# Patient Record
Sex: MALE | Race: OTHER | Employment: FULL TIME | URBAN - NONMETROPOLITAN AREA
[De-identification: names, ages, dates, MRNs, and addresses within clinical notes are randomized per-mention and may not be internally consistent; named-entity substitution may affect disease eponyms.]

---

## 2017-04-15 ENCOUNTER — HOSPITAL ENCOUNTER (EMERGENCY)
Age: 42
Discharge: HOME | End: 2017-04-15
Attending: EMERGENCY MEDICINE

## 2017-04-15 ENCOUNTER — APPOINTMENT (OUTPATIENT)
Dept: CT IMAGING | Age: 42
End: 2017-04-15

## 2017-04-15 VITALS
OXYGEN SATURATION: 97 % | SYSTOLIC BLOOD PRESSURE: 135 MMHG | HEART RATE: 54 BPM | TEMPERATURE: 97.8 F | RESPIRATION RATE: 20 BRPM | DIASTOLIC BLOOD PRESSURE: 77 MMHG

## 2017-04-15 DIAGNOSIS — N20.1 URETEROLITHIASIS: Primary | ICD-10-CM

## 2017-04-15 LAB
ABSOLUTE BANDS #: 0.5 K/UL (ref 0–1)
ABSOLUTE EOS #: 0.25 K/UL (ref 0–0.4)
ABSOLUTE LYMPH #: 2.27 K/UL (ref 0.9–2.5)
ABSOLUTE MONO #: 1.51 K/UL (ref 0–1)
ANION GAP SERPL CALCULATED.3IONS-SCNC: 14 MMOL/L (ref 9–17)
ATYPICAL LYMPHOCYTE ABSOLUTE COUNT: 1.76 K/UL (ref 0–1)
ATYPICAL LYMPHOCYTES: 14 % (ref 0–10)
BANDS: 4 % (ref 0–10)
BASOPHILS # BLD: ABNORMAL % (ref 0–2)
BASOPHILS ABSOLUTE: ABNORMAL K/UL (ref 0–0.2)
BILIRUBIN URINE: NEGATIVE
BUN BLDV-MCNC: 17 MG/DL (ref 6–20)
BUN/CREAT BLD: 18 (ref 9–20)
CALCIUM SERPL-MCNC: 8.8 MG/DL (ref 8.6–10.4)
CHLORIDE BLD-SCNC: 102 MMOL/L (ref 98–107)
CO2: 25 MMOL/L (ref 20–31)
COLOR: YELLOW
COMMENT UA: ABNORMAL
CREAT SERPL-MCNC: 0.94 MG/DL (ref 0.7–1.2)
DIFFERENTIAL TYPE: ABNORMAL
EOSINOPHILS RELATIVE PERCENT: 2 % (ref 0–5)
GFR AFRICAN AMERICAN: >60 ML/MIN
GFR NON-AFRICAN AMERICAN: >60 ML/MIN
GFR SERPL CREATININE-BSD FRML MDRD: ABNORMAL ML/MIN/{1.73_M2}
GFR SERPL CREATININE-BSD FRML MDRD: ABNORMAL ML/MIN/{1.73_M2}
GLUCOSE BLD-MCNC: 149 MG/DL (ref 70–99)
GLUCOSE URINE: NEGATIVE
HCT VFR BLD CALC: 43 % (ref 41–53)
HEMOGLOBIN: 14.3 G/DL (ref 13.5–17.5)
KETONES, URINE: NEGATIVE
LEUKOCYTE ESTERASE, URINE: NEGATIVE
LYMPHOCYTES # BLD: 18 % (ref 13–44)
MCH RBC QN AUTO: 28.9 PG (ref 26–34)
MCHC RBC AUTO-ENTMCNC: 33.4 G/DL (ref 31–37)
MCV RBC AUTO: 86.6 FL (ref 80–100)
MONOCYTES # BLD: 12 % (ref 5–9)
MORPHOLOGY: ABNORMAL
NITRITE, URINE: NEGATIVE
PDW BLD-RTO: 14.6 % (ref 12.1–15.2)
PH UA: 6 (ref 5–8)
PLATELET # BLD: 210 K/UL (ref 140–450)
PLATELET ESTIMATE: ABNORMAL
PMV BLD AUTO: ABNORMAL FL (ref 6–12)
POTASSIUM SERPL-SCNC: 3.9 MMOL/L (ref 3.7–5.3)
PROTEIN UA: ABNORMAL
RBC # BLD: 4.96 M/UL (ref 4.5–5.9)
RBC # BLD: ABNORMAL 10*6/UL
SEG NEUTROPHILS: 50 % (ref 39–75)
SEGMENTED NEUTROPHILS ABSOLUTE COUNT: 6.31 K/UL (ref 2.1–6.5)
SODIUM BLD-SCNC: 141 MMOL/L (ref 135–144)
SPECIFIC GRAVITY UA: 1.02 (ref 1–1.03)
TURBIDITY: CLEAR
URINE HGB: NEGATIVE
UROBILINOGEN, URINE: NORMAL
WBC # BLD: 12.6 K/UL (ref 3.5–11)
WBC # BLD: ABNORMAL 10*3/UL

## 2017-04-15 PROCEDURE — 81003 URINALYSIS AUTO W/O SCOPE: CPT

## 2017-04-15 PROCEDURE — 6360000002 HC RX W HCPCS: Performed by: EMERGENCY MEDICINE

## 2017-04-15 PROCEDURE — 85025 COMPLETE CBC W/AUTO DIFF WBC: CPT

## 2017-04-15 PROCEDURE — 80048 BASIC METABOLIC PNL TOTAL CA: CPT

## 2017-04-15 PROCEDURE — 99284 EMERGENCY DEPT VISIT MOD MDM: CPT

## 2017-04-15 PROCEDURE — 96374 THER/PROPH/DIAG INJ IV PUSH: CPT

## 2017-04-15 PROCEDURE — 96375 TX/PRO/DX INJ NEW DRUG ADDON: CPT

## 2017-04-15 PROCEDURE — 74176 CT ABD & PELVIS W/O CONTRAST: CPT

## 2017-04-15 RX ORDER — ONDANSETRON 2 MG/ML
4 INJECTION INTRAMUSCULAR; INTRAVENOUS ONCE
Status: COMPLETED | OUTPATIENT
Start: 2017-04-15 | End: 2017-04-15

## 2017-04-15 RX ORDER — MORPHINE SULFATE 2 MG/ML
2 INJECTION, SOLUTION INTRAMUSCULAR; INTRAVENOUS ONCE
Status: DISCONTINUED | OUTPATIENT
Start: 2017-04-15 | End: 2017-04-15

## 2017-04-15 RX ORDER — KETOROLAC TROMETHAMINE 30 MG/ML
30 INJECTION, SOLUTION INTRAMUSCULAR; INTRAVENOUS ONCE
Status: COMPLETED | OUTPATIENT
Start: 2017-04-15 | End: 2017-04-15

## 2017-04-15 RX ORDER — OXYCODONE HYDROCHLORIDE AND ACETAMINOPHEN 5; 325 MG/1; MG/1
1 TABLET ORAL EVERY 6 HOURS PRN
Qty: 8 TABLET | Refills: 0 | Status: SHIPPED | OUTPATIENT
Start: 2017-04-15 | End: 2017-04-17

## 2017-04-15 RX ADMIN — HYDROMORPHONE HYDROCHLORIDE 1 MG: 1 INJECTION, SOLUTION INTRAMUSCULAR; INTRAVENOUS; SUBCUTANEOUS at 19:00

## 2017-04-15 RX ADMIN — KETOROLAC TROMETHAMINE 30 MG: 30 INJECTION, SOLUTION INTRAMUSCULAR at 17:57

## 2017-04-15 RX ADMIN — ONDANSETRON 4 MG: 2 INJECTION INTRAMUSCULAR; INTRAVENOUS at 19:00

## 2017-04-15 ASSESSMENT — PAIN SCALES - GENERAL
PAINLEVEL_OUTOF10: 9
PAINLEVEL_OUTOF10: 2
PAINLEVEL_OUTOF10: 7
PAINLEVEL_OUTOF10: 9

## 2017-04-15 ASSESSMENT — ENCOUNTER SYMPTOMS
HEMATOCHEZIA: 0
BELCHING: 0
FLATUS: 0
SHORTNESS OF BREATH: 0
CONSTIPATION: 1
RECTAL PAIN: 0
EYES NEGATIVE: 1
ABDOMINAL PAIN: 1
SORE THROAT: 0
NAUSEA: 0
ANAL BLEEDING: 0
ALLERGIC/IMMUNOLOGIC NEGATIVE: 1
ABDOMINAL DISTENTION: 0
HEMATEMESIS: 0
COUGH: 0
VOMITING: 0
DIARRHEA: 0
RESPIRATORY NEGATIVE: 1
BLOOD IN STOOL: 0

## 2017-04-15 ASSESSMENT — PAIN DESCRIPTION - LOCATION
LOCATION: ABDOMEN

## 2017-04-15 ASSESSMENT — PAIN - FUNCTIONAL ASSESSMENT: PAIN_FUNCTIONAL_ASSESSMENT: 0-10

## 2017-04-15 ASSESSMENT — PAIN DESCRIPTION - PAIN TYPE
TYPE: ACUTE PAIN

## 2017-04-15 ASSESSMENT — PAIN DESCRIPTION - ORIENTATION
ORIENTATION: LEFT
ORIENTATION: LEFT;LOWER

## 2017-04-15 ASSESSMENT — PAIN DESCRIPTION - PROGRESSION: CLINICAL_PROGRESSION: RAPIDLY IMPROVING

## 2019-08-04 ENCOUNTER — HOSPITAL ENCOUNTER (EMERGENCY)
Age: 44
Discharge: HOME | End: 2019-08-04
Attending: FAMILY MEDICINE
Payer: COMMERCIAL

## 2019-08-04 VITALS
WEIGHT: 160 LBS | DIASTOLIC BLOOD PRESSURE: 71 MMHG | TEMPERATURE: 98.3 F | RESPIRATION RATE: 18 BRPM | SYSTOLIC BLOOD PRESSURE: 122 MMHG | OXYGEN SATURATION: 95 % | HEART RATE: 75 BPM | BODY MASS INDEX: 26.63 KG/M2

## 2019-08-04 DIAGNOSIS — S39.012A STRAIN OF LUMBAR REGION, INITIAL ENCOUNTER: Primary | ICD-10-CM

## 2019-08-04 PROCEDURE — 99282 EMERGENCY DEPT VISIT SF MDM: CPT

## 2019-08-04 RX ORDER — CYCLOBENZAPRINE HCL 10 MG
10 TABLET ORAL 3 TIMES DAILY PRN
Qty: 21 TABLET | Refills: 0 | Status: SHIPPED | OUTPATIENT
Start: 2019-08-04 | End: 2019-08-14

## 2019-08-04 RX ORDER — PREDNISONE 20 MG/1
40 TABLET ORAL DAILY
Qty: 10 TABLET | Refills: 0 | Status: SHIPPED | OUTPATIENT
Start: 2019-08-04 | End: 2019-08-09

## 2019-08-04 ASSESSMENT — ENCOUNTER SYMPTOMS
SHORTNESS OF BREATH: 0
ABDOMINAL DISTENTION: 0
ABDOMINAL SWELLING: 0
RHINORRHEA: 0
BACK PAIN: 1
ABDOMINAL PAIN: 0
DIARRHEA: 0
COUGH: 0

## 2019-08-04 ASSESSMENT — PAIN SCALES - GENERAL: PAINLEVEL_OUTOF10: 8

## 2021-06-22 ENCOUNTER — HOSPITAL ENCOUNTER (EMERGENCY)
Age: 46
Discharge: HOME | End: 2021-06-22
Attending: INTERNAL MEDICINE

## 2021-06-22 VITALS
SYSTOLIC BLOOD PRESSURE: 124 MMHG | OXYGEN SATURATION: 96 % | RESPIRATION RATE: 18 BRPM | WEIGHT: 159 LBS | TEMPERATURE: 98.9 F | DIASTOLIC BLOOD PRESSURE: 74 MMHG | BODY MASS INDEX: 26.46 KG/M2 | HEART RATE: 78 BPM

## 2021-06-22 DIAGNOSIS — H11.32 NON-TRAUMATIC SUBCONJUNCTIVAL HEMORRHAGE, LEFT: ICD-10-CM

## 2021-06-22 DIAGNOSIS — S16.1XXA STRAIN OF NECK MUSCLE, INITIAL ENCOUNTER: Primary | ICD-10-CM

## 2021-06-22 PROCEDURE — 99283 EMERGENCY DEPT VISIT LOW MDM: CPT

## 2021-06-22 RX ORDER — IBUPROFEN 600 MG/1
600 TABLET ORAL 4 TIMES DAILY PRN
Qty: 40 TABLET | Refills: 0 | Status: SHIPPED | OUTPATIENT
Start: 2021-06-22 | End: 2022-09-29

## 2021-06-22 ASSESSMENT — PAIN DESCRIPTION - FREQUENCY: FREQUENCY: CONTINUOUS

## 2021-06-22 ASSESSMENT — PAIN DESCRIPTION - PROGRESSION: CLINICAL_PROGRESSION: NOT CHANGED

## 2021-06-22 ASSESSMENT — PAIN DESCRIPTION - DESCRIPTORS: DESCRIPTORS: ACHING;SHARP

## 2021-06-22 ASSESSMENT — PAIN SCALES - GENERAL: PAINLEVEL_OUTOF10: 8

## 2021-06-22 ASSESSMENT — PAIN DESCRIPTION - ORIENTATION: ORIENTATION: LEFT

## 2021-06-22 ASSESSMENT — PAIN DESCRIPTION - PAIN TYPE: TYPE: ACUTE PAIN

## 2021-06-22 ASSESSMENT — PAIN DESCRIPTION - LOCATION: LOCATION: NECK

## 2021-06-22 ASSESSMENT — PAIN DESCRIPTION - ONSET: ONSET: ON-GOING

## 2021-06-22 NOTE — ED PROVIDER NOTES
[facility] ED  EMERGENCY DEPARTMENT ENCOUNTER      Pt Name: Parul Palumbo  MRN: [id number]  [place] 1975  Date of evaluation: 6/22/2021  Provider: Nora Curran MD    [address] [place] [address]       Chief Complaint   Patient presents with    Neck Pain     right sided neck pain that started today. Anderson lifting or straining it. HISTORY OF PRESENT ILLNESS   (Location/Symptom, Timing/Onset, Context/Setting, Quality, Duration, Modifying Factors, Severity)  Note limiting factors. Parul Palumbo is a 51 y.o. male who has a history of diabetes, presents to the emergency department with his daughter for evaluation and management of left-sided-sided nontraumatic neck pain that started today. He denies any left arm weakness or focal neuro deficits. He is also complaining of left eye redness that developed today as well without any sensation of foreign body, visual loss, pain, drainage, pruritus. 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 He has not tried thing for his symptoms. He has not been seen by any other providers for it. This patient is being evaluated during the COVID-19 pandemic. HPI    Nursing Notes were reviewed. REVIEW OF SYSTEMS    (2-9 systems for level 4, 10 or more for level 5)       REVIEW OF SYSTEMS    Constitutional: Negative for fatigue and fever. HENT: negative for dental problem, ear pain, rhinorrhea and sore throat. Eyes: Positive for left eye redness, negative for pain and itching and drainage. Respiratory: Negative for cough, chest tightness and shortness of breath. Cardiovascular: Negative for chest pain, palpitations and leg swelling. Gastrointestinal: Negative for abdominal distention, abdominal pain, diarrhea, nausea and vomiting. Musculoskeletal: Positive for left neck pain, negative for arthralgias, back pain and neck pain.    Skin: Negative for wound, laceration, color change, pallor, rash Allergic/Immunologic: Negative for environmental allergies and food allergies. Neurological: Negative for head injury, loss of consciousness, dizziness, speech difficulty, weakness, distal tingling/numbness and headaches. Hematological: Negative for adenopathy. Does not bruise/bleed easily. Except as noted above the remainder of the review of systems was reviewed and negative. PASTMEDICAL HISTORY     Past Medical History:   Diagnosis Date    Diabetes [facility])          SURGICAL HISTORY     History reviewed. No pertinent surgical history. CURRENT MEDICATIONS       Previous Medications    No medications on file       ALLERGIES     Patient has no known allergies. FAMILY HISTORY     History reviewed. No pertinent family history. SOCIAL HISTORY       Social History     Socioeconomic History    Marital status:      Spouse name: None    Number of children: None    Years of education: None    Highest education level: None   Occupational History    None   Tobacco Use    Smoking status: Never Smoker    Smokeless tobacco: Never Used   Substance and Sexual Activity    Alcohol use: Never    Drug use: None    Sexual activity: None   Other Topics Concern    None   Social History Narrative    None     Social Determinants of Health     Financial Resource Strain:     Difficulty of Paying Living Expenses:    Food Insecurity:     Worried About Running Out of Food in the Last Year:     Ran Out of Food in the Last Year:    Transportation Needs:     Lack of Transportation (Medical):      Lack of Transportation (Non-Medical):    Physical Activity:     Days of Exercise per Week:     Minutes of Exercise per Session:    Stress:     Feeling of Stress :    Social Connections:     Frequency of Communication with Friends and Family:     Frequency of Social Gatherings with Friends and Family:     Attends Orthodox Services:     Active Member of Clubs or Organizations:     Attends Club or and breath sounds are symmetric and normal. No respiratory distress. There are no wheezes, rales or rhonchi. No tenderness is exhibited upon palpation of the chest wall. Abdominal: Soft. Bowel sounds are normal. No distension or no mass exhibitted. No organomegaly. There is no tenderness, rebound, rigidity or guarding. Genitourinary:   No CVA tenderness noted on examination. Musculoskeletal: Examination of the back demonstrates no focal areas of tenderness. No spinous tenderness or step-offs identified on palpation. No paraspinous tenderness. Normal range of motion of all extremities and torso. No edema, tenderness or deformity. .  Lymphadenopathy:  No cervical adenopathy. Neurological:   Alert and oriented to person, place, and time. Reflexes are normal.  There are no cranial nerve deficits. Normal muscle tone, motor and sensory function exhibitted. Strength 5/5 in all extremities and torso. Coordination and gait normal.   Skin: Skin is warm and dry. No rash noted. No diaphoresis. No erythema. No pallor. Psychiatric:  normal mood and affect. Behavior is  normal. Judgment and thought content normal.     DIAGNOSTIC RESULTS     EKG: All EKG's are interpreted by the Emergency Department Physician who either signs or Co-signs this chart in the absence of a cardiologist.    Not indicated. RADIOLOGY:   Non-plain film images such as CT, Ultrasoundand MRI are read by the radiologist. Plain radiographic images are visualized and preliminarily interpreted by the emergency physician with the below findings:    Not indicated. Interpretation per the Radiologist below, if available at the time of this note:    No orders to display         ED BEDSIDE ULTRASOUND:   Performed by ED Physician - none    LABS:  Labs Reviewed - No data to display    All other labs were within normal range or not returned as of this dictation.     EMERGENCY DEPARTMENT COURSE and DIFFERENTIAL DIAGNOSIS/MDM:   Vitals:    Vitals: worsen    [facility] ED  [address] [place] [address]  [phone]  Go to   As needed, If symptoms worsen    Tj Leal MD  [address] [place] [address] Dr Madai [facility]  [phone]    Schedule an appointment as soon as possible for a visit in 1 week  As needed, If symptoms worsen        Final Impression:   1. Strain of neck muscle, initial encounter    2. Non-traumatic subconjunctival hemorrhage, left               (Please note that portions of this note werecompleted with a voice recognition program.  Efforts were made to edit the dictations but occasionally words are mis-transcribed.)    FINAL IMPRESSION      1. Strain of neck muscle, initial encounter    2.  Non-traumatic subconjunctival hemorrhage, left          DISPOSITION/PLAN   DISPOSITION Decision To Discharge 06/22/2021 05:29:21 PM      PATIENT REFERRED TO:  [facility] ED  [address] [place] [address]  [phone]  Go to   As needed, If symptoms worsen    [facility] ED  [address] [place] [address]  [phone]  Go to   As needed, If symptoms worsen    Tj Leal MD  [address] [facility]  [phone]    Schedule an appointment as soon as possible for a visit in 1 week  As needed, If symptoms worsen      DISCHARGE MEDICATIONS:  New Prescriptions    IBUPROFEN (ADVIL;MOTRIN) 600 MG TABLET    Take 1 tablet by mouth 4 times daily as needed for Pain          (Please note that portions of this note were completed with a voice recognition program.  Efforts were made to edit the dictations but occasionally words are mis-transcribed.)    Francisco Valadez MD (electronically signed)  Attending Emergency Physician          Francisco Valadez MD  06/22/21 1065

## 2022-09-29 ENCOUNTER — HOSPITAL ENCOUNTER (EMERGENCY)
Age: 47
Discharge: HOME | End: 2022-09-29
Attending: EMERGENCY MEDICINE

## 2022-09-29 ENCOUNTER — APPOINTMENT (OUTPATIENT)
Dept: CT IMAGING | Age: 47
End: 2022-09-29

## 2022-09-29 VITALS
OXYGEN SATURATION: 98 % | WEIGHT: 160 LBS | DIASTOLIC BLOOD PRESSURE: 69 MMHG | TEMPERATURE: 98.8 F | HEIGHT: 65 IN | SYSTOLIC BLOOD PRESSURE: 115 MMHG | RESPIRATION RATE: 18 BRPM | HEART RATE: 64 BPM | BODY MASS INDEX: 26.66 KG/M2

## 2022-09-29 DIAGNOSIS — G44.1 OTHER VASCULAR HEADACHE: Primary | ICD-10-CM

## 2022-09-29 DIAGNOSIS — Q28.2 AVM (ARTERIOVENOUS MALFORMATION) BRAIN: ICD-10-CM

## 2022-09-29 LAB
ABSOLUTE EOS #: 0.1 K/UL (ref 0–0.4)
ABSOLUTE LYMPH #: 2.4 K/UL (ref 1–4.8)
ABSOLUTE MONO #: 0.7 K/UL (ref 0–1)
ANION GAP SERPL CALCULATED.3IONS-SCNC: 9 MMOL/L (ref 9–17)
BASOPHILS # BLD: 0 % (ref 0–2)
BASOPHILS ABSOLUTE: 0 K/UL (ref 0–0.2)
BUN BLDV-MCNC: 15 MG/DL (ref 6–20)
BUN/CREAT BLD: 15 (ref 9–20)
CALCIUM SERPL-MCNC: 9.1 MG/DL (ref 8.6–10.4)
CHLORIDE BLD-SCNC: 102 MMOL/L (ref 98–107)
CO2: 27 MMOL/L (ref 20–31)
CREAT SERPL-MCNC: 1.01 MG/DL (ref 0.7–1.2)
DIFFERENTIAL TYPE: YES
EOSINOPHILS RELATIVE PERCENT: 2 % (ref 0–5)
GFR AFRICAN AMERICAN: >60 ML/MIN
GFR NON-AFRICAN AMERICAN: >60 ML/MIN
GFR SERPL CREATININE-BSD FRML MDRD: ABNORMAL ML/MIN/{1.73_M2}
GLUCOSE BLD-MCNC: 129 MG/DL (ref 70–99)
HCT VFR BLD CALC: 40.6 % (ref 41–53)
HEMOGLOBIN: 14 G/DL (ref 13.5–17.5)
LYMPHOCYTES # BLD: 33 % (ref 13–44)
MCH RBC QN AUTO: 29.4 PG (ref 26–34)
MCHC RBC AUTO-ENTMCNC: 34.5 G/DL (ref 31–37)
MCV RBC AUTO: 85.2 FL (ref 80–100)
MONOCYTES # BLD: 10 % (ref 5–9)
PDW BLD-RTO: 14.1 % (ref 12.1–15.2)
PLATELET # BLD: 201 K/UL (ref 140–450)
POTASSIUM SERPL-SCNC: 3.9 MMOL/L (ref 3.7–5.3)
RBC # BLD: 4.77 M/UL (ref 4.5–5.9)
SEG NEUTROPHILS: 55 % (ref 39–75)
SEGMENTED NEUTROPHILS ABSOLUTE COUNT: 4 K/UL (ref 2.1–6.5)
SODIUM BLD-SCNC: 138 MMOL/L (ref 135–144)
WBC # BLD: 7.2 K/UL (ref 3.5–11)

## 2022-09-29 PROCEDURE — 99285 EMERGENCY DEPT VISIT HI MDM: CPT

## 2022-09-29 PROCEDURE — 70450 CT HEAD/BRAIN W/O DYE: CPT

## 2022-09-29 PROCEDURE — 36415 COLL VENOUS BLD VENIPUNCTURE: CPT

## 2022-09-29 PROCEDURE — 70496 CT ANGIOGRAPHY HEAD: CPT

## 2022-09-29 PROCEDURE — 80048 BASIC METABOLIC PNL TOTAL CA: CPT

## 2022-09-29 PROCEDURE — 85025 COMPLETE CBC W/AUTO DIFF WBC: CPT

## 2022-09-29 PROCEDURE — 6370000000 HC RX 637 (ALT 250 FOR IP): Performed by: EMERGENCY MEDICINE

## 2022-09-29 PROCEDURE — 6360000004 HC RX CONTRAST MEDICATION: Performed by: EMERGENCY MEDICINE

## 2022-09-29 RX ORDER — IBUPROFEN 200 MG
600 TABLET ORAL ONCE
Status: COMPLETED | OUTPATIENT
Start: 2022-09-29 | End: 2022-09-29

## 2022-09-29 RX ORDER — IBUPROFEN 600 MG/1
600 TABLET ORAL EVERY 6 HOURS PRN
Qty: 20 TABLET | Refills: 0 | Status: SHIPPED | OUTPATIENT
Start: 2022-09-29

## 2022-09-29 RX ADMIN — IOPAMIDOL 100 ML: 755 INJECTION, SOLUTION INTRAVENOUS at 18:38

## 2022-09-29 RX ADMIN — IBUPROFEN 600 MG: 200 TABLET, FILM COATED ORAL at 17:17

## 2022-09-29 ASSESSMENT — ENCOUNTER SYMPTOMS
DIARRHEA: 0
EYE REDNESS: 0
COUGH: 0
COLOR CHANGE: 0
SHORTNESS OF BREATH: 0
SORE THROAT: 0
NAUSEA: 0
ABDOMINAL PAIN: 0
VOMITING: 0
BACK PAIN: 0
TROUBLE SWALLOWING: 0
EYE PAIN: 0

## 2022-09-29 ASSESSMENT — PAIN DESCRIPTION - ORIENTATION
ORIENTATION: LEFT;POSTERIOR
ORIENTATION: LEFT

## 2022-09-29 ASSESSMENT — PAIN SCALES - GENERAL
PAINLEVEL_OUTOF10: 7
PAINLEVEL_OUTOF10: 8
PAINLEVEL_OUTOF10: 8

## 2022-09-29 ASSESSMENT — PAIN DESCRIPTION - DESCRIPTORS
DESCRIPTORS: THROBBING
DESCRIPTORS: THROBBING

## 2022-09-29 ASSESSMENT — PAIN DESCRIPTION - FREQUENCY: FREQUENCY: INTERMITTENT

## 2022-09-29 ASSESSMENT — PAIN - FUNCTIONAL ASSESSMENT: PAIN_FUNCTIONAL_ASSESSMENT: 0-10

## 2022-09-29 ASSESSMENT — PAIN DESCRIPTION - LOCATION
LOCATION: HEAD;NECK
LOCATION: HEAD;NECK

## 2022-09-29 ASSESSMENT — PAIN DESCRIPTION - PAIN TYPE: TYPE: ACUTE PAIN

## 2022-09-29 NOTE — ED PROVIDER NOTES
reviewed. No pertinent family history. SOCIAL HISTORY       Social History     Socioeconomic History    Marital status:      Spouse name: None    Number of children: None    Years of education: None    Highest education level: None   Tobacco Use    Smoking status: Never    Smokeless tobacco: Never   Substance and Sexual Activity    Alcohol use: Never    Drug use: Never           PHYSICAL EXAM    (up to 7 for level 4, 8 ormore for level 5)     ED Triage Vitals [09/29/22 1639]   BP Temp Temp Source Heart Rate Resp SpO2 Height Weight   115/69 98.8 °F (37.1 °C) Oral 64 18 98 % 5' 5\" (1.651 m) 160 lb (72.6 kg)       Physical Exam    Physical    Vital signs and nursing notes were reviewed as well as the social, family, and past medical history. Gen. appearance: Patient is alert and oriented and in no acute distress    Head: Atraumatic, normocephalic    Neck: Supple, trachea/thyroid normal    EENT: PERRLA, EOMI, conjunctiva normal.    Skin: Warm and dry with no rash    Cardiovascular: Heart RRR, no gallops or rubs, no aortic enlargement or bruits noted. Respiratory: Lungs clear, no wheezing, no rales, normal breath sounds. Gastrointestinal: Abdomen nontender, bowel sounds normal, no rebound/guarding/distention or mass    Musculoskeletal: No tenderness in the extremities, no back or hip pain. Neurological: Patient is alert and oriented ×3, no focal motor or sensory deficits noted, reflexes normal, NIH = 0      DIAGNOSTIC RESULTS             LABS:  Labs Reviewed   CBC WITH AUTO DIFFERENTIAL - Abnormal; Notable for the following components:       Result Value    Hematocrit 40.6 (*)     Monocytes 10 (*)     All other components within normal limits   BASIC METABOLIC PANEL - Abnormal; Notable for the following components:    Glucose 129 (*)     All other components within normal limits       All other labs were within normal range or not returned as of this dictation.     EMERGENCY DEPARTMENT COURSE and DIFFERENTIAL DIAGNOSIS/MDM:   Vitals:    Vitals:    09/29/22 1639   BP: 115/69   Pulse: 64   Resp: 18   Temp: 98.8 °F (37.1 °C)   TempSrc: Oral   SpO2: 98%   Weight: 160 lb (72.6 kg)   Height: 5' 5\" (1.651 m)                 REASSESSMENT      Here in the ED patient had a CT scan of the head followed by CTA of the head and neck which showed an AVM on the left side. I discussed this finding with the family who states that patient was told he had this 15 years ago and that there was nothing to be done. They states they are aware of it. I discussed with them that given that his headaches were worsening and now he has been having a headache for a few weeks he should have this evaluated by neurosurgery. Patient and family do not want to be admitted or transferred for this urgently as its been going on for so long but are interested in following up as an outpatient with neurosurgery and we will provide them a consultation. I did advise them if they want any of the treatments they are refusing they can return at any time or present to [facility] in [place]. A  was used to make sure they understood all discussions and the potential significance of this finding. Patient will be discharged home. Patient was referred to Dr. Andre Jara who his the neurosurgeon on call in [place]. PROCEDURES:  Unless otherwise noted below, none     Procedures    FINAL IMPRESSION      1. Other vascular headache    2. AVM (arteriovenous malformation) brain          DISPOSITION/PLAN   DISPOSITION Decision To Discharge 09/29/2022 08:15:27 PM      PATIENT REFERRED TO:  No follow-up provider specified.     DISCHARGE MEDICATIONS:  New Prescriptions    No medications on file          (Please note that portions ofthis note were completed with a voice recognition program.  Efforts were made to edit the dictations but occasionally words are mis-transcribed.)    Danny Gaines MD(electronically signed)  Attending Emergency Physician Gilberto Oppenheim, MD  09/29/22 2021

## 2022-09-29 NOTE — LETTER
[facility] ED  [address] [facility] [address]  Phone: [phone]               September 29, 2022    Patient: Victoria Nelson   YOB: 1975   Date of Visit: 9/29/2022       To Whom It May Concern:    Estrella Lundberg was seen and treated in our emergency department on 9/29/2022. He may return to work on 9/30/22.       Sincerely,       Deidra Gilford, RN         Signature:__________________________________

## 2022-09-29 NOTE — LETTER
[facility] ED  [address]  Phone: [phone]               September 30, 2022    Patient: Rony Salinas   YOB: 1975   Date of Visit: 9/29/2022       To Whom It May Concern:    Kandis Garrido was seen and treated in our emergency department on 9/29/2022. He may return to work October 3, 2022.       Sincerely,       Diana Renteria RN         Signature:__________________________________

## 2022-10-05 ENCOUNTER — OFFICE VISIT (OUTPATIENT)
Dept: NEUROSURGERY | Age: 47
End: 2022-10-05
Payer: COMMERCIAL

## 2022-10-05 VITALS
TEMPERATURE: 98.1 F | BODY MASS INDEX: 25.66 KG/M2 | SYSTOLIC BLOOD PRESSURE: 115 MMHG | HEART RATE: 61 BPM | HEIGHT: 65 IN | OXYGEN SATURATION: 99 % | DIASTOLIC BLOOD PRESSURE: 70 MMHG | WEIGHT: 154 LBS

## 2022-10-05 DIAGNOSIS — Q28.2 AVM (ARTERIOVENOUS MALFORMATION) BRAIN: Primary | ICD-10-CM

## 2022-10-05 DIAGNOSIS — G43.109 MIGRAINE WITH AURA AND WITHOUT STATUS MIGRAINOSUS, NOT INTRACTABLE: ICD-10-CM

## 2022-10-05 PROCEDURE — 99204 OFFICE O/P NEW MOD 45 MIN: CPT | Performed by: NEUROLOGICAL SURGERY

## 2022-10-05 NOTE — PROGRESS NOTES
Department of Neurosurgery                                                 New patient clinic note      Reason for Consult:  AVM  Neurosurgeon: Luann Connors DO      History Obtained From:  patient, spouse    CHIEF COMPLAINT:         Chief Complaint   Patient presents with    Other       HISTORY OF PRESENT ILLNESS:       The patient is a 49 y.o. male who presents with for consulation for headache and AVM  Diagnosed with AVM at least 15 years ago when he presented with headaches at the top of the head. Recently on September 29th,  he developed left posterior headache that brought him to the ED that lasted 2 weeks. Does complains of right side of visual field become blurry and pulsate that last 1 hour that average once a month. After this is over he then has a headache the In posterior aspect. He then goes to sleep and this goes away after wakes up. This is provoked with hunger. PAST MEDICAL HISTORY :       Past Medical History:        Diagnosis Date    Diabetes [facility])        Past Surgical History:    No past surgical history on file.     Social History:   Social History     Socioeconomic History    Marital status:      Spouse name: Not on file    Number of children: Not on file    Years of education: Not on file    Highest education level: Not on file   Occupational History    Not on file   Tobacco Use    Smoking status: Never    Smokeless tobacco: Never   Substance and Sexual Activity    Alcohol use: Never    Drug use: Never    Sexual activity: Not on file   Other Topics Concern    Not on file   Social History Narrative    Not on file     Social Determinants of Health     Financial Resource Strain: Not on file   Food Insecurity: Not on file   Transportation Needs: Not on file   Physical Activity: Not on file   Stress: Not on file   Social Connections: Not on file   Intimate Partner Violence: Not on file   Housing Stability: Not on file       Family History:   No family history on file.    Allergies:  Patient has no known allergies. Home Medications:  Prior to Admission medications    Medication Sig Start Date End Date Taking? Authorizing Provider   ibuprofen (ADVIL;MOTRIN) 600 MG tablet Take 1 tablet by mouth every 6 hours as needed for Pain 9/29/22  Yes Nunu Bella MD       Current Medications:   No current facility-administered medications for this visit. REVIEW OF SYSTEMS:       CONSTITUTIONAL: negative for fatigue and malaise   EYES: negative for double vision and photophobia    HEENT: negative for tinnitus and sore throat   RESPIRATORY: negative for cough, shortness of breath   CARDIOVASCULAR: negative for chest pain, palpitations   GASTROINTESTINAL: negative for nausea, vomiting   GENITOURINARY: negative for incontinence   MUSCULOSKELETAL: negative for neck or back pain   NEUROLOGICAL: negative for seizures   PSYCHIATRIC: negative for agitated     Review of systems otherwise negative.     PHYSICAL EXAM:       /70 (Site: Right Upper Arm, Position: Sitting, Cuff Size: Large Adult)   Pulse 61   Temp 98.1 °F (36.7 °C) (Temporal)   Ht 5' 5\" (1.651 m)   Wt 154 lb (69.9 kg)   SpO2 99%   BMI 25.63 kg/m²     Gen: NAD, comfortable  HEENT: moist mucus membranes  Cardio: RRR  Pulm: chest rise symmetrically  GI: abd soft  Ext: no edema  Skin: warm    Neuro:    AOX3  CN 2-12 grossly intact  Speech articulate  Motor 5/5  Sensation symmetrical   No buckley or clonus  Left posterior occipital scalp pulsatile    LABS AND IMAGING:     CBC with Differential:    Lab Results   Component Value Date/Time    WBC 7.2 09/29/2022 05:56 PM    RBC 4.77 09/29/2022 05:56 PM    HGB 14.0 09/29/2022 05:56 PM    HCT 40.6 09/29/2022 05:56 PM     09/29/2022 05:56 PM    MCV 85.2 09/29/2022 05:56 PM    MCH 29.4 09/29/2022 05:56 PM    MCHC 34.5 09/29/2022 05:56 PM    RDW 14.1 09/29/2022 05:56 PM    LYMPHOPCT 33 09/29/2022 05:56 PM    MONOPCT 10 09/29/2022 05:56 PM    BASOPCT 0 09/29/2022 05:56 PM MONOSABS 0.70 09/29/2022 05:56 PM    LYMPHSABS 2.40 09/29/2022 05:56 PM    EOSABS 0.10 09/29/2022 05:56 PM    BASOSABS 0.00 09/29/2022 05:56 PM    DIFFTYPE YES 09/29/2022 05:56 PM     BMP:    Lab Results   Component Value Date/Time     09/29/2022 05:56 PM    K 3.9 09/29/2022 05:56 PM     09/29/2022 05:56 PM    CO2 27 09/29/2022 05:56 PM    BUN 15 09/29/2022 05:56 PM    LABALBU 4.2 03/28/2014 08:15 AM    LABALBU 4.4 06/01/2012 08:25 AM    CREATININE 1.01 09/29/2022 05:56 PM    CALCIUM 9.1 09/29/2022 05:56 PM    GFRAA >60 09/29/2022 05:56 PM    LABGLOM >60 09/29/2022 05:56 PM    GLUCOSE 129 09/29/2022 05:56 PM    GLUCOSE 101 06/01/2012 08:25 AM       Radiology Review:  CTA :  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 It is feed mainly by the left MCA and PCA    branches. Prominent veins draining into the superior sagittal sinus and vein of    Lucien. No aneurysm. Carotids and vertebral arteries show no significant stenosis or dissection. ASSESSMENT AND PLAN:       Patient Active Problem List   Diagnosis    Left flank pain, chronic    Type 2 diabetes mellitus without complication ([facility] [address]    AVM (arteriovenous malformation) brain    Migraine with aura and without status migrainosus, not intractable       AVM (arteriovenous malformation) brain  -     [facility], Neuro-Endovascular Surgery, [place]  Migraine with aura and without status migrainosus, not intractable  -     [facility], Neurology, [employer]     A/P:  This is a 49 y.o. male with    Diagnosis Orders   1. AVM (arteriovenous malformation) brain  [place], [address], [place]      2.  Migraine with aura and without status migrainosus, not intractable  [facility], Neurology, [facility] neurology consult for migraine  Endovascular referal for DSA for AVM      I showed the patient the imagings and explained the diagnosis and the various treatment options      Angie Leal DO     10/5/2022  12:09 PM    Jing Viera DO  Staff Neurosurgeon    This note was created using voice recognition software. There may be inaccuracies of transcription  that are inadvertently overlooked prior to the signature. There is any questions about the transcription please contact me.

## 2022-11-01 ENCOUNTER — HOSPITAL ENCOUNTER (OUTPATIENT)
Dept: INTERVENTIONAL RADIOLOGY/VASCULAR | Age: 47
Discharge: HOME | End: 2022-11-03
Payer: COMMERCIAL

## 2022-11-01 VITALS
OXYGEN SATURATION: 95 % | SYSTOLIC BLOOD PRESSURE: 127 MMHG | DIASTOLIC BLOOD PRESSURE: 72 MMHG | HEART RATE: 82 BPM | RESPIRATION RATE: 18 BRPM | HEIGHT: 65 IN | BODY MASS INDEX: 25.66 KG/M2 | WEIGHT: 154 LBS | TEMPERATURE: 98.4 F

## 2022-11-01 DIAGNOSIS — Q27.30 AVM (ARTERIOVENOUS MALFORMATION): ICD-10-CM

## 2022-11-01 LAB
EGFR, POC: >60 ML/MIN/1.73M2
GLUCOSE BLD-MCNC: 113 MG/DL (ref 74–100)
POC BUN: 21 MG/DL (ref 8–26)
POC CHLORIDE: 106 MMOL/L (ref 98–107)
POC CREATININE: 0.81 MG/DL (ref 0.51–1.19)
POC HEMATOCRIT: 47 % (ref 41–53)
POC HEMOGLOBIN: 16.1 G/DL (ref 13.5–17.5)
POC IONIZED CALCIUM: 1.16 MMOL/L (ref 1.15–1.33)
POC POTASSIUM: 4 MMOL/L (ref 3.5–4.5)
POC SODIUM: 142 MMOL/L (ref 138–146)

## 2022-11-01 PROCEDURE — 82947 ASSAY GLUCOSE BLOOD QUANT: CPT

## 2022-11-01 PROCEDURE — 36226 PLACE CATH VERTEBRAL ART: CPT

## 2022-11-01 PROCEDURE — 6360000002 HC RX W HCPCS: Performed by: PSYCHIATRY & NEUROLOGY

## 2022-11-01 PROCEDURE — 2580000003 HC RX 258: Performed by: PSYCHIATRY & NEUROLOGY

## 2022-11-01 PROCEDURE — C1887 CATHETER, GUIDING: HCPCS

## 2022-11-01 PROCEDURE — 36227 PLACE CATH XTRNL CAROTID: CPT

## 2022-11-01 PROCEDURE — C1769 GUIDE WIRE: HCPCS

## 2022-11-01 PROCEDURE — 7100000001 HC PACU RECOVERY - ADDTL 15 MIN

## 2022-11-01 PROCEDURE — 6360000004 HC RX CONTRAST MEDICATION: Performed by: PSYCHIATRY & NEUROLOGY

## 2022-11-01 PROCEDURE — 84520 ASSAY OF UREA NITROGEN: CPT

## 2022-11-01 PROCEDURE — 99152 MOD SED SAME PHYS/QHP 5/>YRS: CPT | Performed by: PSYCHIATRY & NEUROLOGY

## 2022-11-01 PROCEDURE — C1760 CLOSURE DEV, VASC: HCPCS

## 2022-11-01 PROCEDURE — 36227 PLACE CATH XTRNL CAROTID: CPT | Performed by: PSYCHIATRY & NEUROLOGY

## 2022-11-01 PROCEDURE — 6360000002 HC RX W HCPCS: Performed by: STUDENT IN AN ORGANIZED HEALTH CARE EDUCATION/TRAINING PROGRAM

## 2022-11-01 PROCEDURE — 36224 PLACE CATH CAROTD ART: CPT | Performed by: PSYCHIATRY & NEUROLOGY

## 2022-11-01 PROCEDURE — 82330 ASSAY OF CALCIUM: CPT

## 2022-11-01 PROCEDURE — 84295 ASSAY OF SERUM SODIUM: CPT

## 2022-11-01 PROCEDURE — 36226 PLACE CATH VERTEBRAL ART: CPT | Performed by: PSYCHIATRY & NEUROLOGY

## 2022-11-01 PROCEDURE — C1894 INTRO/SHEATH, NON-LASER: HCPCS

## 2022-11-01 PROCEDURE — 7100000000 HC PACU RECOVERY - FIRST 15 MIN

## 2022-11-01 PROCEDURE — 2709999900 HC NON-CHARGEABLE SUPPLY

## 2022-11-01 PROCEDURE — 99214 OFFICE O/P EST MOD 30 MIN: CPT | Performed by: PSYCHIATRY & NEUROLOGY

## 2022-11-01 PROCEDURE — 99153 MOD SED SAME PHYS/QHP EA: CPT

## 2022-11-01 PROCEDURE — 82435 ASSAY OF BLOOD CHLORIDE: CPT

## 2022-11-01 PROCEDURE — 99152 MOD SED SAME PHYS/QHP 5/>YRS: CPT

## 2022-11-01 PROCEDURE — 84132 ASSAY OF SERUM POTASSIUM: CPT

## 2022-11-01 PROCEDURE — 85014 HEMATOCRIT: CPT

## 2022-11-01 PROCEDURE — 82565 ASSAY OF CREATININE: CPT

## 2022-11-01 PROCEDURE — 36224 PLACE CATH CAROTD ART: CPT

## 2022-11-01 RX ORDER — MIDAZOLAM HYDROCHLORIDE 2 MG/2ML
INJECTION, SOLUTION INTRAMUSCULAR; INTRAVENOUS
Status: COMPLETED | OUTPATIENT
Start: 2022-11-01 | End: 2022-11-01

## 2022-11-01 RX ORDER — IODIXANOL 320 MG/ML
100 INJECTION, SOLUTION INTRAVASCULAR
Status: COMPLETED | OUTPATIENT
Start: 2022-11-01 | End: 2022-11-01

## 2022-11-01 RX ORDER — SODIUM CHLORIDE 9 MG/ML
INJECTION, SOLUTION INTRAVENOUS PRN
Status: DISCONTINUED | OUTPATIENT
Start: 2022-11-01 | End: 2022-11-04 | Stop reason: HOSPADM

## 2022-11-01 RX ORDER — FENTANYL CITRATE 50 UG/ML
INJECTION, SOLUTION INTRAMUSCULAR; INTRAVENOUS
Status: COMPLETED | OUTPATIENT
Start: 2022-11-01 | End: 2022-11-01

## 2022-11-01 RX ORDER — ONDANSETRON 4 MG/1
4 TABLET, ORALLY DISINTEGRATING ORAL EVERY 8 HOURS PRN
Status: DISCONTINUED | OUTPATIENT
Start: 2022-11-01 | End: 2022-11-04 | Stop reason: HOSPADM

## 2022-11-01 RX ORDER — SODIUM CHLORIDE 9 MG/ML
INJECTION, SOLUTION INTRAVENOUS CONTINUOUS
Status: DISCONTINUED | OUTPATIENT
Start: 2022-11-01 | End: 2022-11-04 | Stop reason: HOSPADM

## 2022-11-01 RX ORDER — SODIUM CHLORIDE 0.9 % (FLUSH) 0.9 %
5-40 SYRINGE (ML) INJECTION EVERY 12 HOURS SCHEDULED
Status: DISCONTINUED | OUTPATIENT
Start: 2022-11-01 | End: 2022-11-04 | Stop reason: HOSPADM

## 2022-11-01 RX ORDER — ACETAMINOPHEN 325 MG/1
650 TABLET ORAL EVERY 4 HOURS PRN
Status: DISCONTINUED | OUTPATIENT
Start: 2022-11-01 | End: 2022-11-04 | Stop reason: HOSPADM

## 2022-11-01 RX ORDER — SODIUM CHLORIDE 0.9 % (FLUSH) 0.9 %
5-40 SYRINGE (ML) INJECTION PRN
Status: DISCONTINUED | OUTPATIENT
Start: 2022-11-01 | End: 2022-11-04 | Stop reason: HOSPADM

## 2022-11-01 RX ORDER — ONDANSETRON 2 MG/ML
4 INJECTION INTRAMUSCULAR; INTRAVENOUS EVERY 6 HOURS PRN
Status: DISCONTINUED | OUTPATIENT
Start: 2022-11-01 | End: 2022-11-04 | Stop reason: HOSPADM

## 2022-11-01 RX ORDER — HEPARIN SODIUM 1000 [USP'U]/ML
INJECTION, SOLUTION INTRAVENOUS; SUBCUTANEOUS
Status: COMPLETED | OUTPATIENT
Start: 2022-11-01 | End: 2022-11-01

## 2022-11-01 RX ADMIN — HEPARIN SODIUM 2000 UNITS: 1000 INJECTION, SOLUTION INTRAVENOUS; SUBCUTANEOUS at 11:12

## 2022-11-01 RX ADMIN — Medication 2000 MG: at 11:02

## 2022-11-01 RX ADMIN — IODIXANOL 85 ML: 320 INJECTION, SOLUTION INTRAVASCULAR at 12:41

## 2022-11-01 RX ADMIN — MIDAZOLAM HYDROCHLORIDE 1 MG: 1 INJECTION, SOLUTION INTRAMUSCULAR; INTRAVENOUS at 11:08

## 2022-11-01 RX ADMIN — FENTANYL CITRATE 25 MCG: 50 INJECTION, SOLUTION INTRAMUSCULAR; INTRAVENOUS at 12:20

## 2022-11-01 RX ADMIN — SODIUM CHLORIDE: 9 INJECTION, SOLUTION INTRAVENOUS at 09:47

## 2022-11-01 RX ADMIN — FENTANYL CITRATE 50 MCG: 50 INJECTION, SOLUTION INTRAMUSCULAR; INTRAVENOUS at 11:08

## 2022-11-01 ASSESSMENT — PAIN SCALES - GENERAL: PAINLEVEL_OUTOF10: 5

## 2022-11-01 ASSESSMENT — PAIN DESCRIPTION - LOCATION: LOCATION: HEAD

## 2022-11-01 ASSESSMENT — PAIN DESCRIPTION - DESCRIPTORS: DESCRIPTORS: ACHING

## 2022-11-01 ASSESSMENT — PAIN DESCRIPTION - PAIN TYPE: TYPE: ACUTE PAIN

## 2022-11-01 NOTE — OR NURSING
5Fr vascade successfully deployed to right femoral artery, access removed and pressure held at site.

## 2022-11-01 NOTE — H&P
for anxiety      Review of systems otherwise negative. OBJECTIVE:     Vitals:    22 0939   BP: 131/71   Pulse: 81   Resp: 25   Temp: 98.4 °F (36.9 °C)   SpO2: 96%        General:  Gen: normal habitus, NAD  HEENT: NCAT, mucosa moist  Cvs: RRR, S1 S2 normal  Resp: symmetric unlabored breathing  Abd: s/nd/nt  Ext: no edema  Skin: no lesions seen, warm and dry    Neuro:  Gen: awake and alert, oriented x4. Speak Turkish only  Lang/speech: no aphasia or dysarthria. Follows commands. CN: PERRL, EOMI, VFF, V1-3 intact, face symmetric, hearing intact, shoulder shrug symmetric, tongue midline  Motor: grossly 5/5 UE and LE b/l  Sense: LT intact in all 4 ext. Coord: FTN and HTS intact b/l  DTR: deferred  Gait: narrow base gait    NIH Stroke Scale:   1a  Level of consciousness: 0 - alert; keenly responsive   1b. LOC questions:  0 - answers both questions correctly   1c. LOC commands: 0 - performs both tasks correctly   2. Best Gaze: 0 - normal   3. Visual: 0 - no visual loss   4. Facial Palsy: 0 - normal symmetric movement   5a. Motor left arm: 0 - no drift, limb holds 90 (or 45) degrees for full 10 seconds   5b. Motor right arm: 0 - no drift, limb holds 90 (or 45) degrees for full 10 seconds   6a. Motor left le - no drift; leg holds 30 degree position for full 5 seconds   6b  Motor right le - no drift; leg holds 30 degree position for full 5 seconds   7. Limb Ataxia: 0 - absent   8. Sensory: 0 - normal; no sensory loss   9. Best Language:  0 - no aphasia, normal   10. Dysarthria: 0 - normal   11. Extinction and Inattention: 0 - no abnormality         Total:   0     MRS: 0      LABS:   Reviewed.   Lab Results   Component Value Date    HGB 14.0 2022    WBC 7.2 2022     2022     2022    BUN 15 2022    CREATININE 0.81 2022    AST 24 2014    ALT 45 (H) 2014      No results found for: COVID19    RADIOLOGY:   Images were personally reviewed including:    CTA head/neck: large nidus of AVM at the Left MCA/PCA territory      ASSESSMENT:     49year old man with chronic headache and kidney stones, was recently diagnosed with AVM at the L MCA/PCA site. PLAN:     - proceed with DSA with 6 vessels  - keppra 500mg BID for headache      Case discussed with Dr. Lenard Aponte attending.     Ruba Dennison MD  Stroke, [facility]  [address]  Electronically signed 11/1/2022 at 10:47 AM

## 2022-11-01 NOTE — BRIEF OP NOTE
Brief Postoperative Note                    [facility]    NEUROENDOVASCULAR SERVICE: POST-OP NOTE: 11/1/2022    Pt Name: Samir Whitt  MRN: [id number]  [place]: 1975  Date of Procedure: 11/1/2022  Primary Care Physician: JUWAN Santa        Pre-Procedural Diagnosis:Left parietal, occipital and temporal lobe AVM   Post-Procedural Diagnosis:Same as above       Procedure Performed:Diagnostic Cerebral Angiogram    Surgeon:   Cecilia Lewis MD    Fellow:  Parker Bhatia MD and Carolyn Bah MD     Assisting Tech:  Troy Madera    PRE-PROCEDURAL EXAM:  Prestroke baseline mRS MODIFIED ISRAEL SCORE: 0 - No symptoms at all. Neurological exam performed and unchanged from initial H&P or consult      Anesthesia: IV Moderate Sedation  Complications: none    Intra-Operative EXAM:  Neurological exam performed and unchanged from initial H&P or consult    EBL: < less than 50       Cc            Specimens: Were not Obtained  Contrast:     Visipaque 320 low osmolar 85 Cc             Fluoro: 24.9 min    Findings:  Please see dictated Radiology note for further details  Left occipital, temporal and parietal lobe Arterio-Venous malformation with nidus measuring about 39.44 mm x 33.18 mm fed by inferior division of the left MCA,  left occipital artery through dural branches, left PCA, right occipital artery through dural branches and right ICA via ACOM artery through inferior division of the left MCA with multiple fistulous points  2. Venous drainage by multiple cortical veins predominantly arterialized, engorged left middle cerebral vein into superior sagittal sinus   3. 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 No evidence of flow related or intra-nidal aneurysms   5.    Spetzler Brandon grade 3 ( size 3-6 cm, eloquent area, cortical venous drainage)        POST-PROCEDURAL EXAM :   Stable neurological Exam  Neurological exam performed and unchanged from initial H&P or consult    Closure: right Vascade 5   F        POST-PROCEDURAL MONITORING : see orders  Disposition: Recovery room      Recommendations:  Back to Recovery room  Do not bend right leg for 3 hours. Groin checks per protocol. Peripheral pulse checks per protocol. SBP goal 100-140 mm Hg   Follow up with Cynthia Weems MD  2 weeks after discharge and Dr. Pastor Higuera 3 months after discharge.         MD Walter Mittal MD   Pager [phone]  Stroke, [facility]  [address]  Electronically signed 11/1/2022 at 12:24 PM

## 2022-11-01 NOTE — DISCHARGE INSTRUCTIONS
Discharge Instructions for angiogram  Sebastián Dillard 60 to shower in AM. Discontinue band aid in AM.   Do not apply further band aids. Keep clean, dry and open to air. No powder or lotion. Do not soak in a pool or tub and do not swim for one week. If there is any bleeding at the catheter site, apply firm pressure with your hands until the bleeding stops. If bleeding continues after 3 minutes call 911. If there is any swelling or firm areas at your puncture site, this could be bleeding under the skin(hematoma), and if you have any concerns seek help immediately. .     If there is any bleeding at the catheter site, apply firm pressure with your hands until the bleeding stops. If bleeding continues after 3 minutes call 911. Drink plenty of fluids after the test. This will flush the x-ray dye from your system. Return to your normal diet. The sedative will make you sleepy. Rest until the effects have worn off. Ask your doctor when you will be able to return to work. Avoid heavy lifting objects greater than 10  pounds, physically demanding activities, and sexual activity for 5 days. Your activity will also depend upon where the catheter was inserted: Do not sit for long periods of time. Try to change positions frequently. Medications   If advised by your doctor, resume taking your normal medicines. Use acetaminophen (Tylenol) for pain relief. Do not take metformin (Glucophage) or glyburide and metformin (Glucovance) for 48 hours after the test.    If you had to stop taking these medications before the procedure, ask your doctor when you can resume taking them:   If youAnti-inflammatory drugs (eg, ibuprofen )   Blood thinners, such as warfarin (Coumadin)   Clopidogrel (Plavix)   If you are taking medicines, follow these general guidelines:   Take your medicine as directed. Do not change the amount or the schedule. Do not stop taking them without talking to your doctor.    Do not share them.   Know what the results and side effects. Report them to your doctor. Some drugs can be dangerous when mixed. Talk to a doctor or pharmacist if you are taking more than one drug. This includes over-the-counter medicine and herb or dietary supplements. Plan ahead for refills so you don't run out. Call Your Doctor If Any of the Following Occurs     :   Signs of infection, including fever and chills   Redness, swelling, increasing pain, excessive bleeding, or any discharge from the catheter insertion site   CALL 911 if you have symptoms including:   Drooping facial muscles   Changes in vision or speech   Difficulty walking or using your limbs   Change in sensation to affected leg, including numbness, feeling cold, or change in color   Extreme sweating, nausea or vomiting   Dizziness or lightheadedness   Chest pain   Rapid, irregular heartbeat   Palpitations   Cough, shortness of breath, or difficulty breathing   Weakness or fainting   If you think you have an emergency, CALL 911 . Discharge Instructions      SEDATION / ANALGESIA INFORMATION / HOME GOING ADVICE  You have received the sedation/analgesia medication during your visit    Sedation/analgesia is used during short medical procedures under controlled supervision. The medication will produce a strong relaxation. You will be able to hear, speak and follow instructions, but your memory and alertness will be decreased. You will be able to swallow and breathe on your own. During sedation/analgesia your blood pressure, heart and breathing will be watched closely. After the procedure, you may not remember what was said or done. You may have the following effects from the medication. \" Drowsiness, dizziness, sleepiness or confusion. \" Difficulty remembering or delayed reaction times. \" Loss of fine muscle control or difficulty with your balance especially while walking. \" Difficulty focusing or blurred vision.   You may not be aware of slight changes in your behavior and/or your reaction time because of the medication used during the procedure. Therefore you should follow these instructions. \" Have someone responsible help you with your care. \" Do not drive for 24 hours. \" Do not operate equipment for 24 hours (lawnmowers, power tools, kitchen accessories, stove). \" Do not drink any alcoholic beverages for a minimum of 24 hours. \" Do not make important personal, legal or business decisions for 24 hours. \" You may experience dizziness or lightheadedness. Move slowly and carefully, do not make sudden position changes. \" Drink extra amounts of fluids today. \" Increase your diet as tolerated (unless you have received specific instructions from your doctor). \" If you feel nauseated, continue with liquids until the nausea is gone. \" Notify your physician if you have not urinated within 8 hours after the procedure. \" Resume your medications unless otherwise instructed        Brain Angiogram  What is a brain angiogram?  A brain angiogram (cerebral angiogram) is a test (also called a procedure) that looks for problems with blood vessels and blow flow in the brain. These problems may include a bulge in a blood vessel (aneurysm), a narrowing or blockage of a blood vessel, or bleeding in the brain. The test may be used to check other symptoms, such as unusual headaches, or to check problems found during a different test.  The doctor puts a thin, flexible tube (catheter) into a blood vessel in your groin. Or the doctor may put the catheter in a blood vessel in your arm. During the procedure, the doctor moves the catheter through the blood vessel into your brain. Then he or she injects a dye into the catheter. The dye flows into the blood vessel. The dye makes the blood vessels show up on a video screen. A picture of the blood vessel in the brain can be seen on a video screen.  The doctor can look at the screen to see any problems with the blood vessels or blood flow. Follow-up care is a key part of your treatment and safety. Be sure to make and go to all appointments, and call your doctor if you are having problems. It's also a good idea to know your test results and keep a list of the medicines you take.

## 2022-11-01 NOTE — PROGRESS NOTES
Patient admitted, consent signed and questions answered. Patient ready for procedure. Call light to reach with side rails up 2 of 2. Family at bedside with patient. History and physical needs to be completed. Rosemarie Deshpande

## 2022-11-30 ENCOUNTER — OFFICE VISIT (OUTPATIENT)
Dept: NEUROLOGY | Age: 47
End: 2022-11-30
Payer: COMMERCIAL

## 2022-11-30 VITALS
OXYGEN SATURATION: 98 % | WEIGHT: 154 LBS | SYSTOLIC BLOOD PRESSURE: 118 MMHG | BODY MASS INDEX: 25.63 KG/M2 | RESPIRATION RATE: 16 BRPM | TEMPERATURE: 98.3 F | HEART RATE: 62 BPM | DIASTOLIC BLOOD PRESSURE: 79 MMHG

## 2022-11-30 DIAGNOSIS — G43.109 MIGRAINE WITH AURA AND WITHOUT STATUS MIGRAINOSUS, NOT INTRACTABLE: Primary | ICD-10-CM

## 2022-11-30 PROCEDURE — 99204 OFFICE O/P NEW MOD 45 MIN: CPT | Performed by: STUDENT IN AN ORGANIZED HEALTH CARE EDUCATION/TRAINING PROGRAM

## 2022-11-30 NOTE — PROGRESS NOTES
[address] [place] [address]  [address] [id number]  Dept: [phone]  Dept Fax: [phone]    NEUROLOGY NEW PATIENT NOTE       PATIENT NAME: Lee Ortiz  PATIENT MRN: [id number]  PRIMARY CARE PHYSICIAN: JUWAN Ledesma      HPI:      eLe Ortiz is a 49 y.o. male history of kidney stones, chronic headache for 20 years, Turkmen-speaking, previous CT a head showed nausea left MCA PCA site AVM, patient referred by neurosurgeon to neuro endovascular, last seen in the clinic was in November 2022, decision was made to proceed for DSA, patient was on Keppra 500 mg twice daily for headache, patient visited the ED multiple times, the last time was on 9/29/2022, for headache, left posterior occipital and radiates into the neck, denies any numbness tingling or weakness,  DSA on 11/2/2022: Left occipital, temporal and parietal lobe AVM with needles measuring about 59x53, patient will follow-up with his doctor LEVI in 2 weeks    Patient visited today our clinic for complaint of headache, he has been diagnosed with migraine for almost 20 years, usually migraine happening with blurry vision on the right eye, and then in generalized to the right head, happening almost once a month normal, Tylenol usually help with episode, getting hungry sometimes can trigger these headaches,    PREVIOUS WORKUP:     Lab Results   Component Value Date    WBC 7.2 09/29/2022    HGB 14.0 09/29/2022    HCT 40.6 (L) 09/29/2022    MCV 85.2 09/29/2022     09/29/2022       Past Medical History:   Diagnosis Date    Diabetes [facility])         Past Surgical History:   Procedure Laterality Date    CEREBRAL ANGIOGRAM  11/01/2022    KIDNEY STONE REMOVAL  2018        Social History     Socioeconomic History    Marital status:      Spouse name: Not on file    Number of children: Not on file    Years of education: Not on file    Highest education level: Not on file   Occupational History    Not on file   Tobacco Use    Smoking status: Never    Smokeless tobacco: Never   Substance and Sexual Activity    Alcohol use: Never    Drug use: Never    Sexual activity: Not on file   Other Topics Concern    Not on file   Social History Narrative    Not on file     Social Determinants of Health     Financial Resource Strain: Not on file   Food Insecurity: Not on file   Transportation Needs: Not on file   Physical Activity: Not on file   Stress: Not on file   Social Connections: Not on file   Intimate Partner Violence: Not on file   Housing Stability: Not on file        Current Outpatient Medications   Medication Sig Dispense Refill    Multiple Vitamin (MULTI VITAMIN MENS PO) Take 1 tablet by mouth      ibuprofen (ADVIL;MOTRIN) 600 MG tablet Take 1 tablet by mouth every 6 hours as needed for Pain (Patient not taking: Reported on 11/30/2022) 20 tablet 0     No current facility-administered medications for this visit. No Known Allergies     REVIEW OF SYSTEMS:     Review of Systems     VITALS  /79 (Site: Left Upper Arm, Position: Sitting, Cuff Size: Medium Adult)   Pulse 62   Temp 98.3 °F (36.8 °C)   Resp 16   Wt 154 lb (69.9 kg)   SpO2 98%   BMI 25.63 kg/m²      PHYSICAL EXAMINATION:     Physical Exam   General appearance: cooperative  Skin: no rash or skin lesions. HEENT: normocephalic  Optic Fundi: deferred  Neck: supple, no cervcical adenopathy or carotid bruit  Lungs: clear to auscultation  Heart: Regular rate and rhythm, normal S1, S2. No murmurs, clicks or gallops.   Peripheral pulses: radial pulses palpable  Abdominal: BS present, soft, NT, ND  Extremities: no edema    NEUROLOGICAL EXAMINATION:     GENERAL  Appears comfortable and in no distress   HEENT  NC/ AT   HEART  S1 and S2 heard; palpation of pulses: radial pulse    NECK  Supple and no bruits heard   MENTAL STATUS:  Alert, oriented, intact memory, no confusion, normal speech, normal language, no hallucination or delusion CRANIAL NERVES: II     -      Visual fields intact to confrontation  III,IV,VI -  PERR, EOMs full, no ptosis  V     -     Normal facial sensation   VII    -     Normal facial symmetry  VIII   -     Intact hearing   IX,X -     Symmetrical palate  XI    -     Symmetrical shoulder shrug  XII   -     Midline tongue, no atrophy    MOTOR FUNCTION: RUE: Significant for good strength of grade 5/5 in proximal and distal muscle groups   LUE: Significant for good strength of grade 5/5 in proximal and distal muscle groups   RLE: Significant for good strength of grade 5/5 in proximal and distal muscle groups   LLE: Significant for good strength of grade 5/5 in proximal and distal muscle groups      Normal bulk, normal tone and no involuntary movements, no tremor   SENSORY FUNCTION:  Normal touch, normal pin, normal vibration, normal proprioception   CEREBELLAR FUNCTION:  Intact fine motor control over upper limbs and lower limbs   REFLEX FUNCTION:  Symmetric in upper and lower extremities, no Babinski sign   STATION and GAIT  Normal gait and tandem station, normal tip toes and heel walking         ASSESSMENT:     Patient visited today our clinic for complaint of headache, he has been diagnosed with migraine for almost 20 years, usually migraine happening with blurry vision on the right eye, and then in generalized to the right head, happening almost once a month normal, Tylenol usually help with episode, getting hungry sometimes can trigger these headaches,      PLAN:     No need to start new medication given his low frequency and that Tylenol can help his headache  avoid hunger and starving since it is triggering headache  tylenol for episodes of headache  follow up after 6 months    Mr. Isaac Fan received counseling on the following healthy behaviors: medical compliance, smoking cessation, blood pressure control, regular follow up with primary doctor.         Electronically signed by Rahel Dawn MD on 11/30/2022 at 1:55 PM

## 2022-11-30 NOTE — PATIENT INSTRUCTIONS
- avoid hunger and starving since it is triggering headache  - tylenol for episodes of headache  - follow up after 6 months

## 2022-12-07 DIAGNOSIS — Q27.30 AVM (ARTERIOVENOUS MALFORMATION): Primary | ICD-10-CM

## 2022-12-09 NOTE — PROGRESS NOTES
Spoke with patient's daughter and informed her of the MRI order, transferred her to central scheduling to have MRI done for patient.

## 2022-12-22 ENCOUNTER — OFFICE VISIT (OUTPATIENT)
Dept: NEUROLOGY | Age: 47
End: 2022-12-22

## 2022-12-22 ENCOUNTER — HOSPITAL ENCOUNTER (OUTPATIENT)
Dept: MRI IMAGING | Age: 47
Discharge: HOME | End: 2022-12-24

## 2022-12-22 VITALS
TEMPERATURE: 97.3 F | HEIGHT: 65 IN | SYSTOLIC BLOOD PRESSURE: 111 MMHG | OXYGEN SATURATION: 98 % | BODY MASS INDEX: 25.66 KG/M2 | DIASTOLIC BLOOD PRESSURE: 77 MMHG | HEART RATE: 85 BPM | WEIGHT: 154 LBS

## 2022-12-22 DIAGNOSIS — Q28.2 AVM (ARTERIOVENOUS MALFORMATION) BRAIN: Primary | ICD-10-CM

## 2022-12-22 DIAGNOSIS — Q27.30 AVM (ARTERIOVENOUS MALFORMATION): ICD-10-CM

## 2022-12-22 PROCEDURE — A9576 INJ PROHANCE MULTIPACK: HCPCS | Performed by: NEUROLOGICAL SURGERY

## 2022-12-22 PROCEDURE — 6360000004 HC RX CONTRAST MEDICATION: Performed by: NEUROLOGICAL SURGERY

## 2022-12-22 PROCEDURE — 70553 MRI BRAIN STEM W/O & W/DYE: CPT

## 2022-12-22 PROCEDURE — 99214 OFFICE O/P EST MOD 30 MIN: CPT | Performed by: PSYCHIATRY & NEUROLOGY

## 2022-12-22 PROCEDURE — 2580000003 HC RX 258: Performed by: NEUROLOGICAL SURGERY

## 2022-12-22 RX ORDER — SODIUM CHLORIDE 0.9 % (FLUSH) 0.9 %
10 SYRINGE (ML) INJECTION PRN
Status: DISCONTINUED | OUTPATIENT
Start: 2022-12-22 | End: 2022-12-25 | Stop reason: HOSPADM

## 2022-12-22 RX ADMIN — GADOTERIDOL 12 ML: 279.3 INJECTION, SOLUTION INTRAVENOUS at 15:54

## 2022-12-22 RX ADMIN — SODIUM CHLORIDE, PRESERVATIVE FREE 10 ML: 5 INJECTION INTRAVENOUS at 15:54

## 2022-12-22 ASSESSMENT — ENCOUNTER SYMPTOMS
COLOR CHANGE: 0
VOMITING: 0
COUGH: 0
VOICE CHANGE: 0
PHOTOPHOBIA: 0
SHORTNESS OF BREATH: 0
NAUSEA: 0

## 2022-12-22 NOTE — PROGRESS NOTES
Endovascular Neurosurgery - [address], [address], [address] [place] [address]  P: [phone]  F: [phone]      Dear JUWAN Patrick    HPI:     STACY    Jennifer Valadez is a 49 y.o. male who presents today after his referral from Dr. Gwyn Haley with November 1, 2022 DSA for his left parieto occipital avm. Doing well with no new neuro deficits. No visual changes or loss. He has an MRI brain with and without honey later this afternoon. No new headaches. Stable migraines      No Known Allergies  Current Outpatient Medications   Medication Sig Dispense Refill    Acetaminophen (TYLENOL 8 HOUR PO) Take by mouth As needed. Multiple Vitamin (MULTI VITAMIN MENS PO) Take 1 tablet by mouth      ibuprofen (ADVIL;MOTRIN) 600 MG tablet Take 1 tablet by mouth every 6 hours as needed for Pain (Patient not taking: No sig reported) 20 tablet 0     No current facility-administered medications for this visit. Past Medical History:   Diagnosis Date    Diabetes [facility])       Past Surgical History:   Procedure Laterality Date    CEREBRAL ANGIOGRAM  11/01/2022    KIDNEY STONE REMOVAL  2018     No family history on file. Social History     Tobacco Use    Smoking status: Never    Smokeless tobacco: Never   Substance Use Topics    Alcohol use: Never        Subjective:     Review of Systems   Constitutional:  Negative for fever and unexpected weight change. HENT:  Negative for voice change. Eyes:  Negative for photophobia and visual disturbance. Respiratory:  Negative for cough and shortness of breath. Cardiovascular:  Negative for chest pain and palpitations. Gastrointestinal:  Negative for nausea and vomiting. Musculoskeletal:  Negative for neck stiffness. Skin:  Negative for color change and pallor. Neurological:  Negative for weakness and headaches. Psychiatric/Behavioral:  Negative for confusion and decreased concentration.         Objective:     /77 (Site: Left Upper Arm, Position: Sitting, Cuff Size: Large Adult)   Pulse 85   Temp 97.3 °F (36.3 °C) (Temporal)   Ht 5' 5\" (1.651 m)   Wt 154 lb (69.9 kg)   SpO2 98%   BMI 25.63 kg/m²   Physical Exam  Gen: Sitting in chair, nad  CV:RRR  NEURO: alert and oriented x 3 intact language attention and knowledge  CN: eomi, perrl, v1-v3 intact no facial asymmetry, midline tongue  Motor 5/5 bue/ble  COORD: no dysmetria  Sensory: intact lt    2022 MRI brain            Assessment:        Blane Morrissey is a 49 y.o. male who is Lithuanian speaking with a 15 year history of known left parieto occipital avm with 2022 cerebral angiogram.   Diagnosis Orders   1. AVM (arteriovenous malformation) brain            Recommendations:      Return in about 3 months (around 3/22/2023). MRI brain with and without honey today  Followup with Dr. Clint Garibay and neuroendo  for treatment planning. Established Patient Visit Time: 35 minutes  Time Spent in Counselin minutes  Greater than 50% of the time in this visit was spent counseling and coordinating care of this patient. Patient given educational materials - see patient instructions. Discussed use, benefit, and side effects of prescribed medications. Personally reviewed imaging with patients and all questions answered. Pt voiced understanding. Patient agreed with treatment plan. Follow up as directed above. If you have any questions, please do not hesitate to call me.   I look forward to following Blane Morrissey      Sincerely,        Shahzad Gutiérrez MD, MD  Electronically signed by Shahzad Gutiérrez MD, MD on 2022 at 2:04 PM

## 2022-12-30 ENCOUNTER — OFFICE VISIT (OUTPATIENT)
Dept: NEUROSURGERY | Age: 47
End: 2022-12-30

## 2022-12-30 VITALS
HEIGHT: 65 IN | TEMPERATURE: 97.7 F | DIASTOLIC BLOOD PRESSURE: 71 MMHG | BODY MASS INDEX: 25.83 KG/M2 | SYSTOLIC BLOOD PRESSURE: 108 MMHG | HEART RATE: 66 BPM | OXYGEN SATURATION: 98 % | WEIGHT: 155 LBS

## 2022-12-30 DIAGNOSIS — G43.109 MIGRAINE WITH AURA AND WITHOUT STATUS MIGRAINOSUS, NOT INTRACTABLE: Primary | ICD-10-CM

## 2022-12-30 DIAGNOSIS — Q27.30 AVM (ARTERIOVENOUS MALFORMATION): ICD-10-CM

## 2022-12-30 PROCEDURE — 99214 OFFICE O/P EST MOD 30 MIN: CPT | Performed by: NEUROLOGICAL SURGERY

## 2022-12-30 NOTE — PROGRESS NOTES
Department of Neurosurgery                                                      Follow up visit      History Obtained From:  patient, spouse, family members - daughter and 2 sons    CHIEF COMPLAINT:         Chief Complaint   Patient presents with    Other    Neurologic Problem       HISTORY OF PRESENT ILLNESS:       The patient is a 49 y.o. male who presents for follow up for AVM (arteriovenous malformation) brain and Migraine with aura and without status migrainosus, not intractable. Patient is Swedish speaking and his daughter translated for him during today's visit. His daughter states he is not taking any keppra. He reports he has had some headaches, but they now occur less frequently. When headaches occur there is blurry vision on the right side followed by pain in the center of forehead. He states that he takes OTC tylenol as needed for headaches, but no longer takes ibuprofen. Headaches are provoked by hunger. Daughter states he saw an optometrist for the visual field test, who stated that there were no abnormalities in vision.     PAST MEDICAL HISTORY :       Past Medical History:        Diagnosis Date    Diabetes [facility])        Past Surgical History:        Procedure Laterality Date    CEREBRAL ANGIOGRAM  11/01/2022    KIDNEY STONE REMOVAL  2018       Social History:   Social History     Socioeconomic History    Marital status:      Spouse name: Not on file    Number of children: Not on file    Years of education: Not on file    Highest education level: Not on file   Occupational History    Not on file   Tobacco Use    Smoking status: Never    Smokeless tobacco: Never   Substance and Sexual Activity    Alcohol use: Never    Drug use: Never    Sexual activity: Not on file   Other Topics Concern    Not on file   Social History Narrative    Not on file     Social Determinants of Health     Financial Resource Strain: Not on file   Food Insecurity: Not on file   Transportation Needs: Not on file   Physical Activity: Not on file   Stress: Not on file   Social Connections: Not on file   Intimate Partner Violence: Not on file   Housing Stability: Not on file       Family History:   No family history on file. Allergies:  Patient has no known allergies. Home Medications:  Prior to Admission medications    Medication Sig Start Date End Date Taking? Authorizing Provider   Acetaminophen (TYLENOL 8 HOUR PO) Take by mouth As needed. Yes Historical Provider, MD   Multiple Vitamin (MULTI VITAMIN MENS PO) Take 1 tablet by mouth   Yes Historical Provider, MD       Current Medications:   No current facility-administered medications for this visit. PHYSICAL EXAM:       /71 (Site: Right Upper Arm, Position: Sitting, Cuff Size: Large Adult)   Pulse 66   Temp 97.7 °F (36.5 °C)   Ht 5' 5\" (1.651 m)   Wt 155 lb (70.3 kg)   SpO2 98%   BMI 25.79 kg/m²   Physical Exam     Gen: NAD  HEENT: moist mucus membranes  Cardio: RRR  Pulm: chest rise symmetrically  GI: abd soft  Ext: no edema  Skin: warm    Neuro:    AOX3  CN 2-12 grossly intact  Speech articulate  Motor 5/5  No pronator drift  Sensation symmetrical       Radiology Review:    Visual field test  10/17/2022  Official read:  No report found    IR angiogram carotid cerebral bilateral  11/01/2022  Official read:  1. Left occipital, temporal and parietal lobe arterio-venous malformation with nidus measuring about 39.44 mm x 33.18 mm fed by inferior division of the left MCA, ? left occipital artery through dural branches, left PCA, right occipital artery through   dural branches and right ICA via ACOM artery through inferior division of the left MCA with multiple fistulous points  2. ? ? Venous drainage by multiple cortical veins predominantly arterialized, engorged left middle cerebral vein into superior sagittal sinus, retrograde filling of the vein of Judy with drainage superiorly to the anterior superior sagittal sinus, and   deep venous drainage into the straight sinus. 3. ?? 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 ? ? No evidence of flow related or intra-nidal aneurysms   5. ? ? Spetzler Brandon grade 3 ( size 3-6 cm, eloquent area, cortical venous drainage)    MRI brain w wo contrast  12/22/2022  Official read:  Findings consistent with left occipito temporoparietal arteriovenous  malformation with the nidus measuring approximately 3.9 x 3.2 by 2.3 cm. The  arterial supply appears to arise from the left MCA and left PCA which appear  enlarged and tortuous. The venous drainage appears to be through by multiple  cortical veins which are significantly tortuous and engorged into the  superior sagittal sinus and straight sinus. No acute infarction, intracranial hemorrhage or mass lesion. My read:      ASSESSMENT AND PLAN:       Patient Active Problem List   Diagnosis    Left flank pain, chronic    Type 2 diabetes mellitus without complication (HCC)    AVM (arteriovenous malformation) brain    Migraine with aura and without status migrainosus, not intractable    AVM (arteriovenous malformation)         A/P:  This is a 49 y.o. male with Migraine with aura and without status migrainosus, not intractable  AVM (arteriovenous malformation) , SM grade 3    No visual field defect    Migraine not frequent with known triggers    I thoroughly discussed details of diagnosis, natural histories of disease, and treatment options. We discussed surgical and non-surgical options, namely embolization and AVM resection. I detailed the benefits, risks, recovery period, and alternatives of this surgery. I used the imaging to depict the surgery and diagnosis to the patient. Given natural history of AVM and patient is relatively young, his life time hemorrhage risk with morbidity/mortality will likely outweigh  surgical risk. Option for continued observation was discussed.  He has spoke to his family and they quite anxious with the observation option and would prefer definitive treatment and would like to proceed    I will coordinate with     Patient and/or family was counseled on the diagnosis and treatment plan    By signing my name below, I, John Vaughn, attest that this documentation has been prepared under the direction and in the presence of Palmira Lopez DO. Electronically signed: Ceasar Kuo, 12/30/22     This note was created using voice recognition software. There may be inaccuracies of transcription  that are inadvertently overlooked prior to the signature. There is any questions about the transcription please contact me.

## 2023-01-11 ENCOUNTER — HOSPITAL ENCOUNTER (EMERGENCY)
Age: 48
Discharge: HOME | End: 2023-01-11
Attending: FAMILY MEDICINE
Payer: COMMERCIAL

## 2023-01-11 ENCOUNTER — APPOINTMENT (OUTPATIENT)
Dept: GENERAL RADIOLOGY | Age: 48
End: 2023-01-11
Payer: COMMERCIAL

## 2023-01-11 VITALS
SYSTOLIC BLOOD PRESSURE: 117 MMHG | BODY MASS INDEX: 25.83 KG/M2 | HEART RATE: 75 BPM | DIASTOLIC BLOOD PRESSURE: 75 MMHG | HEIGHT: 65 IN | OXYGEN SATURATION: 94 % | RESPIRATION RATE: 20 BRPM | TEMPERATURE: 97.2 F | WEIGHT: 155 LBS

## 2023-01-11 DIAGNOSIS — Z86.39 HISTORY OF DIABETES MELLITUS: ICD-10-CM

## 2023-01-11 DIAGNOSIS — J40 BRONCHITIS: Primary | ICD-10-CM

## 2023-01-11 PROCEDURE — 99283 EMERGENCY DEPT VISIT LOW MDM: CPT

## 2023-01-11 PROCEDURE — 71046 X-RAY EXAM CHEST 2 VIEWS: CPT

## 2023-01-11 RX ORDER — DEXTROMETHORPHAN HYDROBROMIDE AND PROMETHAZINE HYDROCHLORIDE 15; 6.25 MG/5ML; MG/5ML
5 SYRUP ORAL 4 TIMES DAILY PRN
Qty: 140 ML | Refills: 0 | Status: SHIPPED | OUTPATIENT
Start: 2023-01-11 | End: 2023-01-18

## 2023-01-11 RX ORDER — AMOXICILLIN AND CLAVULANATE POTASSIUM 875; 125 MG/1; MG/1
1 TABLET, FILM COATED ORAL 2 TIMES DAILY
Qty: 20 TABLET | Refills: 0 | Status: SHIPPED | OUTPATIENT
Start: 2023-01-11 | End: 2023-01-21

## 2023-01-11 ASSESSMENT — PAIN - FUNCTIONAL ASSESSMENT: PAIN_FUNCTIONAL_ASSESSMENT: NONE - DENIES PAIN

## 2023-01-12 ASSESSMENT — ENCOUNTER SYMPTOMS
RHINORRHEA: 0
COUGH: 1

## 2023-01-12 NOTE — ED PROVIDER NOTES
[facility]  eMERGENCY dEPARTMENT eNCOUnter          [address]       Chief Complaint   Patient presents with    Cough     C/o cough for the past month. Nurses Notes reviewed and I agree except as noted in the HPI. HISTORY OF PRESENT ILLNESS    Jennifer Valadez is a 49 y.o. male who presents to the emergency room via private vehicle with [employer] member who is acting as , patient states entire family sick about 1.5 months ago, patient having fever chills and body aches at that time, mild cough, states since then symptoms have primarily resolved though continues to have cough, sometimes causing some discomfort in his chest, and often keeping him up at night. Patient denies any rhinorrhea congestion or postnasal drainage. Patient does have noted AVM, son states is causing him some discomfort when he coughs too much pain in the back of his head. REVIEW OF SYSTEMS     Review of Systems   Constitutional:  Negative for fever. HENT:  Negative for congestion, postnasal drip and rhinorrhea. Respiratory:  Positive for cough. All other systems reviewed and are negative. PAST MEDICAL HISTORY    has a past medical history of Diabetes ([facility] [address] SURGICAL HISTORY      has a past surgical history that includes Cerebral angiogram (11/01/2022) and Kidney stone removal (2018). CURRENT MEDICATIONS       Discharge Medication List as of 1/11/2023  6:35 PM        CONTINUE these medications which have NOT CHANGED    Details   Acetaminophen (TYLENOL 8 HOUR PO) Take by mouth As needed. Historical Med      Multiple Vitamin (MULTI VITAMIN MENS PO) Take 1 tablet by mouthHistorical Med             ALLERGIES     has No Known Allergies. FAMILY HISTORY     He indicated that his mother is alive. He indicated that his father is alive. family history is not on file. SOCIAL HISTORY      reports that he has never smoked.  He has never used smokeless tobacco. He reports that he does not drink alcohol and does not use drugs. PHYSICAL EXAM     INITIAL VITALS:  height is 5' 5\" (1.651 m) and weight is 155 lb (70.3 kg). His temporal temperature is 97.2 °F (36.2 °C). His blood pressure is 117/75 and his pulse is 75. His respiration is 20 and oxygen saturation is 94%. Physical Exam   Constitutional: Patient is oriented to person, place, and time. Patient appears well-developed and well-nourished. Patient is active and cooperative. HENT:   Head: Normocephalic and atraumatic. Head is without contusion. Right Ear: Hearing and external ear normal. No drainage. Mild effusion without erythema  Left Ear: Hearing and external ear normal. No drainage. Mild effusion without erythema  Nose: Nose normal. No nasal deformity. No epistaxis. Mouth/Throat: Mucous membranes are not dry. Negative oral lesions or exudate  Eyes: EOMI. Conjunctivae, sclera, and lids are normal. Right eye exhibits no discharge. Left eye exhibits no discharge. Neck: Full passive range of motion without pain and phonation normal.   Cardiovascular:  Normal rate, regular rhythm and intact distal pulses. Pulses: Right radial pulse  2+   Pulmonary/Chest: Effort normal. No tachypnea and no bradypnea. Fine central coarseness without gross rhonchi rales or stridor  Abdominal: BMI 25.7, soft. Patient without distension   Musculoskeletal:   Negative acute trauma or deformity,  apparent full range of motion and normal strength all extremities appropriate to age. Neurological: Patient is alert and oriented to person, place, and time. patient displays no tremor. Patient displays no seizure activity. .  Lymphatic: No gross cervical lymphadenopathy  Skin: Skin is warm and dry. Patient is not diaphoretic. Psychiatric: Patient has a normal mood and affect.  Patient speech is normal and behavior is normal. Cognition and memory are normal.     DIFFERENTIAL DIAGNOSIS:   Pneumonia bronchitis URI, viral illness NOS    DIAGNOSTIC RESULTS           RADIOLOGY: non-plain film images(s) such as CT, Ultrasound and MRI are read by the radiologist.  XR CHEST (2 VW)   Final Result   1. Expiratory chest without acute cardiopulmonary disease. LABS:   Labs Reviewed - No data to display    MIPS    #116 - Avoidance of Antibiotic Treatment for Acute Bronchitis/Bronchiolitis    [x]The patient has acute bronchitis/ bronchiolitis. Antibiotics were prescribed or dispensed because the patient meets one of the following: [MIPS PERFORMANCE EXCEPTION/EXCLUSION]  [x]Patient has a medical reason for prescribing or dispensing an antibiotic. That reason is onset of symptoms greater than 10 days, patient is a diabetic       MEDICAL DECISION MAKING   Vitals:    Vitals:    01/11/23 1718   BP: 117/75   Pulse: 75   Resp: 20   Temp: 97.2 °F (36.2 °C)   TempSrc: Temporal   SpO2: 94%   Weight: 155 lb (70.3 kg)   Height: 5' 5\" (1.651 m)     Patient history and physical exam taken with patient sitting upright in chair, son present acting as , discussed getting two-view chest x-ray and will reevaluate, acknowledged    Imaging reviewed, no gross consolidation effusion or pneumothorax    Discussed with patient and patient's son imaging findings, discussed symptoms more consistent with bronchitis, given the patient is both a diabetic as well as symptom onset greater than 10 days, will treat with antibiotics, confirmed allergies, initiate patient on Augmentin twice daily for 10 days, we discussed OTC cough cold medications were may be of benefit such as Mucinex DM, given that the cough is keeping patient up at night, will prescribe Phenergan DM for cough suppression, advising it often can make him sleepy and be careful with it during the daytime, avoid any driving. Patient vies close follow-up with established primary care, return to ER if symptoms change worse other concerns, acknowledged    FINAL IMPRESSION      1. Bronchitis    2.  History of diabetes mellitus          DISPOSITION/PLAN   D/c    PATIENT [facility]:  [facility]  [place] [address]  [phone]    Call       [facility] ED  [address] [address] [phone]    As needed      DISCHARGE MEDICATIONS:  Discharge Medication List as of 1/11/2023  6:35 PM        START taking these medications    Details   promethazine-dextromethorphan (PROMETHAZINE-DM) 6.25-15 MG/5ML syrup Take 5 mLs by mouth 4 times daily as needed for Cough, Disp-140 mL, R-0Normal      amoxicillin-clavulanate (AUGMENTIN) 875-125 MG per tablet Take 1 tablet by mouth 2 times daily for 10 days, Disp-20 tablet, R-0Normal                 Summation          ED Medications administered this visit:  Medications - No data to display    New Prescriptions from this visit:    Discharge Medication List as of 1/11/2023  6:35 PM        START taking these medications    Details   promethazine-dextromethorphan (PROMETHAZINE-DM) 6.25-15 MG/5ML syrup Take 5 mLs by mouth 4 times daily as needed for Cough, Disp-140 mL, R-0Normal      amoxicillin-clavulanate (AUGMENTIN) 875-125 MG per tablet Take 1 tablet by mouth 2 times daily for 10 days, Disp-20 tablet, R-0Normal             Follow-up:  JUWAN Collier  [facility] [address]  [phone]    Call       [facility] ED  [address] [id number] [phone]    As needed        Final Impression:   1. Bronchitis    2.  History of diabetes mellitus               (Please note that portions of this note were completed with a voice recognition program.  Efforts were made to edit the dictations but occasionally words are mis-transcribed.)    MD Kika Gu MD  01/12/23 0126

## 2023-01-24 ENCOUNTER — HOSPITAL ENCOUNTER (OUTPATIENT)
Age: 48
Discharge: HOME | End: 2023-01-24
Payer: COMMERCIAL

## 2023-01-24 PROCEDURE — 86480 TB TEST CELL IMMUN MEASURE: CPT

## 2023-01-24 PROCEDURE — 86765 RUBEOLA ANTIBODY: CPT

## 2023-01-24 PROCEDURE — 86787 VARICELLA-ZOSTER ANTIBODY: CPT

## 2023-01-24 PROCEDURE — 86780 TREPONEMA PALLIDUM: CPT

## 2023-01-24 PROCEDURE — 36415 COLL VENOUS BLD VENIPUNCTURE: CPT

## 2023-01-24 PROCEDURE — 86762 RUBELLA ANTIBODY: CPT

## 2023-01-24 PROCEDURE — 86735 MUMPS ANTIBODY: CPT

## 2023-01-25 LAB
MEASLES ANTIBODY IGG: 1.67
MUV IGG SER QL: 1.57
RUBV IGG SER QL: <0.2 IU/ML
T. PALLIDUM, IGG: NONREACTIVE
VZV IGG SER QL IA: 1.19

## 2023-01-27 LAB
QUANTI TB GOLD PLUS: NEGATIVE
QUANTI TB1 MINUS NIL: 0.01 IU/ML (ref 0–0.34)
QUANTI TB2 MINUS NIL: 0 IU/ML (ref 0–0.34)
QUANTIFERON MITOGEN: >10 IU/ML
QUANTIFERON NIL: 0.04 IU/ML

## 2023-02-02 ENCOUNTER — TELEPHONE (OUTPATIENT)
Dept: NEUROSURGERY | Age: 48
End: 2023-02-02

## 2023-02-02 NOTE — TELEPHONE ENCOUNTER
Patient's daughter Deepa Geronimo LVCHAPINCITO ([phone]) inquiring about surgery planning for her father. Has a plan been established and does patient need to be scheduled for anything.

## 2023-03-13 RX ORDER — SODIUM CHLORIDE, SODIUM LACTATE, POTASSIUM CHLORIDE, CALCIUM CHLORIDE 600; 310; 30; 20 MG/100ML; MG/100ML; MG/100ML; MG/100ML
1000 INJECTION, SOLUTION INTRAVENOUS CONTINUOUS
OUTPATIENT
Start: 2023-03-13

## 2023-03-13 NOTE — DISCHARGE INSTRUCTIONS
Pre-operative Instructions           NOTHING to eat or drink after midnight the night prior to surgery   (This includes gum, candy, mints, chewing tobacco, etc). Please arrive at the surgery center (Entrance B) by 5:30-5:45 AM on 3/30/2023 (or as directed by your surgeon's office). See Directons to Surgery Center below. Please take only the following medication(s) the day of surgery with a small sip of water:     Please stop any blood thinning medications  AS DIRECTED BY PRESCRIBING PROVIDER! :   Failure to stop these medications as instructed (too soon or too late) may result in injury to you, or your surgery may need to be rescheduled. Below is a list of some examples for your reference. Antiplatelets : (stop blood cells (called platelets) from sticking together and forming a blood clot):   Aspirin (Bufferin, Ecotrin), Clopidogrel (Plavix), Ticagrelor (Brilinta), Prasugrel (Effient), Dipyridamole/aspirin (Aggrenox), Ticlopidine (Ticlid), Eptifibatide (Integrilin)    Anticoagulants: (slow down your body's process of making clots): Warfarin (Coumadin), Rivaroxaban (Xarelto), Dabigatran (Pradaxa), Apixaban (Eliquis), Edoxaban (Savaysa), Heparin/Enoxaparin (Lovenox), Fondaparinux (Arixtra)    NSAIDS: Aspirin (Bufferin, Ecotrin), Ibuprofen (Motrin, Nuprin,Advil), Naproxen (Aleve),Meloxicam (Mobic), Celecoxib (Celebrex), Diclofenac (Voltaren), Etodolac (Lodine), Indomethacin, Ketorolac, Nabumetone, Oxaprozin (Daypro), Piroxicam (Feldene), Excedrin (has aspirin in it)    Herbal supplements: Bromelain, Cinnamon, [employer], [place] (female ginseng), Fish oil, Garlic, Angelica,Ginkgo biloba, Grape seed extract, Turmeric, Vitamin E, etc....)    You may continue the rest of your medications through the night before surgery unless instructed otherwise. If applicable:   Do not take diabetic medications on the day of surgery.   Please use/bring daily inhalers with you     PLEASE NOTE:  THE ABOVE (IF ANY) DISCONTINUED MEDS MAY ONLY BE FROM   \"CLEANING UP\" THE MED LIST AND WERE NOT ACTUALLY CANCELLED; SEE CHART FOR DETAILS AND ALWAYS CHECK WITH PRESCRIBING PROVIDER BEFORE DISCONTINUING ANY MEDICATIONS    3/16/23  8:55 AM      ___________________  _______________________  Signature (Provider)              Signature (Patient)         Day of Surgery/Procedure    As a patient at [facility] you can expect quality medical and nursing care that is centered on your individual needs. Our goal is to make your surgical experience as comfortable as possible    Directions to the [address] [place] [address] at [facility]. [facility] is located in the Emergency Room parking lot on [facility] or there is additional parking across the street. The address is [address] [place] [address]. Please check in at the [facility] upon arrival.     Patient Instructions  In case of any illness please contact your surgeons office for instructions prior to coming to the hospital.    Due to current restrictions you are only allowed 2 adults to be with you. Masks are to be worn and screening will take place on arrival.     Bring your current list of medications, vitamins, herbals and anything you might take on an  \"as needed \" basis. It is important we have a correct list with dosages and frequencies. Please verify your list with your medications at home or bring all of your bottles with you. If you have been given a blood band be sure to bring it with you on the day of surgery. Do not put it on or close the clasp. Use and bring any inhalers if you are currently using one. It is ok to brush your teeth but do not swallow any water. You may be required to provide a urine sample upon your arrival to the pre-op area, so please take this into consideration prior to using the restroom. No jewelry or piercing's to be worn into surgery because you might be injured because of them.     No contact lenses to be worn.  It is ok to wear your glasses in pre op but they will be removed prior to going into the operating room. Dentures/partials will likely need to be removed in pre op depending on your type of anesthesia, please do not use adhesives on the day of surgery. Bathe as instructed with the special soap given to you. No lotions, powders or creams after bathing. Wear loose comfortable clothing / shoes that are easy to get on and off over wounds or casts. If you are going to be admitted after surgery please bring your Cpap or BiPap if you have it at home. Keep patient belongings to a minimum and leave valuables at home. If you are staying overnight with us, please bring a SMALL bag of personal items. We cannot accommodate large items, like suitcases. If you are going home after anesthesia or sedation then you must have a responsible adult with you to take you home and to be with you for the first 24 hours after surgery. If you do not have someone to stay with you your surgery might get cancelled. Please contact your surgeon's office to see if other arrangements can be made if you can not find someone to stay with you. If you have any other questions on the day of surgery please contact [phone] or [phone]    If you have any other questions regarding your procedure/surgery please call  your surgeon's office.

## 2023-03-15 NOTE — H&P
History and Physical    Pt Name: Amisha Gross  MRN: [id number]  YOB: 1975  Date of evaluation: 3/16/2023  Primary Care Physician: JUWAN Huggins    SUBJECTIVE:   History of Chief Complaint:    Amisha Gross is a 51 y.o. male who presents for PAT appointment. He is present with his spouse and 2 children. Patient complains of AVM. He reports this was diagnosed at least 15 years ago, but that surgical options were never discussed with him at that time. He reports worsening headache( left sided occipital pain/pressure, different than his usual migraines) over the past several months which he pursued evaluation for. He had cerebral angiogram with Dr. Dionna Vicente in November. Patient has opted for surgical intervention. Patient has been scheduled for Jake [address] Dr[address] (DR GALLEGOS, DR. Cristina De Paz TO ASSIST)  Allergies  has No Known Allergies. Medications  Prior to Admission medications    Medication Sig Start Date End Date Taking? Authorizing Provider   Acetaminophen (TYLENOL 8 HOUR PO) Take by mouth As needed. Historical Provider, MD     Past Medical History    has a past medical history of AVM (arteriovenous malformation), Diabetes ([facility] [address] History of migraine, Kidney stone, and Under care of service provider. Past Surgical History   has a past surgical history that includes Cerebral angiogram (11/01/2022) and Kidney stone removal (2018). Social History   reports that he has never smoked. He has never used smokeless tobacco.    reports no history of alcohol use. reports no history of drug use. Marital Status   Children 3  Occupation   Family History  Family Status   Relation Name Status    Mother  [de-identified]    Father  Alive     family history includes No Known Problems in his father and mother.     Review of Systems:  CONSTITUTIONAL:   negative for fevers, chills, fatigue and malaise +tired    EYES:   negative for double vision, blurred vision and photophobia    HEENT: negative for tinnitus, epistaxis and sore throat     RESPIRATORY:   negative for cough, shortness of breath, wheezing     CARDIOVASCULAR:   negative for chest pain, palpitations, syncope, edema     GASTROINTESTINAL:   negative for nausea, vomiting     GENITOURINARY:   negative for incontinence     MUSCULOSKELETAL:   negative for neck or back pain     NEUROLOGICAL:   Negative for weakness and tingling  +see HPI   PSYCHIATRIC:   negative for anxiety       OBJECTIVE:   VITALS:  height is 5' 5\" (1.651 m) and weight is 158 lb (71.7 kg). His temporal temperature is 97.9 °F (36.6 °C). His blood pressure is 113/73 and his pulse is 60. His respiration is 18 and oxygen saturation is 100%. CONSTITUTIONAL:alert & oriented x 3, no acute distress. Friendly. Very pleasant. SKIN:  Warm and dry, no rashes on exposed areas of skin   HEAD:  Normocephalic, atraumatic   EYES: EOMs intact. PERRL. EARS:  Equal bilaterally, no edema or thickening, skin is intact without lumps or lesions. No discharge. Hearing grossly WNL. NOSE:  Nares patent. No rhinorrhea   MOUTH/THROAT:  Mucous membranes moist, tongue is pink, uvula midline, teeth appear to be intact, tonsils 2 + bilaterally  NECK:full ROM  LUNGS: Respirations even and non-labored. Clear to auscultation bilaterally, no wheezes, rales, or rhonchi. CARDIOVASCULAR: Regular rate and rhythm, no murmurs/rubs/gallops   ABDOMEN: soft, non-tender, non-distended, bowel sounds active x 4   EXTREMITIES: No edema bilateral lower extremities. No varicosities bilateral lower extremities. NEUROLOGIC: CN II-XII are grossly intact.   Gait is smooth, rhythmic and effortless   Testing:   EKG: 3/16/23  Labs pending: drawn 3/16/2023   IMPRESSIONS:   AVM    PLANS:   PARIETAL OCCIPITAL CRANIOTOMY  - LEFT  (Dr. Shamika Paul, Dr. Dionna Vicente to assist)    ISA Patel - CNP  Electronically signed 3/16/2023 at 10:01 AM

## 2023-03-15 NOTE — PROGRESS NOTES
Anesthesia Focused Assessment    Hx of anesthesia complications:  no  Family hx of anesthesia complications:  no    METS functional capacity:   -Moderate/Excellent: >4 climbing >/= 1 flight of stairs without stopping or walking up hill  >1-2 block      + Covid-19 test in last 8 weeks? no  Covid vaccinated?: yes      STOP-BANG Sleep Apnea Questionnaire    SNORE loudly (heard through closed doors)? Yes  TIRED, fatigued, sleepy during daytime? Yes  OBSERVED stopping breathing during sleep? No  High blood PRESSURE or being treated? No    BMI over 35? No  AGE over 48? No  NECK circumference over 16\"? No  GENDER (male)? Yes             Total 3  High risk 5-8  Intermediate risk 3-4  Low risk 0-2    ----------------------------------------------------------------------------------------------------------------------  CHACORTA:                                             no  If yes, machine?:                              Type 1 DM:                                   no  T2DM:                                           yes, diet controlled    Coronary Artery Disease:             no  Hypertension:                               no  Defib / AICD / Pacemaker:           no  EKG today: SB 54, no cardiopulmonary complaints. METS >4. Works as a . No prior cardiac history. Renal Failure:                               no  If yes, on dialysis? :                           Active smoker:                              no  Drinks Alcohol:                              occasional  Illicit drugs:                                    no    Dentition:                                      crowns x 3, intact    Past Medical History:   Diagnosis Date    AVM (arteriovenous malformation)     Diabetes ([facility] [address]     History of migraine     Kidney stone     Under care of service provider 03/16/2023    [facility]-last visit jan 2023     Patient was evaluated in PAT & anesthesia guidelines were applied.    NPO guidelines, medication instructions and scheduled arrival time were reviewed with patient and his wife/older children. I advised patient/patient family to please contact surgeons office, ahead of time if possible, if any new signs or symptoms of illness, infection, rash, etc. Patient/ patient family verbalize understanding.                                                                                                                       Anesthesia contacted:   no    Medical or cardiac clearance ordered: ISA Hunt CNP  Electronically signed 3/16/2023 at 9:46 AM

## 2023-03-16 ENCOUNTER — HOSPITAL ENCOUNTER (OUTPATIENT)
Dept: PREADMISSION TESTING | Age: 48
Discharge: HOME | End: 2023-03-16
Payer: COMMERCIAL

## 2023-03-16 VITALS
WEIGHT: 158 LBS | BODY MASS INDEX: 26.33 KG/M2 | TEMPERATURE: 97.9 F | DIASTOLIC BLOOD PRESSURE: 73 MMHG | RESPIRATION RATE: 18 BRPM | SYSTOLIC BLOOD PRESSURE: 113 MMHG | HEART RATE: 60 BPM | HEIGHT: 65 IN | OXYGEN SATURATION: 100 %

## 2023-03-16 LAB
ABO/RH: NORMAL
ANION GAP SERPL CALCULATED.3IONS-SCNC: 10 MMOL/L (ref 9–17)
ANTIBODY SCREEN: NEGATIVE
ARM BAND NUMBER: NORMAL
BUN SERPL-MCNC: 15 MG/DL (ref 6–20)
CHLORIDE SERPL-SCNC: 101 MMOL/L (ref 98–107)
CO2 SERPL-SCNC: 26 MMOL/L (ref 20–31)
CREAT SERPL-MCNC: 0.84 MG/DL (ref 0.7–1.2)
EXPIRATION DATE: NORMAL
GFR SERPL CREATININE-BSD FRML MDRD: >60 ML/MIN/1.73M2
GLUCOSE SERPL-MCNC: 106 MG/DL (ref 70–99)
HCT VFR BLD AUTO: 46.2 % (ref 40.7–50.3)
HGB BLD-MCNC: 15.4 G/DL (ref 13–17)
INR PPP: 1
MCH RBC QN AUTO: 29 PG (ref 25.2–33.5)
MCHC RBC AUTO-ENTMCNC: 33.3 G/DL (ref 28.4–34.8)
MCV RBC AUTO: 87 FL (ref 82.6–102.9)
NRBC AUTOMATED: 0 PER 100 WBC
PARTIAL THROMBOPLASTIN TIME: 26.2 SEC (ref 20.5–30.5)
PDW BLD-RTO: 14 % (ref 11.8–14.4)
PLATELET # BLD AUTO: 228 K/UL (ref 138–453)
PMV BLD AUTO: 10.3 FL (ref 8.1–13.5)
POTASSIUM SERPL-SCNC: 4.4 MMOL/L (ref 3.7–5.3)
PROTHROMBIN TIME: 10.8 SEC (ref 9.1–12.3)
RBC # BLD: 5.31 M/UL (ref 4.21–5.77)
SODIUM SERPL-SCNC: 137 MMOL/L (ref 135–144)
WBC # BLD AUTO: 7.3 K/UL (ref 3.5–11.3)

## 2023-03-16 PROCEDURE — 82565 ASSAY OF CREATININE: CPT

## 2023-03-16 PROCEDURE — 86900 BLOOD TYPING SEROLOGIC ABO: CPT

## 2023-03-16 PROCEDURE — 93005 ELECTROCARDIOGRAM TRACING: CPT | Performed by: ANESTHESIOLOGY

## 2023-03-16 PROCEDURE — 36415 COLL VENOUS BLD VENIPUNCTURE: CPT

## 2023-03-16 PROCEDURE — 85610 PROTHROMBIN TIME: CPT

## 2023-03-16 PROCEDURE — 80051 ELECTROLYTE PANEL: CPT

## 2023-03-16 PROCEDURE — 85730 THROMBOPLASTIN TIME PARTIAL: CPT

## 2023-03-16 PROCEDURE — 84520 ASSAY OF UREA NITROGEN: CPT

## 2023-03-16 PROCEDURE — 86850 RBC ANTIBODY SCREEN: CPT

## 2023-03-16 PROCEDURE — 82947 ASSAY GLUCOSE BLOOD QUANT: CPT

## 2023-03-16 PROCEDURE — 85027 COMPLETE CBC AUTOMATED: CPT

## 2023-03-16 PROCEDURE — 86901 BLOOD TYPING SEROLOGIC RH(D): CPT

## 2023-03-17 LAB
EKG ATRIAL RATE: 54 BPM
EKG P AXIS: 22 DEGREES
EKG P-R INTERVAL: 162 MS
EKG Q-T INTERVAL: 428 MS
EKG QRS DURATION: 106 MS
EKG QTC CALCULATION (BAZETT): 405 MS
EKG R AXIS: -17 DEGREES
EKG T AXIS: -9 DEGREES
EKG VENTRICULAR RATE: 54 BPM

## 2023-03-19 ENCOUNTER — HOSPITAL ENCOUNTER (EMERGENCY)
Age: 48
Discharge: HOME | End: 2023-03-19
Attending: EMERGENCY MEDICINE
Payer: COMMERCIAL

## 2023-03-19 VITALS
RESPIRATION RATE: 16 BRPM | TEMPERATURE: 98.1 F | WEIGHT: 158.8 LBS | OXYGEN SATURATION: 97 % | DIASTOLIC BLOOD PRESSURE: 81 MMHG | HEART RATE: 70 BPM | SYSTOLIC BLOOD PRESSURE: 139 MMHG | BODY MASS INDEX: 26.43 KG/M2

## 2023-03-19 DIAGNOSIS — R59.1 LYMPHADENOPATHY: Primary | ICD-10-CM

## 2023-03-19 LAB
ABSOLUTE EOS #: 0.1 K/UL (ref 0–0.4)
ABSOLUTE LYMPH #: 1.4 K/UL (ref 1–4.8)
ABSOLUTE MONO #: 1.1 K/UL (ref 0–1)
BASOPHILS # BLD: 0 % (ref 0–2)
BASOPHILS ABSOLUTE: 0 K/UL (ref 0–0.2)
DIFFERENTIAL TYPE: YES
EOSINOPHILS RELATIVE PERCENT: 2 % (ref 0–5)
HCT VFR BLD AUTO: 42.4 % (ref 41–53)
HGB BLD-MCNC: 14.1 G/DL (ref 13.5–17.5)
LYMPHOCYTES # BLD: 25 % (ref 13–44)
MCH RBC QN AUTO: 28.9 PG (ref 26–34)
MCHC RBC AUTO-ENTMCNC: 33.3 G/DL (ref 31–37)
MCV RBC AUTO: 86.7 FL (ref 80–100)
MONOCYTES # BLD: 19 % (ref 5–9)
PDW BLD-RTO: 14.3 % (ref 12.1–15.2)
PLATELET # BLD AUTO: 200 K/UL (ref 140–450)
RBC # BLD: 4.89 M/UL (ref 4.5–5.9)
SEG NEUTROPHILS: 54 % (ref 39–75)
SEGMENTED NEUTROPHILS ABSOLUTE COUNT: 2.9 K/UL (ref 2.1–6.5)
WBC # BLD AUTO: 5.4 K/UL (ref 3.5–11)

## 2023-03-19 PROCEDURE — 36415 COLL VENOUS BLD VENIPUNCTURE: CPT

## 2023-03-19 PROCEDURE — 99283 EMERGENCY DEPT VISIT LOW MDM: CPT

## 2023-03-19 PROCEDURE — 85025 COMPLETE CBC W/AUTO DIFF WBC: CPT

## 2023-03-19 RX ORDER — AMOXICILLIN 500 MG/1
500 CAPSULE ORAL 3 TIMES DAILY
Qty: 21 CAPSULE | Refills: 0 | Status: SHIPPED | OUTPATIENT
Start: 2023-03-19 | End: 2023-03-26

## 2023-03-19 ASSESSMENT — PAIN DESCRIPTION - PAIN TYPE: TYPE: ACUTE PAIN

## 2023-03-19 ASSESSMENT — ENCOUNTER SYMPTOMS
CHOKING: 0
COLOR CHANGE: 0

## 2023-03-19 ASSESSMENT — PAIN DESCRIPTION - LOCATION: LOCATION: HEAD

## 2023-03-19 ASSESSMENT — LIFESTYLE VARIABLES: HOW OFTEN DO YOU HAVE A DRINK CONTAINING ALCOHOL: NEVER

## 2023-03-19 ASSESSMENT — PAIN - FUNCTIONAL ASSESSMENT: PAIN_FUNCTIONAL_ASSESSMENT: 0-10

## 2023-03-19 ASSESSMENT — PAIN DESCRIPTION - ORIENTATION: ORIENTATION: LEFT;RIGHT

## 2023-03-19 NOTE — ED PROVIDER NOTES
Lymphadenopathy          DISPOSITION/PLAN   DISPOSITION Decision To Discharge 03/19/2023 08:01:18 PM      PATIENT REFERRED TO:  JUWAN White  [facility]  [phone]    In 2 days      DISCHARGE MEDICATIONS:  New Prescriptions    AMOXICILLIN (AMOXIL) 500 MG CAPSULE    Take 1 capsule by mouth 3 times daily for 7 days          (Please note that portions ofthis note were completed with a voice recognition program.  Efforts were made to edit the dictations but occasionally words are mis-transcribed.)    Clearance MD Luis Alberto(electronically signed)  Attending Emergency Physician            Clearance MD Luis Alberto  03/19/23 2002

## 2023-03-20 ENCOUNTER — ANESTHESIA EVENT (OUTPATIENT)
Dept: OPERATING ROOM | Age: 48
End: 2023-03-20
Payer: COMMERCIAL

## 2023-03-21 ENCOUNTER — TELEPHONE (OUTPATIENT)
Dept: NEUROLOGY | Age: 48
End: 2023-03-21

## 2023-03-21 NOTE — TELEPHONE ENCOUNTER
Patient family wants to know if tomorrows follow up appointment is necessary because patient surgery is scheduled 3.30.23.

## 2023-03-28 ENCOUNTER — ANESTHESIA (OUTPATIENT)
Dept: INTERVENTIONAL RADIOLOGY/VASCULAR | Age: 48
End: 2023-03-28

## 2023-03-28 ENCOUNTER — HOSPITAL ENCOUNTER (INPATIENT)
Dept: INTERVENTIONAL RADIOLOGY/VASCULAR | Age: 48
LOS: 10 days | Discharge: HOME | End: 2023-04-07
Attending: PSYCHIATRY & NEUROLOGY
Payer: COMMERCIAL

## 2023-03-28 ENCOUNTER — ANESTHESIA EVENT (OUTPATIENT)
Dept: INTERVENTIONAL RADIOLOGY/VASCULAR | Age: 48
End: 2023-03-28

## 2023-03-28 DIAGNOSIS — Z98.890 S/P BRAIN SURGERY: ICD-10-CM

## 2023-03-28 DIAGNOSIS — G93.6 CEREBRAL EDEMA (HCC): Primary | ICD-10-CM

## 2023-03-28 DIAGNOSIS — I72.9 ANEURYSM (HCC): ICD-10-CM

## 2023-03-28 DIAGNOSIS — Q27.30 AVM (ARTERIOVENOUS MALFORMATION): ICD-10-CM

## 2023-03-28 DIAGNOSIS — Q28.2 AVM (ARTERIOVENOUS MALFORMATION) BRAIN: ICD-10-CM

## 2023-03-28 LAB
ANION GAP SERPL CALCULATED.3IONS-SCNC: 9 MMOL/L (ref 9–17)
BUN SERPL-MCNC: 19 MG/DL (ref 6–20)
CALCIUM SERPL-MCNC: 9.4 MG/DL (ref 8.6–10.4)
CHLORIDE SERPL-SCNC: 107 MMOL/L (ref 98–107)
CO2 SERPL-SCNC: 26 MMOL/L (ref 20–31)
COLLAGEN ADENOSINE-5'-DIPHOSPHATE (ADP) TIME: 81 SEC (ref 67–112)
COLLAGEN EPINEPHRINE TIME: 83 SEC (ref 85–172)
CREAT SERPL-MCNC: 0.88 MG/DL (ref 0.7–1.2)
GFR SERPL CREATININE-BSD FRML MDRD: >60 ML/MIN/1.73M2
GLUCOSE BLD-MCNC: 122 MG/DL (ref 75–110)
GLUCOSE BLD-MCNC: 157 MG/DL (ref 75–110)
GLUCOSE BLD-MCNC: 168 MG/DL (ref 75–110)
GLUCOSE SERPL-MCNC: 115 MG/DL (ref 70–99)
HCT VFR BLD AUTO: 45.1 % (ref 40.7–50.3)
HGB BLD-MCNC: 15.1 G/DL (ref 13–17)
MCH RBC QN AUTO: 28.8 PG (ref 25.2–33.5)
MCHC RBC AUTO-ENTMCNC: 33.5 G/DL (ref 28.4–34.8)
MCV RBC AUTO: 86.1 FL (ref 82.6–102.9)
NRBC AUTOMATED: 0 PER 100 WBC
PDW BLD-RTO: 14.1 % (ref 11.8–14.4)
PLATELET # BLD AUTO: 209 K/UL (ref 138–453)
PLATELET FUNCTION INTERP: ABNORMAL
PMV BLD AUTO: 10 FL (ref 8.1–13.5)
POTASSIUM SERPL-SCNC: 4.2 MMOL/L (ref 3.7–5.3)
RBC # BLD: 5.24 M/UL (ref 4.21–5.77)
SODIUM SERPL-SCNC: 142 MMOL/L (ref 135–144)
WBC # BLD AUTO: 8.3 K/UL (ref 3.5–11.3)

## 2023-03-28 PROCEDURE — C1894 INTRO/SHEATH, NON-LASER: HCPCS

## 2023-03-28 PROCEDURE — 3700000000 HC ANESTHESIA ATTENDED CARE

## 2023-03-28 PROCEDURE — 2580000003 HC RX 258: Performed by: ANESTHESIOLOGY

## 2023-03-28 PROCEDURE — C1887 CATHETER, GUIDING: HCPCS

## 2023-03-28 PROCEDURE — 6370000000 HC RX 637 (ALT 250 FOR IP): Performed by: STUDENT IN AN ORGANIZED HEALTH CARE EDUCATION/TRAINING PROGRAM

## 2023-03-28 PROCEDURE — 36227 PLACE CATH XTRNL CAROTID: CPT

## 2023-03-28 PROCEDURE — 3700000001 HC ADD 15 MINUTES (ANESTHESIA)

## 2023-03-28 PROCEDURE — 2500000003 HC RX 250 WO HCPCS

## 2023-03-28 PROCEDURE — 2500000003 HC RX 250 WO HCPCS: Performed by: PSYCHIATRY & NEUROLOGY

## 2023-03-28 PROCEDURE — C1769 GUIDE WIRE: HCPCS

## 2023-03-28 PROCEDURE — 61624 TCAT PERM OCCLS/EMBOLJ CNS: CPT

## 2023-03-28 PROCEDURE — 6360000002 HC RX W HCPCS: Performed by: STUDENT IN AN ORGANIZED HEALTH CARE EDUCATION/TRAINING PROGRAM

## 2023-03-28 PROCEDURE — 6360000004 HC RX CONTRAST MEDICATION: Performed by: PSYCHIATRY & NEUROLOGY

## 2023-03-28 PROCEDURE — 2580000003 HC RX 258

## 2023-03-28 PROCEDURE — 80048 BASIC METABOLIC PNL TOTAL CA: CPT

## 2023-03-28 PROCEDURE — 2580000003 HC RX 258: Performed by: STUDENT IN AN ORGANIZED HEALTH CARE EDUCATION/TRAINING PROGRAM

## 2023-03-28 PROCEDURE — C1889 IMPLANT/INSERT DEVICE, NOC: HCPCS

## 2023-03-28 PROCEDURE — 7100000000 HC PACU RECOVERY - FIRST 15 MIN

## 2023-03-28 PROCEDURE — 85576 BLOOD PLATELET AGGREGATION: CPT

## 2023-03-28 PROCEDURE — 36224 PLACE CATH CAROTD ART: CPT

## 2023-03-28 PROCEDURE — 6370000000 HC RX 637 (ALT 250 FOR IP): Performed by: PSYCHIATRY & NEUROLOGY

## 2023-03-28 PROCEDURE — 6360000002 HC RX W HCPCS: Performed by: ANESTHESIOLOGY

## 2023-03-28 PROCEDURE — 75898 FOLLOW-UP ANGIOGRAPHY: CPT

## 2023-03-28 PROCEDURE — 6360000002 HC RX W HCPCS

## 2023-03-28 PROCEDURE — 82947 ASSAY GLUCOSE BLOOD QUANT: CPT

## 2023-03-28 PROCEDURE — 2000000000 HC ICU R&B

## 2023-03-28 PROCEDURE — 36228 PLACE CATH INTRACRANIAL ART: CPT

## 2023-03-28 PROCEDURE — 75894 X-RAYS TRANSCATH THERAPY: CPT

## 2023-03-28 PROCEDURE — 99223 1ST HOSP IP/OBS HIGH 75: CPT | Performed by: PSYCHIATRY & NEUROLOGY

## 2023-03-28 PROCEDURE — C1760 CLOSURE DEV, VASC: HCPCS

## 2023-03-28 PROCEDURE — 7100000001 HC PACU RECOVERY - ADDTL 15 MIN

## 2023-03-28 PROCEDURE — 85027 COMPLETE CBC AUTOMATED: CPT

## 2023-03-28 PROCEDURE — 2580000003 HC RX 258: Performed by: PSYCHIATRY & NEUROLOGY

## 2023-03-28 PROCEDURE — C2628 CATHETER, OCCLUSION: HCPCS

## 2023-03-28 PROCEDURE — 2709999900 HC NON-CHARGEABLE SUPPLY

## 2023-03-28 RX ORDER — DROPERIDOL 2.5 MG/ML
0.62 INJECTION, SOLUTION INTRAMUSCULAR; INTRAVENOUS
Status: DISCONTINUED | OUTPATIENT
Start: 2023-03-28 | End: 2023-03-29

## 2023-03-28 RX ORDER — ONDANSETRON 2 MG/ML
INJECTION INTRAMUSCULAR; INTRAVENOUS PRN
Status: DISCONTINUED | OUTPATIENT
Start: 2023-03-28 | End: 2023-03-28 | Stop reason: SDUPTHER

## 2023-03-28 RX ORDER — LEVETIRACETAM 10 MG/ML
1000 INJECTION INTRAVASCULAR ONCE
Status: COMPLETED | OUTPATIENT
Start: 2023-03-28 | End: 2023-03-28

## 2023-03-28 RX ORDER — SODIUM CHLORIDE 9 MG/ML
25 INJECTION, SOLUTION INTRAVENOUS PRN
Status: DISCONTINUED | OUTPATIENT
Start: 2023-03-28 | End: 2023-03-31 | Stop reason: HOSPADM

## 2023-03-28 RX ORDER — MIDAZOLAM HYDROCHLORIDE 1 MG/ML
INJECTION INTRAMUSCULAR; INTRAVENOUS PRN
Status: DISCONTINUED | OUTPATIENT
Start: 2023-03-28 | End: 2023-03-28 | Stop reason: SDUPTHER

## 2023-03-28 RX ORDER — LIDOCAINE HYDROCHLORIDE 10 MG/ML
INJECTION, SOLUTION EPIDURAL; INFILTRATION; INTRACAUDAL; PERINEURAL PRN
Status: DISCONTINUED | OUTPATIENT
Start: 2023-03-28 | End: 2023-03-28 | Stop reason: SDUPTHER

## 2023-03-28 RX ORDER — ONDANSETRON 4 MG/1
4 TABLET, ORALLY DISINTEGRATING ORAL EVERY 8 HOURS PRN
Status: DISCONTINUED | OUTPATIENT
Start: 2023-03-28 | End: 2023-03-28 | Stop reason: SDUPTHER

## 2023-03-28 RX ORDER — SODIUM CHLORIDE, SODIUM LACTATE, POTASSIUM CHLORIDE, CALCIUM CHLORIDE 600; 310; 30; 20 MG/100ML; MG/100ML; MG/100ML; MG/100ML
1000 INJECTION, SOLUTION INTRAVENOUS CONTINUOUS
Status: DISCONTINUED | OUTPATIENT
Start: 2023-03-28 | End: 2023-03-29

## 2023-03-28 RX ORDER — POLYETHYLENE GLYCOL 3350 17 G/17G
17 POWDER, FOR SOLUTION ORAL DAILY PRN
Status: DISCONTINUED | OUTPATIENT
Start: 2023-03-28 | End: 2023-04-07 | Stop reason: HOSPADM

## 2023-03-28 RX ORDER — SODIUM CHLORIDE 0.9 % (FLUSH) 0.9 %
5-40 SYRINGE (ML) INJECTION EVERY 12 HOURS SCHEDULED
Status: DISCONTINUED | OUTPATIENT
Start: 2023-03-28 | End: 2023-03-31 | Stop reason: HOSPADM

## 2023-03-28 RX ORDER — DIPHENHYDRAMINE HYDROCHLORIDE 50 MG/ML
12.5 INJECTION INTRAMUSCULAR; INTRAVENOUS
Status: DISCONTINUED | OUTPATIENT
Start: 2023-03-28 | End: 2023-03-29

## 2023-03-28 RX ORDER — SODIUM CHLORIDE 0.9 % (FLUSH) 0.9 %
5-40 SYRINGE (ML) INJECTION EVERY 12 HOURS SCHEDULED
Status: DISCONTINUED | OUTPATIENT
Start: 2023-03-28 | End: 2023-03-28 | Stop reason: SDUPTHER

## 2023-03-28 RX ORDER — ONDANSETRON 2 MG/ML
4 INJECTION INTRAMUSCULAR; INTRAVENOUS EVERY 6 HOURS PRN
Status: DISCONTINUED | OUTPATIENT
Start: 2023-03-28 | End: 2023-03-28 | Stop reason: SDUPTHER

## 2023-03-28 RX ORDER — SODIUM CHLORIDE 9 MG/ML
INJECTION, SOLUTION INTRAVENOUS PRN
Status: DISCONTINUED | OUTPATIENT
Start: 2023-03-28 | End: 2023-04-07 | Stop reason: HOSPADM

## 2023-03-28 RX ORDER — HEPARIN SODIUM 1000 [USP'U]/ML
INJECTION, SOLUTION INTRAVENOUS; SUBCUTANEOUS PRN
Status: DISCONTINUED | OUTPATIENT
Start: 2023-03-28 | End: 2023-03-28 | Stop reason: SDUPTHER

## 2023-03-28 RX ORDER — LEVETIRACETAM 500 MG/1
500 TABLET ORAL 2 TIMES DAILY
Status: DISCONTINUED | OUTPATIENT
Start: 2023-03-28 | End: 2023-03-31

## 2023-03-28 RX ORDER — SODIUM CHLORIDE 0.9 % (FLUSH) 0.9 %
5-40 SYRINGE (ML) INJECTION PRN
Status: DISCONTINUED | OUTPATIENT
Start: 2023-03-28 | End: 2023-03-28 | Stop reason: SDUPTHER

## 2023-03-28 RX ORDER — SODIUM CHLORIDE 9 MG/ML
INJECTION, SOLUTION INTRAVENOUS PRN
Status: DISCONTINUED | OUTPATIENT
Start: 2023-03-28 | End: 2023-03-28 | Stop reason: SDUPTHER

## 2023-03-28 RX ORDER — DEXAMETHASONE 4 MG/1
4 TABLET ORAL EVERY 8 HOURS SCHEDULED
Status: DISCONTINUED | OUTPATIENT
Start: 2023-03-28 | End: 2023-03-31

## 2023-03-28 RX ORDER — CEFAZOLIN SODIUM 1 G/3ML
INJECTION, POWDER, FOR SOLUTION INTRAMUSCULAR; INTRAVENOUS PRN
Status: DISCONTINUED | OUTPATIENT
Start: 2023-03-28 | End: 2023-03-28 | Stop reason: SDUPTHER

## 2023-03-28 RX ORDER — ACETAMINOPHEN 325 MG/1
650 TABLET ORAL EVERY 6 HOURS PRN
Status: DISCONTINUED | OUTPATIENT
Start: 2023-03-28 | End: 2023-04-07 | Stop reason: HOSPADM

## 2023-03-28 RX ORDER — PROPOFOL 10 MG/ML
INJECTION, EMULSION INTRAVENOUS PRN
Status: DISCONTINUED | OUTPATIENT
Start: 2023-03-28 | End: 2023-03-28 | Stop reason: SDUPTHER

## 2023-03-28 RX ORDER — TOPIRAMATE 25 MG/1
50 TABLET ORAL ONCE
Status: COMPLETED | OUTPATIENT
Start: 2023-03-28 | End: 2023-03-28

## 2023-03-28 RX ORDER — DEXAMETHASONE SODIUM PHOSPHATE 10 MG/ML
INJECTION, SOLUTION INTRAMUSCULAR; INTRAVENOUS PRN
Status: DISCONTINUED | OUTPATIENT
Start: 2023-03-28 | End: 2023-03-28 | Stop reason: SDUPTHER

## 2023-03-28 RX ORDER — SODIUM CHLORIDE 0.9 % (FLUSH) 0.9 %
5-40 SYRINGE (ML) INJECTION PRN
Status: DISCONTINUED | OUTPATIENT
Start: 2023-03-28 | End: 2023-04-07 | Stop reason: HOSPADM

## 2023-03-28 RX ORDER — METOCLOPRAMIDE HYDROCHLORIDE 5 MG/ML
10 INJECTION INTRAMUSCULAR; INTRAVENOUS
Status: DISCONTINUED | OUTPATIENT
Start: 2023-03-28 | End: 2023-03-29

## 2023-03-28 RX ORDER — OXYCODONE HYDROCHLORIDE 5 MG/1
5 TABLET ORAL EVERY 4 HOURS PRN
Status: DISCONTINUED | OUTPATIENT
Start: 2023-03-28 | End: 2023-04-07 | Stop reason: HOSPADM

## 2023-03-28 RX ORDER — HYDRALAZINE HYDROCHLORIDE 20 MG/ML
10 INJECTION INTRAMUSCULAR; INTRAVENOUS
Status: DISCONTINUED | OUTPATIENT
Start: 2023-03-28 | End: 2023-03-29

## 2023-03-28 RX ORDER — SODIUM CHLORIDE 9 MG/ML
INJECTION, SOLUTION INTRAVENOUS CONTINUOUS
Status: DISCONTINUED | OUTPATIENT
Start: 2023-03-28 | End: 2023-04-07 | Stop reason: HOSPADM

## 2023-03-28 RX ORDER — IODIXANOL 320 MG/ML
100 INJECTION, SOLUTION INTRAVASCULAR
Status: COMPLETED | OUTPATIENT
Start: 2023-03-28 | End: 2023-03-28

## 2023-03-28 RX ORDER — SODIUM CHLORIDE 0.9 % (FLUSH) 0.9 %
5-40 SYRINGE (ML) INJECTION PRN
Status: DISCONTINUED | OUTPATIENT
Start: 2023-03-28 | End: 2023-03-31 | Stop reason: HOSPADM

## 2023-03-28 RX ORDER — FENTANYL CITRATE 50 UG/ML
INJECTION, SOLUTION INTRAMUSCULAR; INTRAVENOUS PRN
Status: DISCONTINUED | OUTPATIENT
Start: 2023-03-28 | End: 2023-03-28 | Stop reason: SDUPTHER

## 2023-03-28 RX ORDER — ACETAMINOPHEN 650 MG/1
650 SUPPOSITORY RECTAL EVERY 6 HOURS PRN
Status: DISCONTINUED | OUTPATIENT
Start: 2023-03-28 | End: 2023-04-07 | Stop reason: HOSPADM

## 2023-03-28 RX ORDER — ROCURONIUM BROMIDE 10 MG/ML
INJECTION, SOLUTION INTRAVENOUS PRN
Status: DISCONTINUED | OUTPATIENT
Start: 2023-03-28 | End: 2023-03-28 | Stop reason: SDUPTHER

## 2023-03-28 RX ORDER — MAGNESIUM SULFATE IN WATER 40 MG/ML
2000 INJECTION, SOLUTION INTRAVENOUS ONCE
Status: COMPLETED | OUTPATIENT
Start: 2023-03-28 | End: 2023-03-29

## 2023-03-28 RX ORDER — ONDANSETRON 2 MG/ML
4 INJECTION INTRAMUSCULAR; INTRAVENOUS EVERY 6 HOURS PRN
Status: DISCONTINUED | OUTPATIENT
Start: 2023-03-28 | End: 2023-04-07 | Stop reason: HOSPADM

## 2023-03-28 RX ORDER — EPHEDRINE SULFATE/0.9% NACL/PF 50 MG/5 ML
SYRINGE (ML) INTRAVENOUS PRN
Status: DISCONTINUED | OUTPATIENT
Start: 2023-03-28 | End: 2023-03-28 | Stop reason: SDUPTHER

## 2023-03-28 RX ORDER — ACETAMINOPHEN 325 MG/1
650 TABLET ORAL EVERY 4 HOURS PRN
Status: DISCONTINUED | OUTPATIENT
Start: 2023-03-28 | End: 2023-03-28 | Stop reason: SDUPTHER

## 2023-03-28 RX ORDER — SODIUM CHLORIDE, SODIUM LACTATE, POTASSIUM CHLORIDE, CALCIUM CHLORIDE 600; 310; 30; 20 MG/100ML; MG/100ML; MG/100ML; MG/100ML
INJECTION, SOLUTION INTRAVENOUS CONTINUOUS PRN
Status: DISCONTINUED | OUTPATIENT
Start: 2023-03-28 | End: 2023-03-28 | Stop reason: SDUPTHER

## 2023-03-28 RX ORDER — ONDANSETRON 4 MG/1
4 TABLET, ORALLY DISINTEGRATING ORAL EVERY 8 HOURS PRN
Status: DISCONTINUED | OUTPATIENT
Start: 2023-03-28 | End: 2023-04-07 | Stop reason: HOSPADM

## 2023-03-28 RX ORDER — LABETALOL HYDROCHLORIDE 5 MG/ML
INJECTION, SOLUTION INTRAVENOUS PRN
Status: DISCONTINUED | OUTPATIENT
Start: 2023-03-28 | End: 2023-03-28 | Stop reason: SDUPTHER

## 2023-03-28 RX ORDER — SODIUM CHLORIDE 0.9 % (FLUSH) 0.9 %
5-40 SYRINGE (ML) INJECTION EVERY 12 HOURS SCHEDULED
Status: DISCONTINUED | OUTPATIENT
Start: 2023-03-28 | End: 2023-04-07 | Stop reason: HOSPADM

## 2023-03-28 RX ADMIN — ROCURONIUM BROMIDE 10 MG: 10 INJECTION, SOLUTION INTRAVENOUS at 10:27

## 2023-03-28 RX ADMIN — PROPOFOL 50 MG: 10 INJECTION, EMULSION INTRAVENOUS at 12:53

## 2023-03-28 RX ADMIN — PROPOFOL 150 MG: 10 INJECTION, EMULSION INTRAVENOUS at 09:02

## 2023-03-28 RX ADMIN — LEVETIRACETAM 500 MG: 500 TABLET, FILM COATED ORAL at 20:43

## 2023-03-28 RX ADMIN — ROCURONIUM BROMIDE 10 MG: 10 INJECTION, SOLUTION INTRAVENOUS at 09:33

## 2023-03-28 RX ADMIN — ROCURONIUM BROMIDE 10 MG: 10 INJECTION, SOLUTION INTRAVENOUS at 09:57

## 2023-03-28 RX ADMIN — FENTANYL CITRATE 50 MCG: 50 INJECTION, SOLUTION INTRAMUSCULAR; INTRAVENOUS at 12:59

## 2023-03-28 RX ADMIN — CEFAZOLIN 2 G: 1 INJECTION, POWDER, FOR SOLUTION INTRAMUSCULAR; INTRAVENOUS at 13:27

## 2023-03-28 RX ADMIN — SODIUM CHLORIDE, PRESERVATIVE FREE 10 ML: 5 INJECTION INTRAVENOUS at 21:25

## 2023-03-28 RX ADMIN — PHENYLEPHRINE HYDROCHLORIDE 100 MCG: 10 INJECTION INTRAVENOUS at 09:59

## 2023-03-28 RX ADMIN — ROCURONIUM BROMIDE 10 MG: 10 INJECTION, SOLUTION INTRAVENOUS at 11:22

## 2023-03-28 RX ADMIN — ROCURONIUM BROMIDE 50 MG: 10 INJECTION, SOLUTION INTRAVENOUS at 09:02

## 2023-03-28 RX ADMIN — Medication 5 MG: at 11:55

## 2023-03-28 RX ADMIN — Medication 5 MG: at 11:43

## 2023-03-28 RX ADMIN — FENTANYL CITRATE 50 MCG: 50 INJECTION, SOLUTION INTRAMUSCULAR; INTRAVENOUS at 12:56

## 2023-03-28 RX ADMIN — SODIUM CHLORIDE: 9 INJECTION, SOLUTION INTRAVENOUS at 16:54

## 2023-03-28 RX ADMIN — Medication 5 MG: at 13:15

## 2023-03-28 RX ADMIN — HEPARIN SODIUM 2000 UNITS: 1000 INJECTION INTRAVENOUS; SUBCUTANEOUS at 09:43

## 2023-03-28 RX ADMIN — Medication 5 MG: at 10:28

## 2023-03-28 RX ADMIN — Medication 5 MG: at 10:50

## 2023-03-28 RX ADMIN — ROCURONIUM BROMIDE 10 MG: 10 INJECTION, SOLUTION INTRAVENOUS at 14:40

## 2023-03-28 RX ADMIN — SODIUM CHLORIDE, POTASSIUM CHLORIDE, SODIUM LACTATE AND CALCIUM CHLORIDE: 600; 310; 30; 20 INJECTION, SOLUTION INTRAVENOUS at 14:10

## 2023-03-28 RX ADMIN — MIDAZOLAM 2 MG: 1 INJECTION INTRAMUSCULAR; INTRAVENOUS at 08:59

## 2023-03-28 RX ADMIN — PHENYLEPHRINE HYDROCHLORIDE 100 MCG: 10 INJECTION INTRAVENOUS at 09:38

## 2023-03-28 RX ADMIN — PHENYLEPHRINE HYDROCHLORIDE 25 MCG/MIN: 10 INJECTION INTRAVENOUS at 09:52

## 2023-03-28 RX ADMIN — Medication 5 MG: at 15:11

## 2023-03-28 RX ADMIN — SODIUM CHLORIDE 5 MG/HR: 9 INJECTION, SOLUTION INTRAVENOUS at 20:55

## 2023-03-28 RX ADMIN — LEVETIRACETAM 1000 MG: 10 INJECTION INTRAVASCULAR at 13:39

## 2023-03-28 RX ADMIN — SODIUM CHLORIDE, POTASSIUM CHLORIDE, SODIUM LACTATE AND CALCIUM CHLORIDE: 600; 310; 30; 20 INJECTION, SOLUTION INTRAVENOUS at 09:38

## 2023-03-28 RX ADMIN — ROCURONIUM BROMIDE 10 MG: 10 INJECTION, SOLUTION INTRAVENOUS at 13:24

## 2023-03-28 RX ADMIN — ACETAMINOPHEN 650 MG: 325 TABLET ORAL at 19:30

## 2023-03-28 RX ADMIN — MAGNESIUM SULFATE HEPTAHYDRATE 2000 MG: 40 INJECTION, SOLUTION INTRAVENOUS at 21:03

## 2023-03-28 RX ADMIN — SODIUM CHLORIDE, POTASSIUM CHLORIDE, SODIUM LACTATE AND CALCIUM CHLORIDE: 600; 310; 30; 20 INJECTION, SOLUTION INTRAVENOUS at 09:06

## 2023-03-28 RX ADMIN — SUGAMMADEX 200 MG: 100 INJECTION, SOLUTION INTRAVENOUS at 15:03

## 2023-03-28 RX ADMIN — SODIUM CHLORIDE, POTASSIUM CHLORIDE, SODIUM LACTATE AND CALCIUM CHLORIDE: 600; 310; 30; 20 INJECTION, SOLUTION INTRAVENOUS at 11:23

## 2023-03-28 RX ADMIN — LIDOCAINE HYDROCHLORIDE 50 MG: 10 INJECTION, SOLUTION EPIDURAL; INFILTRATION; INTRACAUDAL; PERINEURAL at 09:02

## 2023-03-28 RX ADMIN — Medication 5 MG: at 15:28

## 2023-03-28 RX ADMIN — IODIXANOL 134 ML: 320 INJECTION, SOLUTION INTRAVASCULAR at 15:06

## 2023-03-28 RX ADMIN — ROCURONIUM BROMIDE 20 MG: 10 INJECTION, SOLUTION INTRAVENOUS at 12:56

## 2023-03-28 RX ADMIN — ROCURONIUM BROMIDE 10 MG: 10 INJECTION, SOLUTION INTRAVENOUS at 13:58

## 2023-03-28 RX ADMIN — SODIUM CHLORIDE, POTASSIUM CHLORIDE, SODIUM LACTATE AND CALCIUM CHLORIDE 1000 ML: 600; 310; 30; 20 INJECTION, SOLUTION INTRAVENOUS at 07:50

## 2023-03-28 RX ADMIN — ONDANSETRON 4 MG: 2 INJECTION INTRAMUSCULAR; INTRAVENOUS at 14:55

## 2023-03-28 RX ADMIN — Medication 10 MG: at 10:05

## 2023-03-28 RX ADMIN — Medication 5 MG: at 15:25

## 2023-03-28 RX ADMIN — DEXAMETHASONE SODIUM PHOSPHATE 10 MG: 10 INJECTION, SOLUTION INTRAMUSCULAR; INTRAVENOUS at 09:20

## 2023-03-28 RX ADMIN — ROCURONIUM BROMIDE 10 MG: 10 INJECTION, SOLUTION INTRAVENOUS at 10:58

## 2023-03-28 RX ADMIN — PROPOFOL 50 MG: 10 INJECTION, EMULSION INTRAVENOUS at 13:02

## 2023-03-28 RX ADMIN — FENTANYL CITRATE 100 MCG: 50 INJECTION, SOLUTION INTRAMUSCULAR; INTRAVENOUS at 09:02

## 2023-03-28 RX ADMIN — Medication 2 G: at 09:31

## 2023-03-28 RX ADMIN — DEXAMETHASONE 4 MG: 4 TABLET ORAL at 20:43

## 2023-03-28 RX ADMIN — TOPIRAMATE 50 MG: 25 TABLET, FILM COATED ORAL at 21:23

## 2023-03-28 RX ADMIN — HYDRALAZINE HYDROCHLORIDE 10 MG: 20 INJECTION INTRAMUSCULAR; INTRAVENOUS at 20:38

## 2023-03-28 ASSESSMENT — PAIN - FUNCTIONAL ASSESSMENT: PAIN_FUNCTIONAL_ASSESSMENT: NONE - DENIES PAIN

## 2023-03-28 ASSESSMENT — PAIN SCALES - GENERAL
PAINLEVEL_OUTOF10: 2
PAINLEVEL_OUTOF10: 3
PAINLEVEL_OUTOF10: 7

## 2023-03-28 ASSESSMENT — PAIN DESCRIPTION - LOCATION: LOCATION: HEAD

## 2023-03-28 NOTE — ANESTHESIA PRE PROCEDURE
BEART, [id number]     Type & Screen (If Applicable):  No results found for: LABABO, LABRH    Drug/Infectious Status (If Applicable):  No results found for: HIV, HEPCAB    COVID-19 Screening (If Applicable): No results found for: COVID19        Anesthesia Evaluation  Patient summary reviewed and Nursing notes reviewed no history of anesthetic complications:   Airway: Mallampati: III  TM distance: >3 FB   Neck ROM: full  Mouth opening: > = 3 FB   Dental: normal exam         Pulmonary:Negative Pulmonary ROS and normal exam                               Cardiovascular:Negative CV ROS                      Neuro/Psych:   (+) headaches: migraine headaches,             GI/Hepatic/Renal:   (+) renal disease: kidney stones,           Endo/Other:                     Abdominal:             Vascular: Other Findings:           Anesthesia Plan      general and MAC     ASA 2       Induction: intravenous. arterial line  MIPS: Postoperative opioids intended and Prophylactic antiemetics administered. Anesthetic plan and risks discussed with patient. Plan discussed with CRNA.                     Derek Valadez MD   3/28/2023

## 2023-03-28 NOTE — SEDATION DOCUMENTATION
CLOSURE TIME:    Vascade deployed Rt puncture site without complication  Manual pressure held by Dr. Lakisha Chase  No obvious hematoma or bleeding noted

## 2023-03-28 NOTE — H&P
mg, Oral, Q8H PRN **OR** ondansetron (ZOFRAN) injection 4 mg, 4 mg, IntraVENous, Q6H PRN  sodium chloride flush 0.9 % injection 5-40 mL, 5-40 mL, IntraVENous, 2 times per day  sodium chloride flush 0.9 % injection 5-40 mL, 5-40 mL, IntraVENous, PRN  0.9 % sodium chloride infusion, , IntraVENous, PRN  niCARdipine (CARDENE) 25 mg in sodium chloride 0.9 % 250 mL infusion, 2.5-15 mg/hr, IntraVENous, Continuous    REVIEW OF SYSTEMS     CONSTITUTIONAL: negative for fatigue and malaise   EYES: negative for double vision and photophobia    HEENT: negative for tinnitus and sore throat   RESPIRATORY: negative for cough, shortness of breath   CARDIOVASCULAR: negative for chest pain, palpitations, or syncope   GASTROINTESTINAL: negative for abdominal pain, nausea, vomiting, diarrhea, or constipation    GENITOURINARY: negative for incontinence or retention    MUSCULOSKELETAL: negative for neck or back pain, negative for extremity pain   NEUROLOGICAL: Negative for seizures, headaches, weakness, numbness, confusion, aphasia, dysarthria    PSYCHIATRIC: negative for agitation, hallucination, SI/HI   SKIN Negative for spontaneous contusions, rashes, or lesions      PHYSICAL EXAM:     /65   Pulse 96   Temp 97.7 °F (36.5 °C) (Temporal)   Resp 20   Ht 5' 5\" (1.651 m)   Wt 158 lb (71.7 kg)   SpO2 97%   BMI 26.29 kg/m²     PHYSICAL EXAM:  CONSTITUTIONAL:  Well developed, well nourished, alert and oriented x 3, in no acute distress. GCS 15. Nontoxic. No dysarthria. No aphasia.    HEAD:  normocephalic, atraumatic    EYES:  PERRLA, EOMI.   ENT:  moist mucous membranes   LUNGS:  Equal air entry bilaterally   CARDIOVASCULAR:  normal s1 / s2   ABDOMEN:  Soft, no rigidity   NECK supple, symmetric, no midline tenderness to palpation    BACK without midline tenderness, step-offs or deformities    EXTREMITIES Normal ROM with no deformities   NEUROLOGIC:  Mental Status:  A & O x3,awake             Cranial Nerves:    cranial nerves II-XII
Comprehensive Stroke Karsten [id number]  Electronically signed 3/28/2023 at 8:41 AM

## 2023-03-28 NOTE — SEDATION DOCUMENTATION
Pt extubated without incident and breathing effectively on own. Face symmetric. Pupils PERRLA @ 3mm. Pt moving all extremities. Still under anesthesia so does not follow commands or answer questions at this time.

## 2023-03-28 NOTE — SEDATION DOCUMENTATION
Pre procedure assessment:  Pt awake/ alert/ oriented x 3. No obvious distress or discomfort, none endorsed. Resp even/ non labored. Skin pink, warm, dry, cap refill < 3 sec. Pt moves all extremities x 4. Good pulse, motor, sensory x4. Face symmetric. Speech clear and appropriate (pt Estonian speaking primarily). Pupils PERRLA @ 3 mm. No obvious visual deficits.

## 2023-03-29 ENCOUNTER — APPOINTMENT (OUTPATIENT)
Dept: CT IMAGING | Age: 48
End: 2023-03-29
Attending: PSYCHIATRY & NEUROLOGY
Payer: COMMERCIAL

## 2023-03-29 ENCOUNTER — APPOINTMENT (OUTPATIENT)
Dept: MRI IMAGING | Age: 48
End: 2023-03-29
Attending: PSYCHIATRY & NEUROLOGY
Payer: COMMERCIAL

## 2023-03-29 LAB
ABSOLUTE EOS #: <0.03 K/UL (ref 0–0.44)
ABSOLUTE IMMATURE GRANULOCYTE: 0.14 K/UL (ref 0–0.3)
ABSOLUTE LYMPH #: 1.13 K/UL (ref 1.1–3.7)
ABSOLUTE MONO #: 0.7 K/UL (ref 0.1–1.2)
ANION GAP SERPL CALCULATED.3IONS-SCNC: 9 MMOL/L (ref 9–17)
BASOPHILS # BLD: 0 % (ref 0–2)
BASOPHILS ABSOLUTE: <0.03 K/UL (ref 0–0.2)
BILIRUBIN URINE: NEGATIVE
BUN SERPL-MCNC: 18 MG/DL (ref 6–20)
CALCIUM SERPL-MCNC: 8.6 MG/DL (ref 8.6–10.4)
CASTS UA: NORMAL /LPF (ref 0–8)
CHLORIDE SERPL-SCNC: 108 MMOL/L (ref 98–107)
CO2 SERPL-SCNC: 22 MMOL/L (ref 20–31)
COLOR: YELLOW
CREAT SERPL-MCNC: 0.82 MG/DL (ref 0.7–1.2)
EOSINOPHILS RELATIVE PERCENT: 0 % (ref 1–4)
EPITHELIAL CELLS UA: NORMAL /HPF (ref 0–5)
GFR SERPL CREATININE-BSD FRML MDRD: >60 ML/MIN/1.73M2
GLUCOSE BLD-MCNC: 145 MG/DL (ref 75–110)
GLUCOSE BLD-MCNC: 151 MG/DL (ref 75–110)
GLUCOSE BLD-MCNC: 159 MG/DL (ref 75–110)
GLUCOSE SERPL-MCNC: 147 MG/DL (ref 70–99)
GLUCOSE UR STRIP.AUTO-MCNC: NEGATIVE MG/DL
HCT VFR BLD AUTO: 37 % (ref 40.7–50.3)
HGB BLD-MCNC: 12.6 G/DL (ref 13–17)
IMMATURE GRANULOCYTES: 1 %
KETONES UR STRIP.AUTO-MCNC: NEGATIVE MG/DL
LEUKOCYTE ESTERASE UR QL STRIP.AUTO: NEGATIVE
LYMPHOCYTES # BLD: 8 % (ref 24–43)
MCH RBC QN AUTO: 29 PG (ref 25.2–33.5)
MCHC RBC AUTO-ENTMCNC: 34.1 G/DL (ref 28.4–34.8)
MCV RBC AUTO: 85.1 FL (ref 82.6–102.9)
MONOCYTES # BLD: 5 % (ref 3–12)
NITRITE UR QL STRIP.AUTO: NEGATIVE
NRBC AUTOMATED: 0 PER 100 WBC
PDW BLD-RTO: 14.3 % (ref 11.8–14.4)
PLATELET # BLD AUTO: 208 K/UL (ref 138–453)
PMV BLD AUTO: 10.3 FL (ref 8.1–13.5)
POTASSIUM SERPL-SCNC: 4 MMOL/L (ref 3.7–5.3)
PROT UR STRIP.AUTO-MCNC: 7.5 MG/DL (ref 5–8)
PROT UR STRIP.AUTO-MCNC: NEGATIVE MG/DL
RBC # BLD: 4.35 M/UL (ref 4.21–5.77)
RBC CLUMPS #/AREA URNS AUTO: NORMAL /HPF (ref 0–4)
SEG NEUTROPHILS: 87 % (ref 36–65)
SEGMENTED NEUTROPHILS ABSOLUTE COUNT: 12.9 K/UL (ref 1.5–8.1)
SODIUM SERPL-SCNC: 139 MMOL/L (ref 135–144)
SPECIFIC GRAVITY UA: 1.01 (ref 1–1.03)
TURBIDITY: CLEAR
URINE HGB: NEGATIVE
UROBILINOGEN, URINE: NORMAL
WBC # BLD AUTO: 14.9 K/UL (ref 3.5–11.3)
WBC UA: NORMAL /HPF (ref 0–5)

## 2023-03-29 PROCEDURE — 2580000003 HC RX 258: Performed by: ANESTHESIOLOGY

## 2023-03-29 PROCEDURE — 6360000002 HC RX W HCPCS: Performed by: REGISTERED NURSE

## 2023-03-29 PROCEDURE — 6360000004 HC RX CONTRAST MEDICATION: Performed by: NURSE PRACTITIONER

## 2023-03-29 PROCEDURE — 82947 ASSAY GLUCOSE BLOOD QUANT: CPT

## 2023-03-29 PROCEDURE — 6360000002 HC RX W HCPCS: Performed by: STUDENT IN AN ORGANIZED HEALTH CARE EDUCATION/TRAINING PROGRAM

## 2023-03-29 PROCEDURE — 70553 MRI BRAIN STEM W/O & W/DYE: CPT

## 2023-03-29 PROCEDURE — 2580000003 HC RX 258: Performed by: STUDENT IN AN ORGANIZED HEALTH CARE EDUCATION/TRAINING PROGRAM

## 2023-03-29 PROCEDURE — 6370000000 HC RX 637 (ALT 250 FOR IP): Performed by: PSYCHIATRY & NEUROLOGY

## 2023-03-29 PROCEDURE — 70450 CT HEAD/BRAIN W/O DYE: CPT

## 2023-03-29 PROCEDURE — 70546 MR ANGIOGRAPH HEAD W/O&W/DYE: CPT

## 2023-03-29 PROCEDURE — 81001 URINALYSIS AUTO W/SCOPE: CPT

## 2023-03-29 PROCEDURE — C1894 INTRO/SHEATH, NON-LASER: HCPCS

## 2023-03-29 PROCEDURE — A4216 STERILE WATER/SALINE, 10 ML: HCPCS

## 2023-03-29 PROCEDURE — 2580000003 HC RX 258

## 2023-03-29 PROCEDURE — A9576 INJ PROHANCE MULTIPACK: HCPCS | Performed by: NURSE PRACTITIONER

## 2023-03-29 PROCEDURE — 2000000000 HC ICU R&B

## 2023-03-29 PROCEDURE — APPSS30 APP SPLIT SHARED TIME 16-30 MINUTES: Performed by: NURSE PRACTITIONER

## 2023-03-29 PROCEDURE — 80048 BASIC METABOLIC PNL TOTAL CA: CPT

## 2023-03-29 PROCEDURE — 2500000003 HC RX 250 WO HCPCS

## 2023-03-29 PROCEDURE — C1769 GUIDE WIRE: HCPCS

## 2023-03-29 PROCEDURE — 6370000000 HC RX 637 (ALT 250 FOR IP): Performed by: STUDENT IN AN ORGANIZED HEALTH CARE EDUCATION/TRAINING PROGRAM

## 2023-03-29 PROCEDURE — 2580000003 HC RX 258: Performed by: NURSE PRACTITIONER

## 2023-03-29 PROCEDURE — 99232 SBSQ HOSP IP/OBS MODERATE 35: CPT | Performed by: PSYCHIATRY & NEUROLOGY

## 2023-03-29 PROCEDURE — 85025 COMPLETE CBC W/AUTO DIFF WBC: CPT

## 2023-03-29 RX ORDER — LABETALOL HYDROCHLORIDE 5 MG/ML
10 INJECTION, SOLUTION INTRAVENOUS
Status: DISCONTINUED | OUTPATIENT
Start: 2023-03-29 | End: 2023-04-07 | Stop reason: HOSPADM

## 2023-03-29 RX ORDER — FENTANYL CITRATE 50 UG/ML
25 INJECTION, SOLUTION INTRAMUSCULAR; INTRAVENOUS
Status: DISCONTINUED | OUTPATIENT
Start: 2023-03-29 | End: 2023-03-31

## 2023-03-29 RX ORDER — HYDRALAZINE HYDROCHLORIDE 20 MG/ML
10 INJECTION INTRAMUSCULAR; INTRAVENOUS
Status: DISCONTINUED | OUTPATIENT
Start: 2023-03-29 | End: 2023-04-07 | Stop reason: HOSPADM

## 2023-03-29 RX ORDER — LIDOCAINE HYDROCHLORIDE 10 MG/ML
INJECTION, SOLUTION INFILTRATION; PERINEURAL
Status: COMPLETED
Start: 2023-03-29 | End: 2023-03-29

## 2023-03-29 RX ORDER — SODIUM CHLORIDE 9 MG/ML
INJECTION INTRAVENOUS
Status: DISCONTINUED
Start: 2023-03-29 | End: 2023-03-29 | Stop reason: WASHOUT

## 2023-03-29 RX ORDER — PANTOPRAZOLE SODIUM 40 MG/1
40 TABLET, DELAYED RELEASE ORAL
Status: DISCONTINUED | OUTPATIENT
Start: 2023-03-30 | End: 2023-03-31

## 2023-03-29 RX ORDER — LIDOCAINE HYDROCHLORIDE 10 MG/ML
5 INJECTION, SOLUTION INFILTRATION; PERINEURAL ONCE
Status: COMPLETED | OUTPATIENT
Start: 2023-03-29 | End: 2023-03-29

## 2023-03-29 RX ORDER — INSULIN LISPRO 100 [IU]/ML
0-4 INJECTION, SOLUTION INTRAVENOUS; SUBCUTANEOUS EVERY 6 HOURS
Status: DISCONTINUED | OUTPATIENT
Start: 2023-03-29 | End: 2023-04-07 | Stop reason: HOSPADM

## 2023-03-29 RX ORDER — DEXTROSE MONOHYDRATE 100 MG/ML
INJECTION, SOLUTION INTRAVENOUS CONTINUOUS PRN
Status: DISCONTINUED | OUTPATIENT
Start: 2023-03-29 | End: 2023-04-07 | Stop reason: HOSPADM

## 2023-03-29 RX ORDER — SODIUM CHLORIDE 0.9 % (FLUSH) 0.9 %
10 SYRINGE (ML) INJECTION PRN
Status: DISCONTINUED | OUTPATIENT
Start: 2023-03-29 | End: 2023-04-07 | Stop reason: HOSPADM

## 2023-03-29 RX ADMIN — SODIUM CHLORIDE, PRESERVATIVE FREE 10 ML: 5 INJECTION INTRAVENOUS at 17:11

## 2023-03-29 RX ADMIN — DEXAMETHASONE 4 MG: 4 TABLET ORAL at 20:12

## 2023-03-29 RX ADMIN — DEXAMETHASONE 4 MG: 4 TABLET ORAL at 06:54

## 2023-03-29 RX ADMIN — LEVETIRACETAM 500 MG: 500 TABLET, FILM COATED ORAL at 20:12

## 2023-03-29 RX ADMIN — SODIUM CHLORIDE, PRESERVATIVE FREE 10 ML: 5 INJECTION INTRAVENOUS at 20:36

## 2023-03-29 RX ADMIN — LIDOCAINE HYDROCHLORIDE 5 ML: 10 INJECTION, SOLUTION INFILTRATION; PERINEURAL at 20:40

## 2023-03-29 RX ADMIN — LEVETIRACETAM 500 MG: 500 TABLET, FILM COATED ORAL at 09:34

## 2023-03-29 RX ADMIN — GADOTERIDOL 14 ML: 279.3 INJECTION, SOLUTION INTRAVENOUS at 17:11

## 2023-03-29 RX ADMIN — POLYETHYLENE GLYCOL 3350 17 G: 17 POWDER, FOR SOLUTION ORAL at 14:12

## 2023-03-29 RX ADMIN — FENTANYL CITRATE 25 MCG: 50 INJECTION, SOLUTION INTRAMUSCULAR; INTRAVENOUS at 22:11

## 2023-03-29 RX ADMIN — SODIUM CHLORIDE: 9 INJECTION, SOLUTION INTRAVENOUS at 09:39

## 2023-03-29 RX ADMIN — ACETAMINOPHEN 650 MG: 325 TABLET ORAL at 02:21

## 2023-03-29 RX ADMIN — SODIUM CHLORIDE: 9 INJECTION, SOLUTION INTRAMUSCULAR; INTRAVENOUS; SUBCUTANEOUS at 23:41

## 2023-03-29 RX ADMIN — SODIUM CHLORIDE, PRESERVATIVE FREE 10 ML: 5 INJECTION INTRAVENOUS at 09:39

## 2023-03-29 RX ADMIN — DEXAMETHASONE 4 MG: 4 TABLET ORAL at 14:12

## 2023-03-29 RX ADMIN — SODIUM CHLORIDE, PRESERVATIVE FREE 10 ML: 5 INJECTION INTRAVENOUS at 09:34

## 2023-03-29 ASSESSMENT — PAIN SCALES - GENERAL
PAINLEVEL_OUTOF10: 7
PAINLEVEL_OUTOF10: 3
PAINLEVEL_OUTOF10: 3
PAINLEVEL_OUTOF10: 1

## 2023-03-29 ASSESSMENT — PAIN DESCRIPTION - DESCRIPTORS: DESCRIPTORS: ACHING

## 2023-03-29 ASSESSMENT — PAIN DESCRIPTION - LOCATION: LOCATION: ABDOMEN

## 2023-03-29 NOTE — ANESTHESIA POSTPROCEDURE EVALUATION
Department of Anesthesiology  Postprocedure Note    Patient: Mishel Guzman  MRN: [id number]  YOB: 1975  Date of evaluation: 3/29/2023      Procedure Summary     Date: 03/28/23 Room / Location: [address] [place] [address] 83 Procedures    Anesthesia Start: 4448 Anesthesia Stop: 3177    Procedure: IR ANGIOGRAM CAROTID CEREBRAL BILATERAL Diagnosis:       Aneurysm ([address]      AVM (arteriovenous malformation) brain    Scheduled Providers: ISA Jay - CRNA Responsible Provider: Kendra Najjar, MD    Anesthesia Type: general, MAC ASA Status: 2          Anesthesia Type: No value filed.     Patricia Phase I: Patricia Score: 9    Patricia Phase II:        Anesthesia Post Evaluation    Patient location during evaluation: PACU  Patient participation: complete - patient participated  Level of consciousness: awake  Airway patency: patent  Nausea & Vomiting: no nausea and no vomiting  Complications: no  Cardiovascular status: blood pressure returned to baseline  Respiratory status: acceptable  Hydration status: euvolemic  Comments: BP (!) 95/53   Pulse 89   Temp 98.4 °F (36.9 °C)   Resp 15   Ht 5' 5\" (1.651 m)   Wt 158 lb (71.7 kg)   SpO2 93%   BMI 26.29 kg/m²

## 2023-03-30 ENCOUNTER — ANESTHESIA EVENT (OUTPATIENT)
Dept: INTERVENTIONAL RADIOLOGY/VASCULAR | Age: 48
End: 2023-03-30
Payer: COMMERCIAL

## 2023-03-30 ENCOUNTER — ANESTHESIA (OUTPATIENT)
Dept: OPERATING ROOM | Age: 48
End: 2023-03-30
Payer: COMMERCIAL

## 2023-03-30 ENCOUNTER — APPOINTMENT (OUTPATIENT)
Dept: INTERVENTIONAL RADIOLOGY/VASCULAR | Age: 48
End: 2023-03-30
Attending: PSYCHIATRY & NEUROLOGY
Payer: COMMERCIAL

## 2023-03-30 ENCOUNTER — APPOINTMENT (OUTPATIENT)
Dept: CT IMAGING | Age: 48
End: 2023-03-30
Attending: PSYCHIATRY & NEUROLOGY
Payer: COMMERCIAL

## 2023-03-30 ENCOUNTER — ANESTHESIA (OUTPATIENT)
Dept: INTERVENTIONAL RADIOLOGY/VASCULAR | Age: 48
End: 2023-03-30
Payer: COMMERCIAL

## 2023-03-30 LAB
ABSOLUTE EOS #: <0.03 K/UL (ref 0–0.44)
ABSOLUTE IMMATURE GRANULOCYTE: 0.22 K/UL (ref 0–0.3)
ABSOLUTE LYMPH #: 1.22 K/UL (ref 1.1–3.7)
ABSOLUTE MONO #: 0.84 K/UL (ref 0.1–1.2)
ALLEN TEST: ABNORMAL
ANION GAP SERPL CALCULATED.3IONS-SCNC: 10 MMOL/L (ref 9–17)
BASOPHILS # BLD: 0 % (ref 0–2)
BASOPHILS ABSOLUTE: <0.03 K/UL (ref 0–0.2)
BUN SERPL-MCNC: 19 MG/DL (ref 6–20)
CA-I BLD-SCNC: 1.18 MMOL/L (ref 1.13–1.33)
CALCIUM SERPL-MCNC: 8.7 MG/DL (ref 8.6–10.4)
CARBOXYHEMOGLOBIN: 0.5 % (ref 0–5)
CHLORIDE SERPL-SCNC: 109 MMOL/L (ref 98–107)
CHLORIDE, WHOLE BLOOD: 114 MMOL/L (ref 98–110)
CO2 SERPL-SCNC: 22 MMOL/L (ref 20–31)
CREAT SERPL-MCNC: 0.72 MG/DL (ref 0.7–1.2)
EOSINOPHILS RELATIVE PERCENT: 0 % (ref 1–4)
FIO2: ABNORMAL
GFR SERPL CREATININE-BSD FRML MDRD: >60 ML/MIN/1.73M2
GLUCOSE BLD-MCNC: 165 MG/DL (ref 75–110)
GLUCOSE SERPL-MCNC: 147 MG/DL (ref 70–99)
HCO3 ARTERIAL: 19.3 MMOL/L (ref 22–27)
HCT VFR BLD AUTO: 39.7 % (ref 40.7–50.3)
HCT VFR BLD CALC: 39.9 % (ref 40.7–50.3)
HEMOGLOBIN: 13 GM/DL (ref 13–17)
HGB BLD-MCNC: 13.5 G/DL (ref 13–17)
IMMATURE GRANULOCYTES: 1 %
LYMPHOCYTES # BLD: 6 % (ref 24–43)
MCH RBC QN AUTO: 28.6 PG (ref 25.2–33.5)
MCHC RBC AUTO-ENTMCNC: 34 G/DL (ref 28.4–34.8)
MCV RBC AUTO: 84.1 FL (ref 82.6–102.9)
MONOCYTES # BLD: 4 % (ref 3–12)
NEGATIVE BASE EXCESS, ART: 5.9 MMOL/L (ref 0–2)
NRBC AUTOMATED: 0 PER 100 WBC
O2 SAT, ARTERIAL: 97.8 % (ref 94–100)
PARTIAL THROMBOPLASTIN TIME: 27.7 SEC (ref 23–36.5)
PATIENT TEMP: 37
PCO2 ARTERIAL: 39 MMHG (ref 32–45)
PDW BLD-RTO: 14.3 % (ref 11.8–14.4)
PH ARTERIAL: 7.32 (ref 7.35–7.45)
PLATELET # BLD AUTO: 215 K/UL (ref 138–453)
PMV BLD AUTO: 10.5 FL (ref 8.1–13.5)
PO2 ARTERIAL: 120 MMHG (ref 75–95)
POTASSIUM SERPL-SCNC: 3.9 MMOL/L (ref 3.7–5.3)
POTASSIUM, WHOLE BLOOD: 4.1 MMOL/L (ref 3.6–5)
RBC # BLD: 4.72 M/UL (ref 4.21–5.77)
SEG NEUTROPHILS: 89 % (ref 36–65)
SEGMENTED NEUTROPHILS ABSOLUTE COUNT: 18.93 K/UL (ref 1.5–8.1)
SODIUM SERPL-SCNC: 141 MMOL/L (ref 135–144)
SODIUM, WHOLE BLOOD: 142 MMOL/L (ref 136–145)
WBC # BLD AUTO: 21.2 K/UL (ref 3.5–11.3)

## 2023-03-30 PROCEDURE — 2709999900 HC NON-CHARGEABLE SUPPLY

## 2023-03-30 PROCEDURE — 6360000002 HC RX W HCPCS: Performed by: NEUROLOGICAL SURGERY

## 2023-03-30 PROCEDURE — C1887 CATHETER, GUIDING: HCPCS

## 2023-03-30 PROCEDURE — 3700000001 HC ADD 15 MINUTES (ANESTHESIA)

## 2023-03-30 PROCEDURE — 61682 INTRACRANIAL VESSEL SURGERY: CPT | Performed by: NEUROLOGICAL SURGERY

## 2023-03-30 PROCEDURE — C1713 ANCHOR/SCREW BN/BN,TIS/BN: HCPCS | Performed by: NEUROLOGICAL SURGERY

## 2023-03-30 PROCEDURE — 2500000003 HC RX 250 WO HCPCS: Performed by: NEUROLOGICAL SURGERY

## 2023-03-30 PROCEDURE — 2000000000 HC ICU R&B

## 2023-03-30 PROCEDURE — 85018 HEMOGLOBIN: CPT

## 2023-03-30 PROCEDURE — C1894 INTRO/SHEATH, NON-LASER: HCPCS

## 2023-03-30 PROCEDURE — 3700000000 HC ANESTHESIA ATTENDED CARE

## 2023-03-30 PROCEDURE — 6360000002 HC RX W HCPCS

## 2023-03-30 PROCEDURE — 36226 PLACE CATH VERTEBRAL ART: CPT

## 2023-03-30 PROCEDURE — 85014 HEMATOCRIT: CPT

## 2023-03-30 PROCEDURE — 2500000003 HC RX 250 WO HCPCS

## 2023-03-30 PROCEDURE — 61781 SCAN PROC CRANIAL INTRA: CPT | Performed by: NEUROLOGICAL SURGERY

## 2023-03-30 PROCEDURE — 6370000000 HC RX 637 (ALT 250 FOR IP): Performed by: NEUROLOGICAL SURGERY

## 2023-03-30 PROCEDURE — 75894 X-RAYS TRANSCATH THERAPY: CPT

## 2023-03-30 PROCEDURE — 70450 CT HEAD/BRAIN W/O DYE: CPT

## 2023-03-30 PROCEDURE — 3600000014 HC SURGERY LEVEL 4 ADDTL 15MIN: Performed by: NEUROLOGICAL SURGERY

## 2023-03-30 PROCEDURE — 82805 BLOOD GASES W/O2 SATURATION: CPT

## 2023-03-30 PROCEDURE — 2709999900 HC NON-CHARGEABLE SUPPLY: Performed by: NEUROLOGICAL SURGERY

## 2023-03-30 PROCEDURE — C1763 CONN TISS, NON-HUMAN: HCPCS | Performed by: NEUROLOGICAL SURGERY

## 2023-03-30 PROCEDURE — C1889 IMPLANT/INSERT DEVICE, NOC: HCPCS

## 2023-03-30 PROCEDURE — 6360000004 HC RX CONTRAST MEDICATION: Performed by: PSYCHIATRY & NEUROLOGY

## 2023-03-30 PROCEDURE — 3600000004 HC SURGERY LEVEL 4 BASE: Performed by: NEUROLOGICAL SURGERY

## 2023-03-30 PROCEDURE — 6360000002 HC RX W HCPCS: Performed by: NURSE ANESTHETIST, CERTIFIED REGISTERED

## 2023-03-30 PROCEDURE — C1889 IMPLANT/INSERT DEVICE, NOC: HCPCS | Performed by: NEUROLOGICAL SURGERY

## 2023-03-30 PROCEDURE — 2580000003 HC RX 258

## 2023-03-30 PROCEDURE — 36228 PLACE CATH INTRACRANIAL ART: CPT

## 2023-03-30 PROCEDURE — 84132 ASSAY OF SERUM POTASSIUM: CPT

## 2023-03-30 PROCEDURE — 82330 ASSAY OF CALCIUM: CPT

## 2023-03-30 PROCEDURE — 61624 TCAT PERM OCCLS/EMBOLJ CNS: CPT

## 2023-03-30 PROCEDURE — 85730 THROMBOPLASTIN TIME PARTIAL: CPT

## 2023-03-30 PROCEDURE — 76377 3D RENDER W/INTRP POSTPROCES: CPT

## 2023-03-30 PROCEDURE — 80048 BASIC METABOLIC PNL TOTAL CA: CPT

## 2023-03-30 PROCEDURE — 3700000001 HC ADD 15 MINUTES (ANESTHESIA): Performed by: NEUROLOGICAL SURGERY

## 2023-03-30 PROCEDURE — 6360000002 HC RX W HCPCS: Performed by: REGISTERED NURSE

## 2023-03-30 PROCEDURE — 85025 COMPLETE CBC W/AUTO DIFF WBC: CPT

## 2023-03-30 PROCEDURE — 75898 FOLLOW-UP ANGIOGRAPHY: CPT

## 2023-03-30 PROCEDURE — C1769 GUIDE WIRE: HCPCS

## 2023-03-30 PROCEDURE — 2720000010 HC SURG SUPPLY STERILE: Performed by: NEUROLOGICAL SURGERY

## 2023-03-30 PROCEDURE — 3700000000 HC ANESTHESIA ATTENDED CARE: Performed by: NEUROLOGICAL SURGERY

## 2023-03-30 PROCEDURE — 99232 SBSQ HOSP IP/OBS MODERATE 35: CPT | Performed by: PSYCHIATRY & NEUROLOGY

## 2023-03-30 DEVICE — LOW PROFILE BURR HOLE COVER, W/TAB, 14MM
Type: IMPLANTABLE DEVICE | Site: CRANIAL | Status: FUNCTIONAL
Brand: UNIVERSAL NEURO 2

## 2023-03-30 DEVICE — NEURO SCREWS, CROSS-PIN, SELF-DRILLING: Type: IMPLANTABLE DEVICE | Site: BRAIN | Status: FUNCTIONAL

## 2023-03-30 DEVICE — DURAGEN® PLUS DURAL REGENERATION MATRIX, 3 IN X 3 IN (7.5 CM X 7.5 CM)
Type: IMPLANTABLE DEVICE | Site: BRAIN | Status: FUNCTIONAL
Brand: DURAGEN® PLUS

## 2023-03-30 RX ORDER — MEPERIDINE HYDROCHLORIDE 50 MG/ML
12.5 INJECTION INTRAMUSCULAR; INTRAVENOUS; SUBCUTANEOUS EVERY 5 MIN PRN
Status: CANCELLED | OUTPATIENT
Start: 2023-03-30

## 2023-03-30 RX ORDER — SODIUM CHLORIDE 9 MG/ML
INJECTION, SOLUTION INTRAVENOUS CONTINUOUS PRN
Status: DISCONTINUED | OUTPATIENT
Start: 2023-03-30 | End: 2023-03-31 | Stop reason: SDUPTHER

## 2023-03-30 RX ORDER — ONDANSETRON 4 MG/1
4 TABLET, ORALLY DISINTEGRATING ORAL EVERY 8 HOURS PRN
Status: CANCELLED | OUTPATIENT
Start: 2023-03-30

## 2023-03-30 RX ORDER — GINSENG 100 MG
CAPSULE ORAL PRN
Status: DISCONTINUED | OUTPATIENT
Start: 2023-03-30 | End: 2023-03-31 | Stop reason: ALTCHOICE

## 2023-03-30 RX ORDER — DEXAMETHASONE SODIUM PHOSPHATE 10 MG/ML
INJECTION, SOLUTION INTRAMUSCULAR; INTRAVENOUS PRN
Status: DISCONTINUED | OUTPATIENT
Start: 2023-03-30 | End: 2023-03-30 | Stop reason: SDUPTHER

## 2023-03-30 RX ORDER — IODIXANOL 320 MG/ML
100 INJECTION, SOLUTION INTRAVASCULAR
Status: COMPLETED | OUTPATIENT
Start: 2023-03-30 | End: 2023-03-30

## 2023-03-30 RX ORDER — PROPOFOL 10 MG/ML
INJECTION, EMULSION INTRAVENOUS PRN
Status: DISCONTINUED | OUTPATIENT
Start: 2023-03-30 | End: 2023-03-31 | Stop reason: SDUPTHER

## 2023-03-30 RX ORDER — SODIUM CHLORIDE 0.9 % (FLUSH) 0.9 %
5-40 SYRINGE (ML) INJECTION EVERY 12 HOURS SCHEDULED
Status: CANCELLED | OUTPATIENT
Start: 2023-03-30

## 2023-03-30 RX ORDER — HYDRALAZINE HYDROCHLORIDE 20 MG/ML
10 INJECTION INTRAMUSCULAR; INTRAVENOUS
Status: CANCELLED | OUTPATIENT
Start: 2023-03-30

## 2023-03-30 RX ORDER — DROPERIDOL 2.5 MG/ML
0.62 INJECTION, SOLUTION INTRAMUSCULAR; INTRAVENOUS
Status: CANCELLED | OUTPATIENT
Start: 2023-03-30 | End: 2023-03-31

## 2023-03-30 RX ORDER — SODIUM CHLORIDE 9 MG/ML
INJECTION, SOLUTION INTRAVENOUS CONTINUOUS PRN
Status: DISCONTINUED | OUTPATIENT
Start: 2023-03-30 | End: 2023-03-30 | Stop reason: SDUPTHER

## 2023-03-30 RX ORDER — LEVETIRACETAM 10 MG/ML
INJECTION INTRAVASCULAR PRN
Status: DISCONTINUED | OUTPATIENT
Start: 2023-03-30 | End: 2023-03-31 | Stop reason: SDUPTHER

## 2023-03-30 RX ORDER — ONDANSETRON 2 MG/ML
4 INJECTION INTRAMUSCULAR; INTRAVENOUS EVERY 6 HOURS PRN
Status: CANCELLED | OUTPATIENT
Start: 2023-03-30

## 2023-03-30 RX ORDER — SODIUM CHLORIDE 9 MG/ML
INJECTION, SOLUTION INTRAVENOUS PRN
Status: CANCELLED | OUTPATIENT
Start: 2023-03-30

## 2023-03-30 RX ORDER — METOCLOPRAMIDE HYDROCHLORIDE 5 MG/ML
10 INJECTION INTRAMUSCULAR; INTRAVENOUS
Status: CANCELLED | OUTPATIENT
Start: 2023-03-30 | End: 2023-03-31

## 2023-03-30 RX ORDER — LIDOCAINE HYDROCHLORIDE 10 MG/ML
INJECTION, SOLUTION EPIDURAL; INFILTRATION; INTRACAUDAL; PERINEURAL PRN
Status: DISCONTINUED | OUTPATIENT
Start: 2023-03-30 | End: 2023-03-30 | Stop reason: SDUPTHER

## 2023-03-30 RX ORDER — GLYCOPYRROLATE 1 MG/5 ML
SYRINGE (ML) INTRAVENOUS PRN
Status: DISCONTINUED | OUTPATIENT
Start: 2023-03-30 | End: 2023-03-31 | Stop reason: SDUPTHER

## 2023-03-30 RX ORDER — HEPARIN SODIUM 1000 [USP'U]/ML
INJECTION, SOLUTION INTRAVENOUS; SUBCUTANEOUS PRN
Status: DISCONTINUED | OUTPATIENT
Start: 2023-03-30 | End: 2023-03-31 | Stop reason: ALTCHOICE

## 2023-03-30 RX ORDER — FENTANYL CITRATE 50 UG/ML
INJECTION, SOLUTION INTRAMUSCULAR; INTRAVENOUS PRN
Status: DISCONTINUED | OUTPATIENT
Start: 2023-03-30 | End: 2023-03-31 | Stop reason: SDUPTHER

## 2023-03-30 RX ORDER — SODIUM CHLORIDE, SODIUM LACTATE, POTASSIUM CHLORIDE, CALCIUM CHLORIDE 600; 310; 30; 20 MG/100ML; MG/100ML; MG/100ML; MG/100ML
INJECTION, SOLUTION INTRAVENOUS CONTINUOUS PRN
Status: DISCONTINUED | OUTPATIENT
Start: 2023-03-30 | End: 2023-03-30

## 2023-03-30 RX ORDER — SODIUM CHLORIDE 9 MG/ML
25 INJECTION, SOLUTION INTRAVENOUS PRN
Status: CANCELLED | OUTPATIENT
Start: 2023-03-30

## 2023-03-30 RX ORDER — MAGNESIUM HYDROXIDE 1200 MG/15ML
LIQUID ORAL CONTINUOUS PRN
Status: COMPLETED | OUTPATIENT
Start: 2023-03-30 | End: 2023-03-31

## 2023-03-30 RX ORDER — PHENYLEPHRINE HCL IN 0.9% NACL 50MG/250ML
PLASTIC BAG, INJECTION (ML) INTRAVENOUS CONTINUOUS PRN
Status: DISCONTINUED | OUTPATIENT
Start: 2023-03-30 | End: 2023-03-31 | Stop reason: SDUPTHER

## 2023-03-30 RX ORDER — DEXAMETHASONE SODIUM PHOSPHATE 10 MG/ML
INJECTION INTRAMUSCULAR; INTRAVENOUS PRN
Status: DISCONTINUED | OUTPATIENT
Start: 2023-03-30 | End: 2023-03-31 | Stop reason: SDUPTHER

## 2023-03-30 RX ORDER — SODIUM CHLORIDE 0.9 % (FLUSH) 0.9 %
5-40 SYRINGE (ML) INJECTION PRN
Status: CANCELLED | OUTPATIENT
Start: 2023-03-30

## 2023-03-30 RX ORDER — DIPHENHYDRAMINE HYDROCHLORIDE 50 MG/ML
12.5 INJECTION INTRAMUSCULAR; INTRAVENOUS
Status: CANCELLED | OUTPATIENT
Start: 2023-03-30 | End: 2023-03-31

## 2023-03-30 RX ORDER — FENTANYL CITRATE 50 UG/ML
INJECTION, SOLUTION INTRAMUSCULAR; INTRAVENOUS PRN
Status: DISCONTINUED | OUTPATIENT
Start: 2023-03-30 | End: 2023-03-30 | Stop reason: SDUPTHER

## 2023-03-30 RX ORDER — ACETAMINOPHEN 325 MG/1
650 TABLET ORAL EVERY 4 HOURS PRN
Status: CANCELLED | OUTPATIENT
Start: 2023-03-30

## 2023-03-30 RX ORDER — LIDOCAINE HYDROCHLORIDE AND EPINEPHRINE 10; 10 MG/ML; UG/ML
INJECTION, SOLUTION INFILTRATION; PERINEURAL PRN
Status: DISCONTINUED | OUTPATIENT
Start: 2023-03-30 | End: 2023-03-31 | Stop reason: ALTCHOICE

## 2023-03-30 RX ORDER — PROPOFOL 10 MG/ML
INJECTION, EMULSION INTRAVENOUS CONTINUOUS PRN
Status: DISCONTINUED | OUTPATIENT
Start: 2023-03-30 | End: 2023-03-31 | Stop reason: SDUPTHER

## 2023-03-30 RX ORDER — MIDAZOLAM HYDROCHLORIDE 1 MG/ML
INJECTION INTRAMUSCULAR; INTRAVENOUS PRN
Status: DISCONTINUED | OUTPATIENT
Start: 2023-03-30 | End: 2023-03-30 | Stop reason: SDUPTHER

## 2023-03-30 RX ORDER — GLYCOPYRROLATE 1 MG/5 ML
SYRINGE (ML) INTRAVENOUS PRN
Status: DISCONTINUED | OUTPATIENT
Start: 2023-03-30 | End: 2023-03-30 | Stop reason: SDUPTHER

## 2023-03-30 RX ORDER — INDOCYANINE GREEN AND WATER 25 MG
KIT INJECTION PRN
Status: DISCONTINUED | OUTPATIENT
Start: 2023-03-30 | End: 2023-03-31 | Stop reason: SDUPTHER

## 2023-03-30 RX ORDER — ROCURONIUM BROMIDE 10 MG/ML
INJECTION, SOLUTION INTRAVENOUS PRN
Status: DISCONTINUED | OUTPATIENT
Start: 2023-03-30 | End: 2023-03-30 | Stop reason: SDUPTHER

## 2023-03-30 RX ORDER — WOUND DRESSING ADHESIVE - LIQUID
LIQUID MISCELLANEOUS PRN
Status: DISCONTINUED | OUTPATIENT
Start: 2023-03-30 | End: 2023-03-31 | Stop reason: ALTCHOICE

## 2023-03-30 RX ORDER — PROPOFOL 10 MG/ML
INJECTION, EMULSION INTRAVENOUS PRN
Status: DISCONTINUED | OUTPATIENT
Start: 2023-03-30 | End: 2023-03-30 | Stop reason: SDUPTHER

## 2023-03-30 RX ADMIN — MIDAZOLAM 2 MG: 1 INJECTION INTRAMUSCULAR; INTRAVENOUS at 08:42

## 2023-03-30 RX ADMIN — INDOCYANINE GREEN AND WATER 12.5 MG: KIT at 21:24

## 2023-03-30 RX ADMIN — PROPOFOL 100 MCG/KG/MIN: 10 INJECTION, EMULSION INTRAVENOUS at 12:30

## 2023-03-30 RX ADMIN — SODIUM CHLORIDE: 9 INJECTION, SOLUTION INTRAVENOUS at 09:00

## 2023-03-30 RX ADMIN — PHENYLEPHRINE HYDROCHLORIDE 40 MCG/MIN: 10 INJECTION INTRAVENOUS at 12:44

## 2023-03-30 RX ADMIN — PROPOFOL 50 MG: 10 INJECTION, EMULSION INTRAVENOUS at 14:59

## 2023-03-30 RX ADMIN — FENTANYL CITRATE 100 MCG: 50 INJECTION, SOLUTION INTRAMUSCULAR; INTRAVENOUS at 12:42

## 2023-03-30 RX ADMIN — IODIXANOL 114 ML: 320 INJECTION, SOLUTION INTRAVASCULAR at 11:37

## 2023-03-30 RX ADMIN — PHENYLEPHRINE HYDROCHLORIDE 100 MCG: 10 INJECTION INTRAVENOUS at 13:45

## 2023-03-30 RX ADMIN — FENTANYL CITRATE 100 MCG: 50 INJECTION, SOLUTION INTRAMUSCULAR; INTRAVENOUS at 13:52

## 2023-03-30 RX ADMIN — Medication 0.2 MG: at 13:17

## 2023-03-30 RX ADMIN — Medication 2 G: at 09:05

## 2023-03-30 RX ADMIN — ROCURONIUM BROMIDE 20 MG: 10 INJECTION, SOLUTION INTRAVENOUS at 11:22

## 2023-03-30 RX ADMIN — FENTANYL CITRATE 50 MCG: 50 INJECTION, SOLUTION INTRAMUSCULAR; INTRAVENOUS at 09:27

## 2023-03-30 RX ADMIN — SODIUM CHLORIDE: 9 INJECTION, SOLUTION INTRAVENOUS at 08:37

## 2023-03-30 RX ADMIN — Medication 2 G: at 21:01

## 2023-03-30 RX ADMIN — FENTANYL CITRATE 50 MCG: 50 INJECTION, SOLUTION INTRAMUSCULAR; INTRAVENOUS at 20:50

## 2023-03-30 RX ADMIN — FENTANYL CITRATE 50 MCG: 50 INJECTION, SOLUTION INTRAMUSCULAR; INTRAVENOUS at 11:44

## 2023-03-30 RX ADMIN — Medication 0.1 MG: at 10:14

## 2023-03-30 RX ADMIN — SODIUM CHLORIDE: 9 INJECTION, SOLUTION INTRAVENOUS at 12:30

## 2023-03-30 RX ADMIN — FENTANYL CITRATE 25 MCG: 50 INJECTION, SOLUTION INTRAMUSCULAR; INTRAVENOUS at 01:13

## 2023-03-30 RX ADMIN — ROCURONIUM BROMIDE 10 MG: 10 INJECTION, SOLUTION INTRAVENOUS at 09:41

## 2023-03-30 RX ADMIN — FENTANYL CITRATE 50 MCG: 50 INJECTION, SOLUTION INTRAMUSCULAR; INTRAVENOUS at 22:39

## 2023-03-30 RX ADMIN — LEVETIRACETAM 1000 MG: 10 INJECTION INTRAVENOUS at 14:47

## 2023-03-30 RX ADMIN — FENTANYL CITRATE 50 MCG: 50 INJECTION, SOLUTION INTRAMUSCULAR; INTRAVENOUS at 19:30

## 2023-03-30 RX ADMIN — Medication 2 G: at 17:03

## 2023-03-30 RX ADMIN — INDOCYANINE GREEN AND WATER 12.5 MG: KIT at 16:19

## 2023-03-30 RX ADMIN — FENTANYL CITRATE 50 MCG: 50 INJECTION, SOLUTION INTRAMUSCULAR; INTRAVENOUS at 20:59

## 2023-03-30 RX ADMIN — PHENYLEPHRINE HYDROCHLORIDE 50 MCG: 10 INJECTION INTRAVENOUS at 09:36

## 2023-03-30 RX ADMIN — Medication 2 G: at 13:05

## 2023-03-30 RX ADMIN — PHENYLEPHRINE HYDROCHLORIDE 100 MCG: 10 INJECTION INTRAVENOUS at 10:04

## 2023-03-30 RX ADMIN — ROCURONIUM BROMIDE 20 MG: 10 INJECTION, SOLUTION INTRAVENOUS at 10:55

## 2023-03-30 RX ADMIN — FENTANYL CITRATE 50 MCG: 50 INJECTION, SOLUTION INTRAMUSCULAR; INTRAVENOUS at 21:46

## 2023-03-30 RX ADMIN — DEXAMETHASONE SODIUM PHOSPHATE 10 MG: 10 INJECTION, SOLUTION INTRAMUSCULAR; INTRAVENOUS at 09:00

## 2023-03-30 RX ADMIN — FENTANYL CITRATE 100 MCG: 50 INJECTION, SOLUTION INTRAMUSCULAR; INTRAVENOUS at 08:45

## 2023-03-30 RX ADMIN — ROCURONIUM BROMIDE 50 MG: 10 INJECTION, SOLUTION INTRAVENOUS at 08:45

## 2023-03-30 RX ADMIN — LIDOCAINE HYDROCHLORIDE 50 MG: 10 INJECTION, SOLUTION EPIDURAL; INFILTRATION; INTRACAUDAL; PERINEURAL at 08:45

## 2023-03-30 RX ADMIN — ROCURONIUM BROMIDE 20 MG: 10 INJECTION, SOLUTION INTRAVENOUS at 10:14

## 2023-03-30 RX ADMIN — PHENYLEPHRINE HYDROCHLORIDE 40 MCG/MIN: 10 INJECTION INTRAVENOUS at 09:53

## 2023-03-30 RX ADMIN — PROPOFOL 200 MG: 10 INJECTION, EMULSION INTRAVENOUS at 12:42

## 2023-03-30 RX ADMIN — ROCURONIUM BROMIDE 20 MG: 10 INJECTION, SOLUTION INTRAVENOUS at 09:15

## 2023-03-30 RX ADMIN — PROPOFOL 200 MG: 10 INJECTION, EMULSION INTRAVENOUS at 08:45

## 2023-03-30 RX ADMIN — FENTANYL CITRATE 75 MCG: 50 INJECTION, SOLUTION INTRAMUSCULAR; INTRAVENOUS at 14:59

## 2023-03-30 RX ADMIN — PHENYLEPHRINE HYDROCHLORIDE 50 MCG: 10 INJECTION INTRAVENOUS at 09:33

## 2023-03-30 RX ADMIN — Medication 10 MCG/MIN: at 19:59

## 2023-03-30 RX ADMIN — INDOCYANINE GREEN AND WATER 12.5 MG: KIT at 17:05

## 2023-03-30 RX ADMIN — SODIUM CHLORIDE: 9 INJECTION, SOLUTION INTRAVENOUS at 12:07

## 2023-03-30 RX ADMIN — FENTANYL CITRATE 25 MCG: 50 INJECTION, SOLUTION INTRAMUSCULAR; INTRAVENOUS at 14:23

## 2023-03-30 RX ADMIN — PHENYLEPHRINE HYDROCHLORIDE 100 MCG: 10 INJECTION INTRAVENOUS at 09:53

## 2023-03-30 RX ADMIN — PHENYLEPHRINE HYDROCHLORIDE 100 MCG: 10 INJECTION INTRAVENOUS at 09:41

## 2023-03-30 RX ADMIN — DEXAMETHASONE SODIUM PHOSPHATE 4 MG: 10 INJECTION INTRAMUSCULAR; INTRAVENOUS at 16:59

## 2023-03-30 RX ADMIN — PHENYLEPHRINE HYDROCHLORIDE 100 MCG: 10 INJECTION INTRAVENOUS at 09:46

## 2023-03-30 ASSESSMENT — PAIN - FUNCTIONAL ASSESSMENT: PAIN_FUNCTIONAL_ASSESSMENT: 0-10

## 2023-03-30 ASSESSMENT — PAIN SCALES - GENERAL
PAINLEVEL_OUTOF10: 0
PAINLEVEL_OUTOF10: 4

## 2023-03-30 NOTE — SEDATION DOCUMENTATION
1148 Pt transferred to CT on monitor with CRNAs and NAN  1200 Pt transferred to OR on monitor with CRNAs and NAN. Clean, dry and intact dressing over sheath site. Pedal pulses palpable. Vss. Pt remains intubated and sedated for transport.  Report to OR nurse

## 2023-03-30 NOTE — PROCEDURES
Patient Name: Maria Esther Lim   Medical Record Number: [id number]  Date: 3/29/2023   Time: 10:41 PM   Room/Bed: [address] [place] [address]  4Fr Short Sheat Arterial Placement Procedure Note                   Indication: intracranial disease    Consent: The patient was, spouse was, and family members were counseled regarding the procedure, its indications, risks, potential complications and alternatives, and any questions were answered. Consent was obtained to proceed. Procedure: The skin over the left femoral artery was prepped with betadine and draped in a sterile fashion. Local anesthesia was obtained by infiltration using 1% Lidocaine without epinephrine. A 4 Hungarian angiocath was then inserted, using a modified Seldinger technique, into the vessel. The transducer set was then attached and securely fastened to the skin with sutures. Waveforms on the monitor were observed and found to be adequate. The patient had good distal perfusion after the procedure. The site was then dressed in a sterile fashion. The patient tolerated the procedure well.      Complications: None    Electronically Signed by: Prema Chandler MD

## 2023-03-30 NOTE — SEDATION DOCUMENTATION
Pt arrives to neuro IR with left groin sheath in place and distal pulses palpable. Jenkins, left radial art line and PIVs intact. Pt transferred to table, applied to monitors and intubated per anesthesia.  Continue to closely monitor

## 2023-03-30 NOTE — ANESTHESIA POSTPROCEDURE EVALUATION
Department of Anesthesiology  Postprocedure Note    Patient: Nimisha Harrison  MRN: [id number]  YOB: 1975  Date of evaluation: 3/30/2023      Procedure Summary     Date: 03/30/23 Room / Location: [address] [place] [address] 83 Procedures    Anesthesia Start: 1271 Anesthesia Stop: 0763    Procedure: IR ANGIOGRAM CAROTID CEREBRAL BILATERAL Diagnosis: (avm)    Scheduled Providers:  Responsible Provider: Regan Hurtado MD    Anesthesia Type: general, MAC ASA Status: 2          Anesthesia Type: No value filed.     Patricia Phase I: Patricia Score: 9    Patricia Phase II:        Anesthesia Post Evaluation    Patient location during evaluation: bedside  Patient participation: complete - patient cannot participate  Level of consciousness: sedated and ventilated  Nausea & Vomiting: no vomiting and no nausea  Complications: no  Cardiovascular status: hemodynamically stable  Respiratory status: ventilator and intubated  Hydration status: stable

## 2023-03-30 NOTE — ANESTHESIA PRE PROCEDURE
Department of Anesthesiology  Preprocedure Note       Name:  Deepa Jo   Age:  51 y.o.  :  1975                                          MRN:  [id number]         Date:  3/30/2023      Surgeon: * No surgeons listed *    Procedure: * No procedures listed *    Medications prior to admission:   Prior to Admission medications    Medication Sig Start Date End Date Taking? Authorizing Provider   Acetaminophen (TYLENOL 8 HOUR PO) Take by mouth As needed. Historical Provider, MD       Current medications:    No current facility-administered medications for this visit. No current outpatient medications on file.      Facility-Administered Medications Ordered in Other Visits   Medication Dose Route Frequency Provider Last Rate Last Admin    [MAR Hold] pantoprazole (PROTONIX) tablet 40 mg  40 mg Oral QAM AC ISA Moyer CNP        [MAR Hold] hydrALAZINE (APRESOLINE) injection 10 mg  10 mg IntraVENous Q1H PRN ISA Youssef CNP        [MAR Hold] labetalol (NORMODYNE;TRANDATE) injection 10 mg  10 mg IntraVENous Q1H PRN ISA Youssef CNP        [MAR Hold] insulin lispro (HUMALOG) injection vial 0-4 Units  0-4 Units SubCUTAneous Q6H ISA Youssef CNP        [MAR Hold] glucose chewable tablet 16 g  4 tablet Oral PRN ISA Youssef CNP        [MAR Hold] dextrose bolus 10% 125 mL  125 mL IntraVENous PRN ISA Youssef CNP        Or    [MAR Hold] dextrose bolus 10% 250 mL  250 mL IntraVENous PRN ISA Youssef CNP        [MAR Hold] glucagon (rDNA) injection 1 mg  1 mg SubCUTAneous PRN ISA Youssef CNP        [MAR Hold] dextrose 10 % infusion   IntraVENous Continuous PRN ISA Youssef - CNP        [facility] Hold] sodium chloride flush 0.9 % injection 10 mL  10 mL IntraVENous PRN ISA Rodríguez NP   10 mL at 23 1711    [MAR Hold] fentaNYL (SUBLIMAZE) injection 25 mcg  25 mcg IntraVENous Y7S PRN ISA Siu CNP   25

## 2023-03-30 NOTE — ANESTHESIA PRE PROCEDURE
Department of Anesthesiology  Preprocedure Note       Name:  Hector Osorio   Age:  51 y.o.  :  1975                                          MRN:  [id number]         Date:  3/30/2023      Surgeon: Kilo Reid):  Sravanthi Mendez DO    Procedure: Procedure(s):  PARIETAL OCCIPITAL CRANIOTOMY  (REGULAR TABLE, MOSHER HEADHOLDER, MICROSCOPE, NEEDS C- MAX BED, MEDTRONIC, STEALTH NAVIGATION, MICROSCISSORS, *AUTO*, ULTRASOUND, EVOKES CONF# [id number] -GABY)  *DR GALLEGOS, DR. BATISTA TO ASSIST*    Medications prior to admission:   Prior to Admission medications    Medication Sig Start Date End Date Taking? Authorizing Provider   Acetaminophen (TYLENOL 8 HOUR PO) Take by mouth As needed. Historical Provider, MD       Current medications:    No current facility-administered medications for this visit. No current outpatient medications on file.      Facility-Administered Medications Ordered in Other Visits   Medication Dose Route Frequency Provider Last Rate Last Admin    [MAR Hold] pantoprazole (PROTONIX) tablet 40 mg  40 mg [facility]elect [facility] ColumbusISA Longoria CNP        [MAR Hold] hydrALAZINE (APRESOLINE) injection 10 mg  10 mg IntraVENous Q1H PRN ISA Brady CNP        [MAR Hold] labetalol (NORMODYNE;TRANDATE) injection 10 mg  10 mg IntraVENous Q1H PRN ISA Brady CNP        [MAR Hold] insulin lispro (HUMALOG) injection vial 0-4 Units  0-4 Units SubCUTAneous Q6H ISA Brady CNP        [MAR Hold] glucose chewable tablet 16 g  4 tablet Oral PRN ISA Brady CNP        [MAR Hold] dextrose bolus 10% 125 mL  125 mL IntraVENous PRN ISA Brady CNP        Or    [MAR Hold] dextrose bolus 10% 250 mL  250 mL IntraVENous PRN ISA Brady CNP        [MAR Hold] glucagon (rDNA) injection 1 mg  1 mg SubCUTAneous PRN ISA Brady CNP        [MAR Hold] dextrose 10 % infusion   IntraVENous Continuous PRN ISA Brady - LUCY        [facility] Hold] sodium

## 2023-03-31 ENCOUNTER — APPOINTMENT (OUTPATIENT)
Dept: GENERAL RADIOLOGY | Age: 48
End: 2023-03-31
Attending: PSYCHIATRY & NEUROLOGY
Payer: COMMERCIAL

## 2023-03-31 ENCOUNTER — APPOINTMENT (OUTPATIENT)
Dept: CT IMAGING | Age: 48
End: 2023-03-31
Attending: PSYCHIATRY & NEUROLOGY
Payer: COMMERCIAL

## 2023-03-31 ENCOUNTER — APPOINTMENT (OUTPATIENT)
Dept: INTERVENTIONAL RADIOLOGY/VASCULAR | Age: 48
End: 2023-03-31
Attending: PSYCHIATRY & NEUROLOGY
Payer: COMMERCIAL

## 2023-03-31 LAB
ABO/RH: NORMAL
ABSOLUTE EOS #: <0.03 K/UL (ref 0–0.44)
ABSOLUTE IMMATURE GRANULOCYTE: 0.22 K/UL (ref 0–0.3)
ABSOLUTE LYMPH #: 1.18 K/UL (ref 1.1–3.7)
ABSOLUTE MONO #: 1.23 K/UL (ref 0.1–1.2)
ALBUMIN SERPL-MCNC: 2.4 G/DL (ref 3.5–5.2)
ALBUMIN/GLOBULIN RATIO: 1.1 (ref 1–2.5)
ALLEN TEST: ABNORMAL
ALLEN TEST: ABNORMAL
ALP SERPL-CCNC: 53 U/L (ref 40–129)
ALT SERPL-CCNC: 19 U/L (ref 5–41)
ANION GAP SERPL CALCULATED.3IONS-SCNC: 7 MMOL/L (ref 9–17)
ANTIBODY SCREEN: NEGATIVE
ARM BAND NUMBER: NORMAL
AST SERPL-CCNC: 20 U/L
BASOPHILS # BLD: 0 % (ref 0–2)
BASOPHILS ABSOLUTE: 0.03 K/UL (ref 0–0.2)
BILIRUB SERPL-MCNC: <0.1 MG/DL (ref 0.3–1.2)
BLD PROD TYP BPU: NORMAL
BPU ID: NORMAL
BUN SERPL-MCNC: 25 MG/DL (ref 6–20)
CA-I BLD-SCNC: 1.11 MMOL/L (ref 1.13–1.33)
CA-I BLD-SCNC: 1.15 MMOL/L (ref 1.13–1.33)
CALCIUM SERPL-MCNC: 7.3 MG/DL (ref 8.6–10.4)
CARBOXYHEMOGLOBIN: 0.2 % (ref 0–5)
CARBOXYHEMOGLOBIN: 0.4 % (ref 0–5)
CHLORIDE SERPL-SCNC: 113 MMOL/L (ref 98–107)
CHLORIDE, WHOLE BLOOD: 117 MMOL/L (ref 98–110)
CHLORIDE, WHOLE BLOOD: 118 MMOL/L (ref 98–110)
CO2 SERPL-SCNC: 20 MMOL/L (ref 20–31)
CREAT SERPL-MCNC: 0.98 MG/DL (ref 0.7–1.2)
CROSSMATCH RESULT: NORMAL
DISPENSE STATUS BLOOD BANK: NORMAL
EOSINOPHILS RELATIVE PERCENT: 0 % (ref 1–4)
EXPIRATION DATE: NORMAL
FIO2: 50
FIO2: 60
FIO2: ABNORMAL
GFR SERPL CREATININE-BSD FRML MDRD: >60 ML/MIN/1.73M2
GLUCOSE BLD-MCNC: 141 MG/DL (ref 75–110)
GLUCOSE BLD-MCNC: 153 MG/DL (ref 74–100)
GLUCOSE BLD-MCNC: 155 MG/DL (ref 75–110)
GLUCOSE BLD-MCNC: 156 MG/DL (ref 75–110)
GLUCOSE SERPL-MCNC: 166 MG/DL (ref 70–99)
HCO3 ARTERIAL: 18 MMOL/L (ref 22–27)
HCO3 ARTERIAL: 18.2 MMOL/L (ref 22–27)
HCT VFR BLD AUTO: 29.3 % (ref 40.7–50.3)
HCT VFR BLD CALC: 33.7 % (ref 40.7–50.3)
HCT VFR BLD CALC: 35.6 % (ref 40.7–50.3)
HEMOGLOBIN: 10.9 GM/DL (ref 13–17)
HEMOGLOBIN: 11.6 GM/DL (ref 13–17)
HGB BLD-MCNC: 9.9 G/DL (ref 13–17)
IMMATURE GRANULOCYTES: 1 %
LYMPHOCYTES # BLD: 7 % (ref 24–43)
MAGNESIUM SERPL-MCNC: 1.9 MG/DL (ref 1.6–2.6)
MCH RBC QN AUTO: 29.5 PG (ref 25.2–33.5)
MCHC RBC AUTO-ENTMCNC: 33.8 G/DL (ref 28.4–34.8)
MCV RBC AUTO: 87.2 FL (ref 82.6–102.9)
MONOCYTES # BLD: 8 % (ref 3–12)
NEGATIVE BASE EXCESS, ART: 4 (ref 0–2)
NEGATIVE BASE EXCESS, ART: 5.4 MMOL/L (ref 0–2)
NEGATIVE BASE EXCESS, ART: 6.2 MMOL/L (ref 0–2)
NRBC AUTOMATED: 0 PER 100 WBC
O2 SAT, ARTERIAL: 98.7 % (ref 94–100)
O2 SAT, ARTERIAL: 98.8 % (ref 94–100)
PATIENT TEMP: 36.5
PATIENT TEMP: 36.8
PCO2 ARTERIAL: 30.3 MMHG (ref 32–45)
PCO2 ARTERIAL: 34 MMHG (ref 32–45)
PDW BLD-RTO: 14.6 % (ref 11.8–14.4)
PH ARTERIAL: 7.35 (ref 7.35–7.45)
PH ARTERIAL: 7.39 (ref 7.35–7.45)
PLATELET # BLD AUTO: ABNORMAL K/UL (ref 138–453)
PLATELET, FLUORESCENCE: 151 K/UL (ref 138–453)
PLATELET, IMMATURE FRACTION: 7.7 % (ref 1.1–10.3)
PO2 ARTERIAL: 140 MMHG (ref 75–95)
PO2 ARTERIAL: 159 MMHG (ref 75–95)
POC HCO3: 20.5 MMOL/L (ref 21–28)
POC O2 SATURATION: 99 % (ref 94–98)
POC PCO2: 34.6 MM HG (ref 35–48)
POC PH: 7.38 (ref 7.35–7.45)
POC PO2: 137.4 MM HG (ref 83–108)
POTASSIUM SERPL-SCNC: 4.3 MMOL/L (ref 3.7–5.3)
POTASSIUM, WHOLE BLOOD: 3.7 MMOL/L (ref 3.6–5)
POTASSIUM, WHOLE BLOOD: 4.1 MMOL/L (ref 3.6–5)
PROT SERPL-MCNC: 4.5 G/DL (ref 6.4–8.3)
RBC # BLD: 3.36 M/UL (ref 4.21–5.77)
RBC # BLD: ABNORMAL 10*6/UL
SEG NEUTROPHILS: 83 % (ref 36–65)
SEGMENTED NEUTROPHILS ABSOLUTE COUNT: 13.3 K/UL (ref 1.5–8.1)
SODIUM SERPL-SCNC: 140 MMOL/L (ref 135–144)
SODIUM, WHOLE BLOOD: 142 MMOL/L (ref 136–145)
SODIUM, WHOLE BLOOD: 142 MMOL/L (ref 136–145)
TRANSFUSION STATUS: NORMAL
UNIT DIVISION: 0
WBC # BLD AUTO: 16 K/UL (ref 3.5–11.3)

## 2023-03-31 PROCEDURE — 2000000000 HC ICU R&B

## 2023-03-31 PROCEDURE — 2580000003 HC RX 258: Performed by: NURSE PRACTITIONER

## 2023-03-31 PROCEDURE — 6370000000 HC RX 637 (ALT 250 FOR IP): Performed by: NURSE PRACTITIONER

## 2023-03-31 PROCEDURE — 83735 ASSAY OF MAGNESIUM: CPT

## 2023-03-31 PROCEDURE — 84132 ASSAY OF SERUM POTASSIUM: CPT

## 2023-03-31 PROCEDURE — 2500000003 HC RX 250 WO HCPCS: Performed by: NURSE PRACTITIONER

## 2023-03-31 PROCEDURE — 2580000003 HC RX 258: Performed by: NEUROLOGICAL SURGERY

## 2023-03-31 PROCEDURE — 6360000004 HC RX CONTRAST MEDICATION

## 2023-03-31 PROCEDURE — 82805 BLOOD GASES W/O2 SATURATION: CPT

## 2023-03-31 PROCEDURE — 36224 PLACE CATH CAROTD ART: CPT

## 2023-03-31 PROCEDURE — 88305 TISSUE EXAM BY PATHOLOGIST: CPT

## 2023-03-31 PROCEDURE — 6360000002 HC RX W HCPCS

## 2023-03-31 PROCEDURE — 82330 ASSAY OF CALCIUM: CPT

## 2023-03-31 PROCEDURE — 82803 BLOOD GASES ANY COMBINATION: CPT

## 2023-03-31 PROCEDURE — 6360000002 HC RX W HCPCS: Performed by: NEUROLOGICAL SURGERY

## 2023-03-31 PROCEDURE — 2700000000 HC OXYGEN THERAPY PER DAY

## 2023-03-31 PROCEDURE — 82435 ASSAY OF BLOOD CHLORIDE: CPT

## 2023-03-31 PROCEDURE — 6360000002 HC RX W HCPCS: Performed by: NURSE PRACTITIONER

## 2023-03-31 PROCEDURE — 85025 COMPLETE CBC W/AUTO DIFF WBC: CPT

## 2023-03-31 PROCEDURE — 70450 CT HEAD/BRAIN W/O DYE: CPT

## 2023-03-31 PROCEDURE — 2709999900 HC NON-CHARGEABLE SUPPLY

## 2023-03-31 PROCEDURE — 37799 UNLISTED PX VASCULAR SURGERY: CPT

## 2023-03-31 PROCEDURE — 2580000003 HC RX 258

## 2023-03-31 PROCEDURE — 71045 X-RAY EXAM CHEST 1 VIEW: CPT

## 2023-03-31 PROCEDURE — 2580000003 HC RX 258: Performed by: STUDENT IN AN ORGANIZED HEALTH CARE EDUCATION/TRAINING PROGRAM

## 2023-03-31 PROCEDURE — 95711 VEEG 2-12 HR UNMONITORED: CPT

## 2023-03-31 PROCEDURE — 3209999900 FLUORO FOR SURGICAL PROCEDURES

## 2023-03-31 PROCEDURE — 82947 ASSAY GLUCOSE BLOOD QUANT: CPT

## 2023-03-31 PROCEDURE — 84295 ASSAY OF SERUM SODIUM: CPT

## 2023-03-31 PROCEDURE — APPNB60 APP NON BILLABLE TIME 46-60 MINS: Performed by: NURSE PRACTITIONER

## 2023-03-31 PROCEDURE — 2500000003 HC RX 250 WO HCPCS

## 2023-03-31 PROCEDURE — 85055 RETICULATED PLATELET ASSAY: CPT

## 2023-03-31 PROCEDURE — C1769 GUIDE WIRE: HCPCS

## 2023-03-31 PROCEDURE — 6370000000 HC RX 637 (ALT 250 FOR IP): Performed by: NEUROLOGICAL SURGERY

## 2023-03-31 PROCEDURE — 80053 COMPREHEN METABOLIC PANEL: CPT

## 2023-03-31 PROCEDURE — 95700 EEG CONT REC W/VID EEG TECH: CPT

## 2023-03-31 PROCEDURE — 99291 CRITICAL CARE FIRST HOUR: CPT | Performed by: PSYCHIATRY & NEUROLOGY

## 2023-03-31 PROCEDURE — C1760 CLOSURE DEV, VASC: HCPCS

## 2023-03-31 PROCEDURE — 85018 HEMOGLOBIN: CPT

## 2023-03-31 PROCEDURE — 94002 VENT MGMT INPAT INIT DAY: CPT

## 2023-03-31 PROCEDURE — C9113 INJ PANTOPRAZOLE SODIUM, VIA: HCPCS | Performed by: NURSE PRACTITIONER

## 2023-03-31 PROCEDURE — C1887 CATHETER, GUIDING: HCPCS

## 2023-03-31 PROCEDURE — 82962 GLUCOSE BLOOD TEST: CPT

## 2023-03-31 PROCEDURE — 94761 N-INVAS EAR/PLS OXIMETRY MLT: CPT

## 2023-03-31 PROCEDURE — 85014 HEMATOCRIT: CPT

## 2023-03-31 RX ORDER — ENOXAPARIN SODIUM 100 MG/ML
40 INJECTION SUBCUTANEOUS DAILY
Status: DISCONTINUED | OUTPATIENT
Start: 2023-03-31 | End: 2023-04-07 | Stop reason: HOSPADM

## 2023-03-31 RX ORDER — SODIUM CHLORIDE 9 MG/ML
INJECTION, SOLUTION INTRAVENOUS PRN
Status: DISCONTINUED | OUTPATIENT
Start: 2023-03-31 | End: 2023-03-31

## 2023-03-31 RX ORDER — VANCOMYCIN HYDROCHLORIDE 1 G/20ML
INJECTION, POWDER, LYOPHILIZED, FOR SOLUTION INTRAVENOUS PRN
Status: DISCONTINUED | OUTPATIENT
Start: 2023-03-31 | End: 2023-03-31 | Stop reason: ALTCHOICE

## 2023-03-31 RX ORDER — ONDANSETRON 4 MG/1
4 TABLET, ORALLY DISINTEGRATING ORAL EVERY 8 HOURS PRN
Status: DISCONTINUED | OUTPATIENT
Start: 2023-03-31 | End: 2023-03-31 | Stop reason: SDUPTHER

## 2023-03-31 RX ORDER — DEXMEDETOMIDINE HYDROCHLORIDE 4 UG/ML
.1-1.5 INJECTION, SOLUTION INTRAVENOUS CONTINUOUS
Status: DISCONTINUED | OUTPATIENT
Start: 2023-03-31 | End: 2023-03-31

## 2023-03-31 RX ORDER — OXYCODONE HYDROCHLORIDE 5 MG/1
5 TABLET ORAL EVERY 4 HOURS PRN
Status: DISCONTINUED | OUTPATIENT
Start: 2023-03-31 | End: 2023-03-31

## 2023-03-31 RX ORDER — SODIUM CHLORIDE 0.9 % (FLUSH) 0.9 %
5-40 SYRINGE (ML) INJECTION PRN
Status: DISCONTINUED | OUTPATIENT
Start: 2023-03-31 | End: 2023-03-31

## 2023-03-31 RX ORDER — SODIUM CHLORIDE 0.9 % (FLUSH) 0.9 %
5-40 SYRINGE (ML) INJECTION EVERY 12 HOURS SCHEDULED
Status: DISCONTINUED | OUTPATIENT
Start: 2023-03-31 | End: 2023-03-31

## 2023-03-31 RX ORDER — OXYCODONE HYDROCHLORIDE 5 MG/1
10 TABLET ORAL EVERY 4 HOURS PRN
Status: DISCONTINUED | OUTPATIENT
Start: 2023-03-31 | End: 2023-03-31

## 2023-03-31 RX ORDER — ONDANSETRON 4 MG/1
4 TABLET, ORALLY DISINTEGRATING ORAL EVERY 8 HOURS PRN
Status: DISCONTINUED | OUTPATIENT
Start: 2023-03-31 | End: 2023-03-31

## 2023-03-31 RX ORDER — IODIXANOL 320 MG/ML
100 INJECTION, SOLUTION INTRAVASCULAR
Status: COMPLETED | OUTPATIENT
Start: 2023-03-31 | End: 2023-03-31

## 2023-03-31 RX ORDER — ACETAMINOPHEN 325 MG/1
650 TABLET ORAL EVERY 4 HOURS PRN
Status: DISCONTINUED | OUTPATIENT
Start: 2023-03-31 | End: 2023-03-31

## 2023-03-31 RX ORDER — ONDANSETRON 2 MG/ML
4 INJECTION INTRAMUSCULAR; INTRAVENOUS EVERY 6 HOURS PRN
Status: DISCONTINUED | OUTPATIENT
Start: 2023-03-31 | End: 2023-03-31

## 2023-03-31 RX ORDER — DEXAMETHASONE SODIUM PHOSPHATE 4 MG/ML
4 INJECTION, SOLUTION INTRA-ARTICULAR; INTRALESIONAL; INTRAMUSCULAR; INTRAVENOUS; SOFT TISSUE EVERY 8 HOURS
Status: DISCONTINUED | OUTPATIENT
Start: 2023-03-31 | End: 2023-04-01

## 2023-03-31 RX ORDER — FENTANYL CITRATE 50 UG/ML
25 INJECTION, SOLUTION INTRAMUSCULAR; INTRAVENOUS
Status: DISCONTINUED | OUTPATIENT
Start: 2023-03-31 | End: 2023-04-07 | Stop reason: HOSPADM

## 2023-03-31 RX ORDER — LEVETIRACETAM 500 MG/5ML
500 INJECTION, SOLUTION, CONCENTRATE INTRAVENOUS EVERY 12 HOURS
Status: DISCONTINUED | OUTPATIENT
Start: 2023-03-31 | End: 2023-04-06

## 2023-03-31 RX ORDER — ONDANSETRON 2 MG/ML
4 INJECTION INTRAMUSCULAR; INTRAVENOUS EVERY 6 HOURS PRN
Status: DISCONTINUED | OUTPATIENT
Start: 2023-03-31 | End: 2023-03-31 | Stop reason: SDUPTHER

## 2023-03-31 RX ADMIN — LEVETIRACETAM 1000 MG: 10 INJECTION INTRAVENOUS at 02:52

## 2023-03-31 RX ADMIN — INDOCYANINE GREEN AND WATER 12.5 MG: KIT at 02:44

## 2023-03-31 RX ADMIN — LEVETIRACETAM 500 MG: 100 INJECTION, SOLUTION INTRAVENOUS at 15:53

## 2023-03-31 RX ADMIN — SODIUM CHLORIDE, PRESERVATIVE FREE 10 ML: 5 INJECTION INTRAVENOUS at 11:02

## 2023-03-31 RX ADMIN — IODIXANOL 72 ML: 320 INJECTION, SOLUTION INTRAVASCULAR at 06:16

## 2023-03-31 RX ADMIN — FENTANYL CITRATE 100 MCG: 50 INJECTION, SOLUTION INTRAMUSCULAR; INTRAVENOUS at 01:10

## 2023-03-31 RX ADMIN — FENTANYL CITRATE 25 MCG: 50 INJECTION, SOLUTION INTRAMUSCULAR; INTRAVENOUS at 16:07

## 2023-03-31 RX ADMIN — DEXAMETHASONE SODIUM PHOSPHATE 4 MG: 4 INJECTION, SOLUTION INTRAMUSCULAR; INTRAVENOUS at 11:01

## 2023-03-31 RX ADMIN — PROPOFOL 50 MG: 10 INJECTION, EMULSION INTRAVENOUS at 01:46

## 2023-03-31 RX ADMIN — DEXAMETHASONE SODIUM PHOSPHATE 4 MG: 4 INJECTION, SOLUTION INTRAMUSCULAR; INTRAVENOUS at 17:37

## 2023-03-31 RX ADMIN — Medication 2000 MG: at 20:37

## 2023-03-31 RX ADMIN — Medication 2 G: at 01:01

## 2023-03-31 RX ADMIN — OXYCODONE 5 MG: 5 TABLET ORAL at 20:31

## 2023-03-31 RX ADMIN — FENTANYL CITRATE 100 MCG: 50 INJECTION, SOLUTION INTRAMUSCULAR; INTRAVENOUS at 00:04

## 2023-03-31 RX ADMIN — DEXAMETHASONE SODIUM PHOSPHATE 10 MG: 10 INJECTION INTRAMUSCULAR; INTRAVENOUS at 01:33

## 2023-03-31 RX ADMIN — SODIUM CHLORIDE: 9 INJECTION, SOLUTION INTRAVENOUS at 13:35

## 2023-03-31 RX ADMIN — Medication 2 G: at 05:11

## 2023-03-31 RX ADMIN — Medication 2000 MG: at 13:17

## 2023-03-31 RX ADMIN — DEXMEDETOMIDINE HYDROCHLORIDE 0.1 MCG/KG/HR: 400 INJECTION INTRAVENOUS at 13:20

## 2023-03-31 RX ADMIN — FENTANYL CITRATE 25 MCG: 50 INJECTION, SOLUTION INTRAMUSCULAR; INTRAVENOUS at 13:27

## 2023-03-31 RX ADMIN — FENTANYL CITRATE 250 MCG: 50 INJECTION, SOLUTION INTRAMUSCULAR; INTRAVENOUS at 01:46

## 2023-03-31 RX ADMIN — FENTANYL CITRATE 150 MCG: 50 INJECTION, SOLUTION INTRAMUSCULAR; INTRAVENOUS at 00:12

## 2023-03-31 RX ADMIN — ENOXAPARIN SODIUM 40 MG: 100 INJECTION SUBCUTANEOUS at 18:23

## 2023-03-31 RX ADMIN — INDOCYANINE GREEN AND WATER 12.5 MG: KIT at 00:40

## 2023-03-31 RX ADMIN — SODIUM CHLORIDE: 9 INJECTION, SOLUTION INTRAVENOUS at 02:43

## 2023-03-31 RX ADMIN — SODIUM CHLORIDE, PRESERVATIVE FREE 40 MG: 5 INJECTION INTRAVENOUS at 11:02

## 2023-03-31 RX ADMIN — SODIUM CHLORIDE, PRESERVATIVE FREE 10 ML: 5 INJECTION INTRAVENOUS at 20:31

## 2023-03-31 RX ADMIN — PHENYLEPHRINE HYDROCHLORIDE 250 MCG: 10 INJECTION INTRAVENOUS at 03:08

## 2023-03-31 ASSESSMENT — PAIN SCALES - GENERAL
PAINLEVEL_OUTOF10: 0
PAINLEVEL_OUTOF10: 6
PAINLEVEL_OUTOF10: 5
PAINLEVEL_OUTOF10: 5

## 2023-03-31 ASSESSMENT — PULMONARY FUNCTION TESTS
PIF_VALUE: 18
PIF_VALUE: 19
PIF_VALUE: 16
PIF_VALUE: 18
PIF_VALUE: 22
PIF_VALUE: 17
PIF_VALUE: 18
PIF_VALUE: 17
PIF_VALUE: 18
PIF_VALUE: 16

## 2023-03-31 ASSESSMENT — PAIN DESCRIPTION - LOCATION: LOCATION: HEAD

## 2023-03-31 ASSESSMENT — PAIN DESCRIPTION - DESCRIPTORS: DESCRIPTORS: ACHING

## 2023-03-31 ASSESSMENT — PAIN DESCRIPTION - ORIENTATION: ORIENTATION: LEFT

## 2023-03-31 ASSESSMENT — PAIN - FUNCTIONAL ASSESSMENT: PAIN_FUNCTIONAL_ASSESSMENT: PREVENTS OR INTERFERES WITH MANY ACTIVE NOT PASSIVE ACTIVITIES

## 2023-03-31 NOTE — OP NOTE
of the dura away from the bone and completed with a craniotome. There was expected bleeding from the dura and was controlled with combination of bipolar cautery and tamponade. Once the temporofrontal craniotomy was done, I was able to bipolar the middle meningeal artery at the origin to gain early control this feeder to the AVM. After this was done I then completed the parietal occipital craniotomy. Dura was opened with a 15 blade using a cruciate manner. There were expected dura bleeding at the edges which was controlled with bipolar cautery. There was some adhesion of the dura to the underlying brain and small feeding artery which now has very little flow to the nidus was cauterized and cut. The surface anatomy was visualized nicely showing obvious large arterialized draining vein inferiorly and towards the lateral occipital lobe and eventually draining into the deep venous system as well as 2 smaller arterialized vein draining towards the midline sagittal sinus on close inspection. Intraoperative ICG was used to help visualize this. The microscope was used for the rest of the operation. We began with arachnoid dissection, using micro dissectors and microscissors to carefully dissect the surface vasculature. Following the draining veins the AVM nidus was isolated from the rest of the cortical brain. This is done methodically and circumferentially until expected remaining feeding artery can be found 1 from the PCA and 3 more from the MCA. The previously on occipital artery and some of the nidal vessels can be seen field with Joe. One of the deeper MCA feeding artery embolized was divided by scissor to disconnect AVM from the side. Similarly one of the PCA feeder that was embolized was also divided. We Sequentially placed temporary clips on the 4 remaining cortical feeding arteries. This resulted in much softening of the AVM nidus.   We also dissected the AVM circumferentially while systematically

## 2023-03-31 NOTE — SIGNIFICANT EVENT
Patient doing well this evening. He is on Precedex 0.2mcg/kg/min. He opens his eyes, nods yes/no appropriately and follows commands. Moving all extremities. Placed on PS/CPAP trial and tolerated well. Cuff leak intact. Extubated to nasal canula. Translation services were used during extubation.       ISA Angeles - CNP  Neuro Critical Care  03/31/23 5:51 PM

## 2023-03-31 NOTE — ANESTHESIA POSTPROCEDURE EVALUATION
Department of Anesthesiology  Postprocedure Note    Patient: Drew Mckeon  MRN: [id number]  [place]: 1975  Date of evaluation: 3/31/2023      Procedure Summary     Date: 03/30/23 Room / Location: [address]    Anesthesia Start: 1207 Anesthesia Stop: 03/31/23 0757    Procedure: PARIETAL OCCIPITAL CRANIOTOMY FOR AVM RESECTION (Left: Head) Diagnosis:       AVM (arteriovenous malformation)      (ARTERIOVENOUS MALFORMATION)    Surgeons: Etelvina Patel DO Responsible Provider: Haley Black MD    Anesthesia Type: general, MAC ASA Status: 2          Anesthesia Type: No value filed.     Patricia Phase I: Patricia Score: 9    Patricia Phase II:        Anesthesia Post Evaluation    Patient location during evaluation: ICU  Patient participation: waiting for patient participation  Level of consciousness: sedated and ventilated  Pain score: 0  Airway patency: patent  Nausea & Vomiting: no vomiting and no nausea  Complications: no  Cardiovascular status: hemodynamically stable  Respiratory status: ventilator and intubated  Hydration status: stable

## 2023-04-01 ENCOUNTER — APPOINTMENT (OUTPATIENT)
Dept: GENERAL RADIOLOGY | Age: 48
End: 2023-04-01
Attending: PSYCHIATRY & NEUROLOGY
Payer: COMMERCIAL

## 2023-04-01 PROBLEM — I72.9 ANEURYSM (HCC): Status: ACTIVE | Noted: 2023-04-01

## 2023-04-01 LAB
ABSOLUTE EOS #: <0.03 K/UL (ref 0–0.44)
ABSOLUTE IMMATURE GRANULOCYTE: 0.34 K/UL (ref 0–0.3)
ABSOLUTE LYMPH #: 1.2 K/UL (ref 1.1–3.7)
ABSOLUTE MONO #: 1.43 K/UL (ref 0.1–1.2)
ANION GAP SERPL CALCULATED.3IONS-SCNC: 8 MMOL/L (ref 9–17)
BASOPHILS # BLD: 0 % (ref 0–2)
BASOPHILS ABSOLUTE: 0.03 K/UL (ref 0–0.2)
BUN SERPL-MCNC: 29 MG/DL (ref 6–20)
CALCIUM SERPL-MCNC: 7.5 MG/DL (ref 8.6–10.4)
CHLORIDE SERPL-SCNC: 113 MMOL/L (ref 98–107)
CO2 SERPL-SCNC: 22 MMOL/L (ref 20–31)
CREAT SERPL-MCNC: 0.72 MG/DL (ref 0.7–1.2)
EOSINOPHILS RELATIVE PERCENT: 0 % (ref 1–4)
GFR SERPL CREATININE-BSD FRML MDRD: >60 ML/MIN/1.73M2
GLUCOSE BLD-MCNC: 125 MG/DL (ref 75–110)
GLUCOSE BLD-MCNC: 126 MG/DL (ref 75–110)
GLUCOSE BLD-MCNC: 144 MG/DL (ref 75–110)
GLUCOSE BLD-MCNC: 149 MG/DL (ref 75–110)
GLUCOSE BLD-MCNC: 155 MG/DL (ref 75–110)
GLUCOSE SERPL-MCNC: 147 MG/DL (ref 70–99)
HCT VFR BLD AUTO: 26.4 % (ref 40.7–50.3)
HGB BLD-MCNC: 8.6 G/DL (ref 13–17)
IMMATURE GRANULOCYTES: 2 %
LYMPHOCYTES # BLD: 7 % (ref 24–43)
MCH RBC QN AUTO: 29.3 PG (ref 25.2–33.5)
MCHC RBC AUTO-ENTMCNC: 32.6 G/DL (ref 28.4–34.8)
MCV RBC AUTO: 89.8 FL (ref 82.6–102.9)
MONOCYTES # BLD: 9 % (ref 3–12)
NRBC AUTOMATED: 0.2 PER 100 WBC
PDW BLD-RTO: 14.8 % (ref 11.8–14.4)
PLATELET # BLD AUTO: ABNORMAL K/UL (ref 138–453)
PLATELET, FLUORESCENCE: 136 K/UL (ref 138–453)
PLATELET, IMMATURE FRACTION: 9.2 % (ref 1.1–10.3)
POTASSIUM SERPL-SCNC: 3.8 MMOL/L (ref 3.7–5.3)
RBC # BLD: 2.94 M/UL (ref 4.21–5.77)
RBC # BLD: ABNORMAL 10*6/UL
SEG NEUTROPHILS: 82 % (ref 36–65)
SEGMENTED NEUTROPHILS ABSOLUTE COUNT: 13.35 K/UL (ref 1.5–8.1)
SODIUM SERPL-SCNC: 143 MMOL/L (ref 135–144)
WBC # BLD AUTO: 16.4 K/UL (ref 3.5–11.3)

## 2023-04-01 PROCEDURE — 85055 RETICULATED PLATELET ASSAY: CPT

## 2023-04-01 PROCEDURE — 80048 BASIC METABOLIC PNL TOTAL CA: CPT

## 2023-04-01 PROCEDURE — 2000000000 HC ICU R&B

## 2023-04-01 PROCEDURE — 2580000003 HC RX 258: Performed by: NURSE PRACTITIONER

## 2023-04-01 PROCEDURE — 6360000002 HC RX W HCPCS: Performed by: NURSE PRACTITIONER

## 2023-04-01 PROCEDURE — 85025 COMPLETE CBC W/AUTO DIFF WBC: CPT

## 2023-04-01 PROCEDURE — 71045 X-RAY EXAM CHEST 1 VIEW: CPT

## 2023-04-01 PROCEDURE — C9113 INJ PANTOPRAZOLE SODIUM, VIA: HCPCS | Performed by: NURSE PRACTITIONER

## 2023-04-01 PROCEDURE — 6370000000 HC RX 637 (ALT 250 FOR IP): Performed by: NURSE PRACTITIONER

## 2023-04-01 PROCEDURE — 6370000000 HC RX 637 (ALT 250 FOR IP): Performed by: STUDENT IN AN ORGANIZED HEALTH CARE EDUCATION/TRAINING PROGRAM

## 2023-04-01 PROCEDURE — 99232 SBSQ HOSP IP/OBS MODERATE 35: CPT | Performed by: PSYCHIATRY & NEUROLOGY

## 2023-04-01 PROCEDURE — 36415 COLL VENOUS BLD VENIPUNCTURE: CPT

## 2023-04-01 PROCEDURE — 6360000002 HC RX W HCPCS: Performed by: STUDENT IN AN ORGANIZED HEALTH CARE EDUCATION/TRAINING PROGRAM

## 2023-04-01 PROCEDURE — APPSS45 APP SPLIT SHARED TIME 31-45 MINUTES: Performed by: REGISTERED NURSE

## 2023-04-01 RX ORDER — DEXAMETHASONE SODIUM PHOSPHATE 4 MG/ML
2 INJECTION, SOLUTION INTRA-ARTICULAR; INTRALESIONAL; INTRAMUSCULAR; INTRAVENOUS; SOFT TISSUE EVERY 12 HOURS
Status: COMPLETED | OUTPATIENT
Start: 2023-04-01 | End: 2023-04-02

## 2023-04-01 RX ADMIN — Medication 2000 MG: at 05:16

## 2023-04-01 RX ADMIN — OXYCODONE 5 MG: 5 TABLET ORAL at 06:04

## 2023-04-01 RX ADMIN — ACETAMINOPHEN 650 MG: 325 TABLET ORAL at 20:37

## 2023-04-01 RX ADMIN — HYPROMELLOSE 2910 1 DROP: 5 SOLUTION OPHTHALMIC at 18:16

## 2023-04-01 RX ADMIN — HYPROMELLOSE 2910 1 DROP: 5 SOLUTION OPHTHALMIC at 13:19

## 2023-04-01 RX ADMIN — DEXAMETHASONE SODIUM PHOSPHATE 2 MG: 4 INJECTION, SOLUTION INTRAMUSCULAR; INTRAVENOUS at 23:43

## 2023-04-01 RX ADMIN — ENOXAPARIN SODIUM 40 MG: 100 INJECTION SUBCUTANEOUS at 09:46

## 2023-04-01 RX ADMIN — OXYCODONE 5 MG: 5 TABLET ORAL at 13:58

## 2023-04-01 RX ADMIN — SODIUM CHLORIDE, PRESERVATIVE FREE 40 MG: 5 INJECTION INTRAVENOUS at 09:47

## 2023-04-01 RX ADMIN — LEVETIRACETAM 500 MG: 100 INJECTION, SOLUTION INTRAVENOUS at 03:36

## 2023-04-01 RX ADMIN — LEVETIRACETAM 500 MG: 100 INJECTION, SOLUTION INTRAVENOUS at 15:56

## 2023-04-01 RX ADMIN — DEXAMETHASONE SODIUM PHOSPHATE 4 MG: 4 INJECTION, SOLUTION INTRAMUSCULAR; INTRAVENOUS at 09:47

## 2023-04-01 RX ADMIN — OXYCODONE 5 MG: 5 TABLET ORAL at 18:15

## 2023-04-01 RX ADMIN — ACETAMINOPHEN 650 MG: 325 TABLET ORAL at 00:01

## 2023-04-01 RX ADMIN — DEXAMETHASONE SODIUM PHOSPHATE 4 MG: 4 INJECTION, SOLUTION INTRAMUSCULAR; INTRAVENOUS at 02:19

## 2023-04-01 RX ADMIN — SODIUM CHLORIDE, PRESERVATIVE FREE 10 ML: 5 INJECTION INTRAVENOUS at 09:47

## 2023-04-01 RX ADMIN — SODIUM CHLORIDE: 9 INJECTION, SOLUTION INTRAVENOUS at 13:59

## 2023-04-01 RX ADMIN — SODIUM CHLORIDE, PRESERVATIVE FREE 5 ML: 5 INJECTION INTRAVENOUS at 20:37

## 2023-04-01 ASSESSMENT — PAIN SCALES - GENERAL
PAINLEVEL_OUTOF10: 7
PAINLEVEL_OUTOF10: 5
PAINLEVEL_OUTOF10: 5
PAINLEVEL_OUTOF10: 6
PAINLEVEL_OUTOF10: 6

## 2023-04-01 ASSESSMENT — PAIN DESCRIPTION - LOCATION
LOCATION: HEAD

## 2023-04-01 NOTE — PROCEDURES
Referring physician:  Date: 4/1/2023  Start Time:3/31/2023 @ 1500  End Time:    Indication  Patient with encephalopathy, EEG done to rule out subclinical seizures. Introduction  This continuous video-EEG was acquired using a [employer] workstation at 256 samples/s. Electrodes were placed according to the International 10-20 system. Automated spike and seizure detection algorithms were applied. Video was recorded during this study. Description  The background consistent of diffuse none reactive polymorphic delta and theta slowing with brief periods of attenuation. . There is higher amplitude and more disorganized rhythm over left hemisphere suggest breach rhythm. Left hemispheric  focal slowing or interhemispheric asymmetry was noted. Normal sleep structures were observed. There were no interictal epileptiform discharges or electrographic seizures. Events  No events reported. Impression  Abnormal continuous vEEG recording, the slowing mentioned above suggests moderate non specific encephalopathy. Interhemispheric asymmetry suggest focal lesion on left hemisphere. No epileptiform discharges were identified. Please note the absence of   such activity in this record cannot conclusively rule out an epileptic   disorder. If such is still clinically suspected, a repeat study with   prolonged sampling may be helpful. Jeovanny Headley MD  Epilepsy Board Certified. Neurology Board Certified.     Electronically Signed

## 2023-04-02 ENCOUNTER — APPOINTMENT (OUTPATIENT)
Dept: CT IMAGING | Age: 48
End: 2023-04-02
Attending: PSYCHIATRY & NEUROLOGY
Payer: COMMERCIAL

## 2023-04-02 LAB
ABSOLUTE EOS #: 0 K/UL (ref 0–0.44)
ABSOLUTE IMMATURE GRANULOCYTE: 0.68 K/UL (ref 0–0.3)
ABSOLUTE LYMPH #: 1.86 K/UL (ref 1.1–3.7)
ABSOLUTE MONO #: 1.86 K/UL (ref 0.1–1.2)
ANION GAP SERPL CALCULATED.3IONS-SCNC: 8 MMOL/L (ref 9–17)
BASOPHILS # BLD: 0 % (ref 0–2)
BASOPHILS ABSOLUTE: 0 K/UL (ref 0–0.2)
BUN SERPL-MCNC: 23 MG/DL (ref 6–20)
CALCIUM SERPL-MCNC: 7.8 MG/DL (ref 8.6–10.4)
CHLORIDE SERPL-SCNC: 111 MMOL/L (ref 98–107)
CO2 SERPL-SCNC: 22 MMOL/L (ref 20–31)
CREAT SERPL-MCNC: 0.66 MG/DL (ref 0.7–1.2)
EOSINOPHILS RELATIVE PERCENT: 0 % (ref 1–4)
GFR SERPL CREATININE-BSD FRML MDRD: >60 ML/MIN/1.73M2
GLUCOSE BLD-MCNC: 122 MG/DL (ref 75–110)
GLUCOSE BLD-MCNC: 138 MG/DL (ref 75–110)
GLUCOSE SERPL-MCNC: 141 MG/DL (ref 70–99)
HCT VFR BLD AUTO: 25.2 % (ref 40.7–50.3)
HGB BLD-MCNC: 8.1 G/DL (ref 13–17)
IMMATURE GRANULOCYTES: 4 %
LYMPHOCYTES # BLD: 11 % (ref 24–43)
MCH RBC QN AUTO: 29.2 PG (ref 25.2–33.5)
MCHC RBC AUTO-ENTMCNC: 32.1 G/DL (ref 28.4–34.8)
MCV RBC AUTO: 91 FL (ref 82.6–102.9)
MONOCYTES # BLD: 11 % (ref 3–12)
MORPHOLOGY: ABNORMAL
NRBC AUTOMATED: 0.5 PER 100 WBC
PDW BLD-RTO: 14.5 % (ref 11.8–14.4)
PLATELET # BLD AUTO: ABNORMAL K/UL (ref 138–453)
PLATELET, FLUORESCENCE: 152 K/UL (ref 138–453)
PLATELET, IMMATURE FRACTION: 8.6 % (ref 1.1–10.3)
POTASSIUM SERPL-SCNC: 3.7 MMOL/L (ref 3.7–5.3)
RBC # BLD: 2.77 M/UL (ref 4.21–5.77)
SEG NEUTROPHILS: 74 % (ref 36–65)
SEGMENTED NEUTROPHILS ABSOLUTE COUNT: 12.5 K/UL (ref 1.5–8.1)
SODIUM SERPL-SCNC: 141 MMOL/L (ref 135–144)
WBC # BLD AUTO: 16.9 K/UL (ref 3.5–11.3)

## 2023-04-02 PROCEDURE — 6360000002 HC RX W HCPCS: Performed by: FAMILY MEDICINE

## 2023-04-02 PROCEDURE — 85025 COMPLETE CBC W/AUTO DIFF WBC: CPT

## 2023-04-02 PROCEDURE — 2580000003 HC RX 258: Performed by: NURSE PRACTITIONER

## 2023-04-02 PROCEDURE — 2500000003 HC RX 250 WO HCPCS: Performed by: STUDENT IN AN ORGANIZED HEALTH CARE EDUCATION/TRAINING PROGRAM

## 2023-04-02 PROCEDURE — 70450 CT HEAD/BRAIN W/O DYE: CPT

## 2023-04-02 PROCEDURE — 85055 RETICULATED PLATELET ASSAY: CPT

## 2023-04-02 PROCEDURE — 6360000002 HC RX W HCPCS: Performed by: STUDENT IN AN ORGANIZED HEALTH CARE EDUCATION/TRAINING PROGRAM

## 2023-04-02 PROCEDURE — 6370000000 HC RX 637 (ALT 250 FOR IP): Performed by: STUDENT IN AN ORGANIZED HEALTH CARE EDUCATION/TRAINING PROGRAM

## 2023-04-02 PROCEDURE — 6370000000 HC RX 637 (ALT 250 FOR IP): Performed by: NURSE PRACTITIONER

## 2023-04-02 PROCEDURE — 82947 ASSAY GLUCOSE BLOOD QUANT: CPT

## 2023-04-02 PROCEDURE — 80048 BASIC METABOLIC PNL TOTAL CA: CPT

## 2023-04-02 PROCEDURE — 6360000002 HC RX W HCPCS: Performed by: NURSE PRACTITIONER

## 2023-04-02 PROCEDURE — 99232 SBSQ HOSP IP/OBS MODERATE 35: CPT | Performed by: PSYCHIATRY & NEUROLOGY

## 2023-04-02 PROCEDURE — 2580000003 HC RX 258: Performed by: FAMILY MEDICINE

## 2023-04-02 PROCEDURE — 36415 COLL VENOUS BLD VENIPUNCTURE: CPT

## 2023-04-02 PROCEDURE — C9113 INJ PANTOPRAZOLE SODIUM, VIA: HCPCS | Performed by: NURSE PRACTITIONER

## 2023-04-02 PROCEDURE — 2000000000 HC ICU R&B

## 2023-04-02 RX ORDER — METOCLOPRAMIDE HYDROCHLORIDE 5 MG/ML
10 INJECTION INTRAMUSCULAR; INTRAVENOUS
Status: COMPLETED | OUTPATIENT
Start: 2023-04-02 | End: 2023-04-02

## 2023-04-02 RX ORDER — TOPIRAMATE 25 MG/1
50 TABLET ORAL DAILY
Status: DISCONTINUED | OUTPATIENT
Start: 2023-04-02 | End: 2023-04-07 | Stop reason: HOSPADM

## 2023-04-02 RX ORDER — CALCIUM GLUCONATE 20 MG/ML
1000 INJECTION, SOLUTION INTRAVENOUS ONCE
Status: COMPLETED | OUTPATIENT
Start: 2023-04-02 | End: 2023-04-02

## 2023-04-02 RX ORDER — DEXAMETHASONE SODIUM PHOSPHATE 4 MG/ML
4 INJECTION, SOLUTION INTRA-ARTICULAR; INTRALESIONAL; INTRAMUSCULAR; INTRAVENOUS; SOFT TISSUE EVERY 6 HOURS
Status: DISCONTINUED | OUTPATIENT
Start: 2023-04-02 | End: 2023-04-05

## 2023-04-02 RX ADMIN — SODIUM CHLORIDE: 9 INJECTION, SOLUTION INTRAVENOUS at 23:42

## 2023-04-02 RX ADMIN — ENOXAPARIN SODIUM 40 MG: 100 INJECTION SUBCUTANEOUS at 09:31

## 2023-04-02 RX ADMIN — TOPIRAMATE 50 MG: 25 TABLET, FILM COATED ORAL at 14:16

## 2023-04-02 RX ADMIN — HYPROMELLOSE 2910 1 DROP: 5 SOLUTION OPHTHALMIC at 23:46

## 2023-04-02 RX ADMIN — SODIUM CHLORIDE, PRESERVATIVE FREE 10 ML: 5 INJECTION INTRAVENOUS at 09:32

## 2023-04-02 RX ADMIN — HYPROMELLOSE 2910 1 DROP: 5 SOLUTION OPHTHALMIC at 06:30

## 2023-04-02 RX ADMIN — LEVETIRACETAM 500 MG: 100 INJECTION, SOLUTION INTRAVENOUS at 14:16

## 2023-04-02 RX ADMIN — ONDANSETRON 4 MG: 2 INJECTION INTRAMUSCULAR; INTRAVENOUS at 13:46

## 2023-04-02 RX ADMIN — OXYCODONE 5 MG: 5 TABLET ORAL at 10:54

## 2023-04-02 RX ADMIN — SODIUM CHLORIDE, PRESERVATIVE FREE 5 ML: 5 INJECTION INTRAVENOUS at 21:00

## 2023-04-02 RX ADMIN — SODIUM CHLORIDE: 9 INJECTION, SOLUTION INTRAVENOUS at 11:39

## 2023-04-02 RX ADMIN — METOCLOPRAMIDE 10 MG: 5 INJECTION, SOLUTION INTRAMUSCULAR; INTRAVENOUS at 14:20

## 2023-04-02 RX ADMIN — DEXAMETHASONE SODIUM PHOSPHATE 4 MG: 4 INJECTION, SOLUTION INTRAMUSCULAR; INTRAVENOUS at 23:47

## 2023-04-02 RX ADMIN — LEVETIRACETAM 500 MG: 100 INJECTION, SOLUTION INTRAVENOUS at 04:00

## 2023-04-02 RX ADMIN — DEXAMETHASONE SODIUM PHOSPHATE 4 MG: 4 INJECTION, SOLUTION INTRAMUSCULAR; INTRAVENOUS at 18:18

## 2023-04-02 RX ADMIN — DEXAMETHASONE SODIUM PHOSPHATE 2 MG: 4 INJECTION, SOLUTION INTRAMUSCULAR; INTRAVENOUS at 09:31

## 2023-04-02 RX ADMIN — CALCIUM GLUCONATE 1000 MG: 20 INJECTION, SOLUTION INTRAVENOUS at 09:44

## 2023-04-02 RX ADMIN — HYPROMELLOSE 2910 1 DROP: 5 SOLUTION OPHTHALMIC at 13:47

## 2023-04-02 RX ADMIN — OXYCODONE 5 MG: 5 TABLET ORAL at 06:29

## 2023-04-02 RX ADMIN — SODIUM CHLORIDE, PRESERVATIVE FREE 40 MG: 5 INJECTION INTRAVENOUS at 09:31

## 2023-04-02 ASSESSMENT — PAIN SCALES - GENERAL
PAINLEVEL_OUTOF10: 7
PAINLEVEL_OUTOF10: 6

## 2023-04-02 ASSESSMENT — PAIN DESCRIPTION - LOCATION: LOCATION: HAND

## 2023-04-03 ENCOUNTER — APPOINTMENT (OUTPATIENT)
Dept: INTERVENTIONAL RADIOLOGY/VASCULAR | Age: 48
End: 2023-04-03
Attending: PSYCHIATRY & NEUROLOGY
Payer: COMMERCIAL

## 2023-04-03 ENCOUNTER — APPOINTMENT (OUTPATIENT)
Dept: CT IMAGING | Age: 48
End: 2023-04-03
Attending: PSYCHIATRY & NEUROLOGY
Payer: COMMERCIAL

## 2023-04-03 LAB
ABSOLUTE EOS #: 0 K/UL (ref 0–0.4)
ABSOLUTE IMMATURE GRANULOCYTE: 0.17 K/UL (ref 0–0.3)
ABSOLUTE LYMPH #: 1.66 K/UL (ref 1–4.8)
ABSOLUTE MONO #: 0.83 K/UL (ref 0.1–0.8)
ANION GAP SERPL CALCULATED.3IONS-SCNC: 9 MMOL/L (ref 9–17)
BASOPHILS # BLD: 0 % (ref 0–2)
BASOPHILS ABSOLUTE: 0 K/UL (ref 0–0.2)
BUN SERPL-MCNC: 17 MG/DL (ref 6–20)
CALCIUM SERPL-MCNC: 8 MG/DL (ref 8.6–10.4)
CHLORIDE SERPL-SCNC: 106 MMOL/L (ref 98–107)
CO2 SERPL-SCNC: 22 MMOL/L (ref 20–31)
CREAT SERPL-MCNC: 0.63 MG/DL (ref 0.7–1.2)
EOSINOPHILS RELATIVE PERCENT: 0 % (ref 1–4)
GFR SERPL CREATININE-BSD FRML MDRD: >60 ML/MIN/1.73M2
GLUCOSE BLD-MCNC: 122 MG/DL (ref 75–110)
GLUCOSE BLD-MCNC: 126 MG/DL (ref 75–110)
GLUCOSE BLD-MCNC: 140 MG/DL (ref 75–110)
GLUCOSE BLD-MCNC: 140 MG/DL (ref 75–110)
GLUCOSE BLD-MCNC: 141 MG/DL (ref 75–110)
GLUCOSE SERPL-MCNC: 139 MG/DL (ref 70–99)
HCT VFR BLD AUTO: 26.3 % (ref 40.7–50.3)
HGB BLD-MCNC: 8.7 G/DL (ref 13–17)
IMMATURE GRANULOCYTES: 1 %
LYMPHOCYTES # BLD: 10 % (ref 24–44)
MCH RBC QN AUTO: 29.4 PG (ref 25.2–33.5)
MCHC RBC AUTO-ENTMCNC: 33.1 G/DL (ref 28.4–34.8)
MCV RBC AUTO: 88.9 FL (ref 82.6–102.9)
MONOCYTES # BLD: 5 % (ref 1–7)
MORPHOLOGY: NORMAL
NRBC AUTOMATED: 0.5 PER 100 WBC
PDW BLD-RTO: 14 % (ref 11.8–14.4)
PLATELET # BLD AUTO: 152 K/UL (ref 138–453)
PMV BLD AUTO: 11.1 FL (ref 8.1–13.5)
POTASSIUM SERPL-SCNC: 3.9 MMOL/L (ref 3.7–5.3)
RBC # BLD: 2.96 M/UL (ref 4.21–5.77)
SEG NEUTROPHILS: 84 % (ref 36–66)
SEGMENTED NEUTROPHILS ABSOLUTE COUNT: 13.94 K/UL (ref 1.8–7.7)
SODIUM SERPL-SCNC: 137 MMOL/L (ref 135–144)
SURGICAL PATHOLOGY REPORT: NORMAL
WBC # BLD AUTO: 16.6 K/UL (ref 3.5–11.3)

## 2023-04-03 PROCEDURE — 6360000002 HC RX W HCPCS: Performed by: NURSE PRACTITIONER

## 2023-04-03 PROCEDURE — 2580000003 HC RX 258: Performed by: STUDENT IN AN ORGANIZED HEALTH CARE EDUCATION/TRAINING PROGRAM

## 2023-04-03 PROCEDURE — 6360000002 HC RX W HCPCS: Performed by: FAMILY MEDICINE

## 2023-04-03 PROCEDURE — C1887 CATHETER, GUIDING: HCPCS

## 2023-04-03 PROCEDURE — 36224 PLACE CATH CAROTD ART: CPT

## 2023-04-03 PROCEDURE — 6370000000 HC RX 637 (ALT 250 FOR IP): Performed by: NURSE PRACTITIONER

## 2023-04-03 PROCEDURE — 2709999900 HC NON-CHARGEABLE SUPPLY

## 2023-04-03 PROCEDURE — 80048 BASIC METABOLIC PNL TOTAL CA: CPT

## 2023-04-03 PROCEDURE — 2000000000 HC ICU R&B

## 2023-04-03 PROCEDURE — 99153 MOD SED SAME PHYS/QHP EA: CPT

## 2023-04-03 PROCEDURE — 99232 SBSQ HOSP IP/OBS MODERATE 35: CPT | Performed by: PSYCHIATRY & NEUROLOGY

## 2023-04-03 PROCEDURE — 6360000004 HC RX CONTRAST MEDICATION

## 2023-04-03 PROCEDURE — 36227 PLACE CATH XTRNL CAROTID: CPT

## 2023-04-03 PROCEDURE — C1760 CLOSURE DEV, VASC: HCPCS

## 2023-04-03 PROCEDURE — C1769 GUIDE WIRE: HCPCS

## 2023-04-03 PROCEDURE — 2580000003 HC RX 258: Performed by: NURSE PRACTITIONER

## 2023-04-03 PROCEDURE — C1894 INTRO/SHEATH, NON-LASER: HCPCS

## 2023-04-03 PROCEDURE — 2580000003 HC RX 258: Performed by: FAMILY MEDICINE

## 2023-04-03 PROCEDURE — 99152 MOD SED SAME PHYS/QHP 5/>YRS: CPT

## 2023-04-03 PROCEDURE — 6360000002 HC RX W HCPCS: Performed by: STUDENT IN AN ORGANIZED HEALTH CARE EDUCATION/TRAINING PROGRAM

## 2023-04-03 PROCEDURE — 36226 PLACE CATH VERTEBRAL ART: CPT

## 2023-04-03 PROCEDURE — 82947 ASSAY GLUCOSE BLOOD QUANT: CPT

## 2023-04-03 PROCEDURE — 6370000000 HC RX 637 (ALT 250 FOR IP): Performed by: STUDENT IN AN ORGANIZED HEALTH CARE EDUCATION/TRAINING PROGRAM

## 2023-04-03 PROCEDURE — 36415 COLL VENOUS BLD VENIPUNCTURE: CPT

## 2023-04-03 PROCEDURE — C9113 INJ PANTOPRAZOLE SODIUM, VIA: HCPCS | Performed by: NURSE PRACTITIONER

## 2023-04-03 PROCEDURE — 70450 CT HEAD/BRAIN W/O DYE: CPT

## 2023-04-03 PROCEDURE — 85025 COMPLETE CBC W/AUTO DIFF WBC: CPT

## 2023-04-03 RX ORDER — FENTANYL CITRATE 50 UG/ML
INJECTION, SOLUTION INTRAMUSCULAR; INTRAVENOUS PRN
Status: COMPLETED | OUTPATIENT
Start: 2023-04-03 | End: 2023-04-03

## 2023-04-03 RX ORDER — PANTOPRAZOLE SODIUM 40 MG/1
40 TABLET, DELAYED RELEASE ORAL
Status: DISCONTINUED | OUTPATIENT
Start: 2023-04-04 | End: 2023-04-07 | Stop reason: HOSPADM

## 2023-04-03 RX ORDER — SENNA AND DOCUSATE SODIUM 50; 8.6 MG/1; MG/1
2 TABLET, FILM COATED ORAL DAILY
Status: DISCONTINUED | OUTPATIENT
Start: 2023-04-03 | End: 2023-04-07 | Stop reason: HOSPADM

## 2023-04-03 RX ORDER — ASPIRIN 81 MG/1
81 TABLET, CHEWABLE ORAL DAILY
Status: DISCONTINUED | OUTPATIENT
Start: 2023-04-03 | End: 2023-04-07 | Stop reason: HOSPADM

## 2023-04-03 RX ORDER — IODIXANOL 320 MG/ML
100 INJECTION, SOLUTION INTRAVASCULAR
Status: COMPLETED | OUTPATIENT
Start: 2023-04-03 | End: 2023-04-03

## 2023-04-03 RX ORDER — MIDAZOLAM HYDROCHLORIDE 2 MG/2ML
INJECTION, SOLUTION INTRAMUSCULAR; INTRAVENOUS PRN
Status: COMPLETED | OUTPATIENT
Start: 2023-04-03 | End: 2023-04-03

## 2023-04-03 RX ORDER — 0.9 % SODIUM CHLORIDE 0.9 %
500 INTRAVENOUS SOLUTION INTRAVENOUS ONCE
Status: DISCONTINUED | OUTPATIENT
Start: 2023-04-03 | End: 2023-04-07 | Stop reason: HOSPADM

## 2023-04-03 RX ADMIN — SODIUM CHLORIDE, PRESERVATIVE FREE 10 ML: 5 INJECTION INTRAVENOUS at 11:37

## 2023-04-03 RX ADMIN — SODIUM CHLORIDE: 9 INJECTION, SOLUTION INTRAVENOUS at 05:30

## 2023-04-03 RX ADMIN — DEXAMETHASONE SODIUM PHOSPHATE 4 MG: 4 INJECTION, SOLUTION INTRAMUSCULAR; INTRAVENOUS at 05:29

## 2023-04-03 RX ADMIN — ONDANSETRON 4 MG: 2 INJECTION INTRAMUSCULAR; INTRAVENOUS at 07:28

## 2023-04-03 RX ADMIN — MIDAZOLAM HYDROCHLORIDE 1 MG: 1 INJECTION, SOLUTION INTRAMUSCULAR; INTRAVENOUS at 09:56

## 2023-04-03 RX ADMIN — OXYCODONE 5 MG: 5 TABLET ORAL at 14:30

## 2023-04-03 RX ADMIN — DEXAMETHASONE SODIUM PHOSPHATE 4 MG: 4 INJECTION, SOLUTION INTRAMUSCULAR; INTRAVENOUS at 18:12

## 2023-04-03 RX ADMIN — ASPIRIN 81 MG: 81 TABLET, CHEWABLE ORAL at 21:43

## 2023-04-03 RX ADMIN — TOPIRAMATE 50 MG: 25 TABLET, FILM COATED ORAL at 11:37

## 2023-04-03 RX ADMIN — LEVETIRACETAM 500 MG: 100 INJECTION, SOLUTION INTRAVENOUS at 03:15

## 2023-04-03 RX ADMIN — LEVETIRACETAM 500 MG: 100 INJECTION, SOLUTION INTRAVENOUS at 14:30

## 2023-04-03 RX ADMIN — ENOXAPARIN SODIUM 40 MG: 100 INJECTION SUBCUTANEOUS at 14:30

## 2023-04-03 RX ADMIN — FENTANYL CITRATE 50 MCG: 50 INJECTION, SOLUTION INTRAMUSCULAR; INTRAVENOUS at 09:56

## 2023-04-03 RX ADMIN — HYPROMELLOSE 2910 1 DROP: 5 SOLUTION OPHTHALMIC at 11:32

## 2023-04-03 RX ADMIN — DOCUSATE SODIUM 50 MG AND SENNOSIDES 8.6 MG 2 TABLET: 8.6; 5 TABLET, FILM COATED ORAL at 21:43

## 2023-04-03 RX ADMIN — SODIUM CHLORIDE, PRESERVATIVE FREE 40 MG: 5 INJECTION INTRAVENOUS at 11:32

## 2023-04-03 RX ADMIN — SODIUM CHLORIDE, PRESERVATIVE FREE 10 ML: 5 INJECTION INTRAVENOUS at 19:44

## 2023-04-03 RX ADMIN — HYPROMELLOSE 2910 1 DROP: 5 SOLUTION OPHTHALMIC at 05:37

## 2023-04-03 RX ADMIN — OXYCODONE 5 MG: 5 TABLET ORAL at 19:42

## 2023-04-03 RX ADMIN — DEXAMETHASONE SODIUM PHOSPHATE 4 MG: 4 INJECTION, SOLUTION INTRAMUSCULAR; INTRAVENOUS at 23:22

## 2023-04-03 RX ADMIN — IODIXANOL 78 ML: 320 INJECTION, SOLUTION INTRAVASCULAR at 11:11

## 2023-04-03 RX ADMIN — OXYCODONE 5 MG: 5 TABLET ORAL at 03:15

## 2023-04-03 RX ADMIN — Medication 500 ML: at 12:29

## 2023-04-03 RX ADMIN — CEFAZOLIN SODIUM 2000 MG: 500 INJECTION, POWDER, FOR SOLUTION INTRAMUSCULAR; INTRAVENOUS at 10:03

## 2023-04-03 RX ADMIN — DEXAMETHASONE SODIUM PHOSPHATE 4 MG: 4 INJECTION, SOLUTION INTRAMUSCULAR; INTRAVENOUS at 13:47

## 2023-04-03 ASSESSMENT — PAIN DESCRIPTION - FREQUENCY
FREQUENCY: CONTINUOUS
FREQUENCY: INTERMITTENT
FREQUENCY: CONTINUOUS

## 2023-04-03 ASSESSMENT — PAIN DESCRIPTION - ONSET
ONSET: ON-GOING
ONSET: AWAKENED FROM SLEEP
ONSET: ON-GOING

## 2023-04-03 ASSESSMENT — PAIN SCALES - GENERAL
PAINLEVEL_OUTOF10: 7
PAINLEVEL_OUTOF10: 2
PAINLEVEL_OUTOF10: 4
PAINLEVEL_OUTOF10: 7
PAINLEVEL_OUTOF10: 7
PAINLEVEL_OUTOF10: 6
PAINLEVEL_OUTOF10: 7

## 2023-04-03 ASSESSMENT — PAIN DESCRIPTION - ORIENTATION
ORIENTATION: LEFT

## 2023-04-03 ASSESSMENT — PAIN DESCRIPTION - PAIN TYPE
TYPE: ACUTE PAIN

## 2023-04-03 ASSESSMENT — PAIN DESCRIPTION - LOCATION
LOCATION: HEAD

## 2023-04-03 ASSESSMENT — PAIN DESCRIPTION - DESCRIPTORS
DESCRIPTORS: ACHING

## 2023-04-03 NOTE — OR NURSING
PATIENT'S CO2 END TIDAL VOLUME WAS BETWEEN 28 TO 33 FOR ENTIRE PROCEDURE.  [facility]

## 2023-04-03 NOTE — BRIEF OP NOTE
Brief Postoperative Note                  [facility]    NEUROENDOVASCULAR SERVICE: POST-OP NOTE: 4/3/2023    Pt Name: Marisela Geiger  MRN: [id number]  [place]: 1975  Date of Procedure: 4/3/2023  Primary Care Physician: JUWAN Foster        Pre-Procedural Diagnosis:Left [id number]-ncvgrqrwm AVM post dylan embolization and left avm resection  Post-Procedural Diagnosis:Same as above       Procedure Performed:Diagnostic Cerebral Angiogram    Surgeon:   Dwight Brittle, MD    Fellow:  Tash Crespo MD and Jessica Mims MD     Assisting Tech:  Kirsten Cade    PRE-PROCEDURAL EXAM:  Prestroke baseline mRS MODIFIED ISRAEL SCORE: 0 - No symptoms at all. Neurological exam performed and unchanged from initial H&P or consult      Anesthesia: IV Moderate Sedation  Complications: none    Intra-Operative EXAM:  Neurological exam performed and unchanged from initial H&P or consult    EBL: < less than 50       Cc            Specimens: Were not Obtained  Contrast:     Visipaque 320 low osmolar 78 Cc             Fluoro: 13.9 min    Findings:  Please see dictated Radiology note for further details  There was no evidence of early venous drainage, previously noted nidus or dilated cortical veins  Expected contrast stagnation in the distal inferior MCA division   No evidence of previously noted dilated cortical veins suggestive of possible post-surgical thrombosis of the veins (hyperdense thrombosed veins seen on Head CT)  Delayed cortical venous drainage through anterior frontal cortical vein             POST-PROCEDURAL EXAM :   Stable neurological Exam  Neurological exam performed and unchanged from initial H&P or consult    Closure:  right Vascade 5   F        POST-PROCEDURAL MONITORING : see orders  Disposition: Neuro ICU      Recommendations:  Back to Neuro ICU  Do not bend right leg for 3 hours. Groin checks per protocol. Peripheral pulse checks per protocol.   SBP goal 100-140 mm Hg   CT head at
Brief Postoperative Note                  [facility]    NEUROENDOVASCULAR SERVICE: POST-OP NOTE: 3/31/2023    Pt Name: Jarred Gomez  MRN: [id number]  [place]: 1975  Date of Procedure: 3/31/2023  Primary Care Physician: JUWAN Pearce        Pre-Procedural Diagnosis:Left vtvrxbx-ynwmdwb-lcfkoxzas AVM post dylan embolization and left avm resection  Post-Procedural Diagnosis:Same as above       Procedure Performed:Diagnostic Intraoperative Cerebral Angiogram    Surgeon:   Shamika Bush MD    Fellow:  Hailey Sanchez MD     Assisting Tech:  Génesis Maurice    PRE-PROCEDURAL EXAM:  Prestroke baseline mRS MODIFIED ISRAEL SCORE: 5 - Severe disability:  bedridden, incontinent and requiring constant nursing care and attention. Neurological exam performed and unchanged from initial H&P or consult      Anesthesia: General Anesthesia  Complications: none    Intra-Operative EXAM:  Patient sedated with unchanged limited neurological exam    EBL: < less than 100       Cc            Specimens: Were not Obtained  Contrast:     Visipaque 320 low osmolar 87 Cc             Fluoro: 6.3 min    Findings:  Please see dictated Radiology note for further details  Poor image quality secondary to metal artifacts and C-arm machine   Post-surgical changes noted with visualized superior and inferior division of the left MCA. Improved opacification of the left anterior cerebral artery. There was no evidence of early venous drainage, previously noted nidus and dilated cortical veins, within limits of the image quality. POST-PROCEDURAL EXAM :   Stable neurological Exam  Neurological exam performed and unchanged from initial H&P or consult    Closure:  left Vascade 6   F        POST-PROCEDURAL MONITORING : see orders  Disposition: Neuro ICU      Recommendations:  Back to Neuro ICU after surgery completed  Do not bend left leg for 3 hours. Groin checks per protocol. Peripheral pulse checks per protocol.   SBP goal
Exam  Neurological exam performed and unchanged from initial H&P or consult    Closure:  right Vascade 6   F        POST-PROCEDURAL MONITORING : see orders  Disposition: Neuro ICU      Recommendations:  Back to Neuro ICU  Do not bend right leg for 3 hours. Groin checks per protocol. Peripheral pulse checks per protocol. SBP goal  mm Hg   Further planning with neurosurgery for resection  Follow up with Ismael Graham MD  2 weeks after discharge and Dr. Anastasiia Smith 3 months after discharge.         MD Twyla Alberts MD   Pager [phone]  Stroke, [facility]  [address]  Electronically signed 3/28/2023 at 3:09 PM
MD   Pager [phone]  Stroke, [facility] Stroke [address] [facility]  Electronically signed 3/30/2023 at 11:41 AM
bladder;Lack of dependent loop in tubing;Drainage bag less than half full 03/30/23 0440   Status Draining 03/30/23 0440   Output (mL) 350 mL 03/30/23 0630       [REMOVED] Urinary Catheter 03/28/23 Jenkins-Temperature (Removed)   $ Urethral catheter insertion Inserted for procedure 03/28/23 0912   Catheter Indications Perioperative use for selected surgical procedures 03/29/23 1800   Site Assessment No urethral drainage 03/29/23 1800   Urine Color Yellow 03/29/23 1800   Urine Appearance Clear 03/29/23 1800   Collection Container Standard 03/29/23 1800   Securement Method Leg strap 03/29/23 1800   Catheter Care  Soap and water 03/29/23 0000   Catheter Best Practices  Drainage tube clipped to bed;Catheter secured to thigh; Tamper seal intact; Bag below bladder;Bag not on floor 03/29/23 1800   Status Draining 03/29/23 1800   Output (mL) 200 mL 03/29/23 1849       Findings: complete angiographic cure    Electronically signed by Hayden Jain DO on 3/31/2023 at 8:29 AM

## 2023-04-04 ENCOUNTER — APPOINTMENT (OUTPATIENT)
Dept: MRI IMAGING | Age: 48
End: 2023-04-04
Attending: PSYCHIATRY & NEUROLOGY
Payer: COMMERCIAL

## 2023-04-04 LAB
ABSOLUTE EOS #: 0 K/UL (ref 0–0.4)
ABSOLUTE IMMATURE GRANULOCYTE: 0.47 K/UL (ref 0–0.3)
ABSOLUTE LYMPH #: 1.86 K/UL (ref 1–4.8)
ABSOLUTE MONO #: 0.47 K/UL (ref 0.1–0.8)
ANION GAP SERPL CALCULATED.3IONS-SCNC: 8 MMOL/L (ref 9–17)
BASOPHILS # BLD: 0 % (ref 0–2)
BASOPHILS ABSOLUTE: 0 K/UL (ref 0–0.2)
BUN SERPL-MCNC: 22 MG/DL (ref 6–20)
CALCIUM SERPL-MCNC: 8 MG/DL (ref 8.6–10.4)
CHLORIDE SERPL-SCNC: 107 MMOL/L (ref 98–107)
CO2 SERPL-SCNC: 19 MMOL/L (ref 20–31)
CREAT SERPL-MCNC: 0.6 MG/DL (ref 0.7–1.2)
EOSINOPHILS RELATIVE PERCENT: 0 % (ref 1–4)
GFR SERPL CREATININE-BSD FRML MDRD: >60 ML/MIN/1.73M2
GLUCOSE BLD-MCNC: 124 MG/DL (ref 75–110)
GLUCOSE BLD-MCNC: 141 MG/DL (ref 75–110)
GLUCOSE BLD-MCNC: 151 MG/DL (ref 75–110)
GLUCOSE SERPL-MCNC: 144 MG/DL (ref 70–99)
HCT VFR BLD AUTO: 26.6 % (ref 40.7–50.3)
HGB BLD-MCNC: 8.6 G/DL (ref 13–17)
IMMATURE GRANULOCYTES: 3 %
LYMPHOCYTES # BLD: 12 % (ref 24–44)
MCH RBC QN AUTO: 29.4 PG (ref 25.2–33.5)
MCHC RBC AUTO-ENTMCNC: 32.3 G/DL (ref 28.4–34.8)
MCV RBC AUTO: 90.8 FL (ref 82.6–102.9)
MONOCYTES # BLD: 3 % (ref 1–7)
MORPHOLOGY: NORMAL
NRBC AUTOMATED: 0.4 PER 100 WBC
PDW BLD-RTO: 14.2 % (ref 11.8–14.4)
PLATELET # BLD AUTO: 181 K/UL (ref 138–453)
PMV BLD AUTO: 11.1 FL (ref 8.1–13.5)
POTASSIUM SERPL-SCNC: 4.1 MMOL/L (ref 3.7–5.3)
RBC # BLD: 2.93 M/UL (ref 4.21–5.77)
SEG NEUTROPHILS: 82 % (ref 36–66)
SEGMENTED NEUTROPHILS ABSOLUTE COUNT: 12.7 K/UL (ref 1.8–7.7)
SODIUM SERPL-SCNC: 134 MMOL/L (ref 135–144)
WBC # BLD AUTO: 15.5 K/UL (ref 3.5–11.3)

## 2023-04-04 PROCEDURE — 80048 BASIC METABOLIC PNL TOTAL CA: CPT

## 2023-04-04 PROCEDURE — 6360000002 HC RX W HCPCS: Performed by: FAMILY MEDICINE

## 2023-04-04 PROCEDURE — 94761 N-INVAS EAR/PLS OXIMETRY MLT: CPT

## 2023-04-04 PROCEDURE — 36415 COLL VENOUS BLD VENIPUNCTURE: CPT

## 2023-04-04 PROCEDURE — 6370000000 HC RX 637 (ALT 250 FOR IP): Performed by: NURSE PRACTITIONER

## 2023-04-04 PROCEDURE — 2000000000 HC ICU R&B

## 2023-04-04 PROCEDURE — 99231 SBSQ HOSP IP/OBS SF/LOW 25: CPT | Performed by: PSYCHIATRY & NEUROLOGY

## 2023-04-04 PROCEDURE — 92523 SPEECH SOUND LANG COMPREHEN: CPT

## 2023-04-04 PROCEDURE — 6360000002 HC RX W HCPCS: Performed by: NURSE PRACTITIONER

## 2023-04-04 PROCEDURE — 2580000003 HC RX 258: Performed by: FAMILY MEDICINE

## 2023-04-04 PROCEDURE — 6370000000 HC RX 637 (ALT 250 FOR IP): Performed by: STUDENT IN AN ORGANIZED HEALTH CARE EDUCATION/TRAINING PROGRAM

## 2023-04-04 PROCEDURE — 85025 COMPLETE CBC W/AUTO DIFF WBC: CPT

## 2023-04-04 PROCEDURE — 70551 MRI BRAIN STEM W/O DYE: CPT

## 2023-04-04 PROCEDURE — 82947 ASSAY GLUCOSE BLOOD QUANT: CPT

## 2023-04-04 PROCEDURE — 2580000003 HC RX 258: Performed by: NURSE PRACTITIONER

## 2023-04-04 RX ADMIN — PANTOPRAZOLE SODIUM 40 MG: 40 TABLET, DELAYED RELEASE ORAL at 09:43

## 2023-04-04 RX ADMIN — DEXAMETHASONE SODIUM PHOSPHATE 4 MG: 4 INJECTION, SOLUTION INTRAMUSCULAR; INTRAVENOUS at 11:16

## 2023-04-04 RX ADMIN — LEVETIRACETAM 500 MG: 100 INJECTION, SOLUTION INTRAVENOUS at 15:01

## 2023-04-04 RX ADMIN — DEXAMETHASONE SODIUM PHOSPHATE 4 MG: 4 INJECTION, SOLUTION INTRAMUSCULAR; INTRAVENOUS at 23:37

## 2023-04-04 RX ADMIN — DEXAMETHASONE SODIUM PHOSPHATE 4 MG: 4 INJECTION, SOLUTION INTRAMUSCULAR; INTRAVENOUS at 17:31

## 2023-04-04 RX ADMIN — DOCUSATE SODIUM 50 MG AND SENNOSIDES 8.6 MG 2 TABLET: 8.6; 5 TABLET, FILM COATED ORAL at 07:51

## 2023-04-04 RX ADMIN — SODIUM CHLORIDE: 9 INJECTION, SOLUTION INTRAVENOUS at 01:45

## 2023-04-04 RX ADMIN — ACETAMINOPHEN 650 MG: 325 TABLET ORAL at 17:32

## 2023-04-04 RX ADMIN — OXYCODONE 5 MG: 5 TABLET ORAL at 03:40

## 2023-04-04 RX ADMIN — SODIUM CHLORIDE, PRESERVATIVE FREE 10 ML: 5 INJECTION INTRAVENOUS at 07:52

## 2023-04-04 RX ADMIN — ACETAMINOPHEN 650 MG: 325 TABLET ORAL at 23:37

## 2023-04-04 RX ADMIN — SODIUM CHLORIDE, PRESERVATIVE FREE 20 ML: 5 INJECTION INTRAVENOUS at 19:48

## 2023-04-04 RX ADMIN — TOPIRAMATE 50 MG: 25 TABLET, FILM COATED ORAL at 08:30

## 2023-04-04 RX ADMIN — SODIUM CHLORIDE: 9 INJECTION, SOLUTION INTRAVENOUS at 20:11

## 2023-04-04 RX ADMIN — LEVETIRACETAM 500 MG: 100 INJECTION, SOLUTION INTRAVENOUS at 03:21

## 2023-04-04 RX ADMIN — OXYCODONE 5 MG: 5 TABLET ORAL at 12:54

## 2023-04-04 RX ADMIN — DEXAMETHASONE SODIUM PHOSPHATE 4 MG: 4 INJECTION, SOLUTION INTRAMUSCULAR; INTRAVENOUS at 06:04

## 2023-04-04 RX ADMIN — OXYCODONE 5 MG: 5 TABLET ORAL at 20:20

## 2023-04-04 RX ADMIN — ASPIRIN 81 MG: 81 TABLET, CHEWABLE ORAL at 07:51

## 2023-04-04 RX ADMIN — ACETAMINOPHEN 650 MG: 325 TABLET ORAL at 06:17

## 2023-04-04 RX ADMIN — HYPROMELLOSE 2910 1 DROP: 5 SOLUTION OPHTHALMIC at 01:13

## 2023-04-04 RX ADMIN — ENOXAPARIN SODIUM 40 MG: 100 INJECTION SUBCUTANEOUS at 08:30

## 2023-04-04 ASSESSMENT — PAIN DESCRIPTION - ONSET
ONSET: GRADUAL
ONSET: ON-GOING
ONSET: GRADUAL
ONSET: AWAKENED FROM SLEEP
ONSET: ON-GOING
ONSET: GRADUAL
ONSET: GRADUAL
ONSET: ON-GOING
ONSET: ON-GOING

## 2023-04-04 ASSESSMENT — PAIN DESCRIPTION - DESCRIPTORS
DESCRIPTORS: ACHING

## 2023-04-04 ASSESSMENT — PAIN DESCRIPTION - PAIN TYPE
TYPE: ACUTE PAIN
TYPE: SURGICAL PAIN
TYPE: ACUTE PAIN
TYPE: ACUTE PAIN
TYPE: SURGICAL PAIN
TYPE: ACUTE PAIN
TYPE: SURGICAL PAIN
TYPE: ACUTE PAIN
TYPE: SURGICAL PAIN

## 2023-04-04 ASSESSMENT — PAIN DESCRIPTION - FREQUENCY
FREQUENCY: CONTINUOUS

## 2023-04-04 ASSESSMENT — PAIN SCALES - GENERAL
PAINLEVEL_OUTOF10: 0
PAINLEVEL_OUTOF10: 3
PAINLEVEL_OUTOF10: 4
PAINLEVEL_OUTOF10: 3
PAINLEVEL_OUTOF10: 0
PAINLEVEL_OUTOF10: 7
PAINLEVEL_OUTOF10: 7
PAINLEVEL_OUTOF10: 4
PAINLEVEL_OUTOF10: 8
PAINLEVEL_OUTOF10: 5
PAINLEVEL_OUTOF10: 4
PAINLEVEL_OUTOF10: 5
PAINLEVEL_OUTOF10: 5
PAINLEVEL_OUTOF10: 3
PAINLEVEL_OUTOF10: 4

## 2023-04-04 ASSESSMENT — PAIN DESCRIPTION - LOCATION
LOCATION: HEAD

## 2023-04-04 ASSESSMENT — PAIN DESCRIPTION - ORIENTATION
ORIENTATION: LEFT

## 2023-04-04 ASSESSMENT — PAIN - FUNCTIONAL ASSESSMENT: PAIN_FUNCTIONAL_ASSESSMENT: ACTIVITIES ARE NOT PREVENTED

## 2023-04-05 LAB
ABSOLUTE EOS #: 0 K/UL (ref 0–0.44)
ABSOLUTE IMMATURE GRANULOCYTE: 0.92 K/UL (ref 0–0.3)
ABSOLUTE LYMPH #: 1.23 K/UL (ref 1.1–3.7)
ABSOLUTE MONO #: 0.92 K/UL (ref 0.1–1.2)
ANION GAP SERPL CALCULATED.3IONS-SCNC: 10 MMOL/L (ref 9–17)
BASOPHILS # BLD: 0 % (ref 0–2)
BASOPHILS ABSOLUTE: 0 K/UL (ref 0–0.2)
BUN SERPL-MCNC: 18 MG/DL (ref 6–20)
CALCIUM SERPL-MCNC: 7.9 MG/DL (ref 8.6–10.4)
CHLORIDE SERPL-SCNC: 105 MMOL/L (ref 98–107)
CO2 SERPL-SCNC: 20 MMOL/L (ref 20–31)
CREAT SERPL-MCNC: 0.6 MG/DL (ref 0.7–1.2)
EOSINOPHILS RELATIVE PERCENT: 0 % (ref 1–4)
GFR SERPL CREATININE-BSD FRML MDRD: >60 ML/MIN/1.73M2
GLUCOSE BLD-MCNC: 128 MG/DL (ref 75–110)
GLUCOSE BLD-MCNC: 136 MG/DL (ref 75–110)
GLUCOSE BLD-MCNC: 167 MG/DL (ref 75–110)
GLUCOSE SERPL-MCNC: 130 MG/DL (ref 70–99)
HCT VFR BLD AUTO: 25.1 % (ref 40.7–50.3)
HGB BLD-MCNC: 8.2 G/DL (ref 13–17)
IMMATURE GRANULOCYTES: 6 %
LYMPHOCYTES # BLD: 8 % (ref 24–43)
MCH RBC QN AUTO: 29.6 PG (ref 25.2–33.5)
MCHC RBC AUTO-ENTMCNC: 32.7 G/DL (ref 28.4–34.8)
MCV RBC AUTO: 90.6 FL (ref 82.6–102.9)
MONOCYTES # BLD: 6 % (ref 3–12)
MORPHOLOGY: ABNORMAL
NRBC AUTOMATED: 0.3 PER 100 WBC
PDW BLD-RTO: 15 % (ref 11.8–14.4)
PLATELET # BLD AUTO: 214 K/UL (ref 138–453)
PMV BLD AUTO: 11.1 FL (ref 8.1–13.5)
POTASSIUM SERPL-SCNC: 3.7 MMOL/L (ref 3.7–5.3)
RBC # BLD: 2.77 M/UL (ref 4.21–5.77)
SEG NEUTROPHILS: 80 % (ref 36–65)
SEGMENTED NEUTROPHILS ABSOLUTE COUNT: 12.33 K/UL (ref 1.5–8.1)
SODIUM SERPL-SCNC: 135 MMOL/L (ref 135–144)
WBC # BLD AUTO: 15.4 K/UL (ref 3.5–11.3)

## 2023-04-05 PROCEDURE — 97162 PT EVAL MOD COMPLEX 30 MIN: CPT

## 2023-04-05 PROCEDURE — 6370000000 HC RX 637 (ALT 250 FOR IP): Performed by: NURSE PRACTITIONER

## 2023-04-05 PROCEDURE — 97535 SELF CARE MNGMENT TRAINING: CPT

## 2023-04-05 PROCEDURE — 92507 TX SP LANG VOICE COMM INDIV: CPT

## 2023-04-05 PROCEDURE — 6360000002 HC RX W HCPCS: Performed by: FAMILY MEDICINE

## 2023-04-05 PROCEDURE — 97166 OT EVAL MOD COMPLEX 45 MIN: CPT

## 2023-04-05 PROCEDURE — APPSS15 APP SPLIT SHARED TIME 0-15 MINUTES: Performed by: NURSE PRACTITIONER

## 2023-04-05 PROCEDURE — 6360000002 HC RX W HCPCS: Performed by: NURSE PRACTITIONER

## 2023-04-05 PROCEDURE — 2000000000 HC ICU R&B

## 2023-04-05 PROCEDURE — 36415 COLL VENOUS BLD VENIPUNCTURE: CPT

## 2023-04-05 PROCEDURE — 6370000000 HC RX 637 (ALT 250 FOR IP): Performed by: STUDENT IN AN ORGANIZED HEALTH CARE EDUCATION/TRAINING PROGRAM

## 2023-04-05 PROCEDURE — 2580000003 HC RX 258: Performed by: NURSE PRACTITIONER

## 2023-04-05 PROCEDURE — 85025 COMPLETE CBC W/AUTO DIFF WBC: CPT

## 2023-04-05 PROCEDURE — 80048 BASIC METABOLIC PNL TOTAL CA: CPT

## 2023-04-05 PROCEDURE — 94761 N-INVAS EAR/PLS OXIMETRY MLT: CPT

## 2023-04-05 PROCEDURE — 82947 ASSAY GLUCOSE BLOOD QUANT: CPT

## 2023-04-05 PROCEDURE — 97530 THERAPEUTIC ACTIVITIES: CPT

## 2023-04-05 PROCEDURE — 2580000003 HC RX 258: Performed by: FAMILY MEDICINE

## 2023-04-05 RX ORDER — DEXAMETHASONE 4 MG/1
4 TABLET ORAL EVERY 8 HOURS SCHEDULED
Status: COMPLETED | OUTPATIENT
Start: 2023-04-05 | End: 2023-04-06

## 2023-04-05 RX ORDER — DEXAMETHASONE SODIUM PHOSPHATE 4 MG/ML
4 INJECTION, SOLUTION INTRA-ARTICULAR; INTRALESIONAL; INTRAMUSCULAR; INTRAVENOUS; SOFT TISSUE EVERY 8 HOURS
Status: DISCONTINUED | OUTPATIENT
Start: 2023-04-05 | End: 2023-04-05

## 2023-04-05 RX ORDER — DEXAMETHASONE 4 MG/1
4 TABLET ORAL EVERY 12 HOURS SCHEDULED
Status: COMPLETED | OUTPATIENT
Start: 2023-04-06 | End: 2023-04-07

## 2023-04-05 RX ORDER — DEXAMETHASONE 4 MG/1
4 TABLET ORAL DAILY
Status: DISCONTINUED | OUTPATIENT
Start: 2023-04-08 | End: 2023-04-07 | Stop reason: HOSPADM

## 2023-04-05 RX ORDER — DEXAMETHASONE 2 MG/1
2 TABLET ORAL DAILY
Status: DISCONTINUED | OUTPATIENT
Start: 2023-04-09 | End: 2023-04-07 | Stop reason: HOSPADM

## 2023-04-05 RX ADMIN — SODIUM CHLORIDE: 9 INJECTION, SOLUTION INTRAVENOUS at 04:19

## 2023-04-05 RX ADMIN — OXYCODONE 5 MG: 5 TABLET ORAL at 05:57

## 2023-04-05 RX ADMIN — HYPROMELLOSE 2910 1 DROP: 5 SOLUTION OPHTHALMIC at 00:03

## 2023-04-05 RX ADMIN — OXYCODONE 5 MG: 5 TABLET ORAL at 16:57

## 2023-04-05 RX ADMIN — OXYCODONE 5 MG: 5 TABLET ORAL at 21:54

## 2023-04-05 RX ADMIN — DOCUSATE SODIUM 50 MG AND SENNOSIDES 8.6 MG 2 TABLET: 8.6; 5 TABLET, FILM COATED ORAL at 08:37

## 2023-04-05 RX ADMIN — OXYCODONE 5 MG: 5 TABLET ORAL at 13:01

## 2023-04-05 RX ADMIN — LEVETIRACETAM 500 MG: 100 INJECTION, SOLUTION INTRAVENOUS at 14:50

## 2023-04-05 RX ADMIN — HYPROMELLOSE 2910 1 DROP: 5 SOLUTION OPHTHALMIC at 04:20

## 2023-04-05 RX ADMIN — TOPIRAMATE 50 MG: 25 TABLET, FILM COATED ORAL at 08:37

## 2023-04-05 RX ADMIN — ACETAMINOPHEN 650 MG: 325 TABLET ORAL at 12:09

## 2023-04-05 RX ADMIN — ENOXAPARIN SODIUM 40 MG: 100 INJECTION SUBCUTANEOUS at 08:37

## 2023-04-05 RX ADMIN — DEXAMETHASONE SODIUM PHOSPHATE 4 MG: 4 INJECTION, SOLUTION INTRAMUSCULAR; INTRAVENOUS at 05:44

## 2023-04-05 RX ADMIN — SODIUM CHLORIDE: 9 INJECTION, SOLUTION INTRAVENOUS at 15:54

## 2023-04-05 RX ADMIN — OXYCODONE 5 MG: 5 TABLET ORAL at 01:15

## 2023-04-05 RX ADMIN — LEVETIRACETAM 500 MG: 100 INJECTION, SOLUTION INTRAVENOUS at 03:20

## 2023-04-05 RX ADMIN — ASPIRIN 81 MG: 81 TABLET, CHEWABLE ORAL at 08:37

## 2023-04-05 RX ADMIN — SODIUM CHLORIDE, PRESERVATIVE FREE 10 ML: 5 INJECTION INTRAVENOUS at 08:38

## 2023-04-05 RX ADMIN — DEXAMETHASONE 4 MG: 4 TABLET ORAL at 14:50

## 2023-04-05 RX ADMIN — PANTOPRAZOLE SODIUM 40 MG: 40 TABLET, DELAYED RELEASE ORAL at 08:37

## 2023-04-05 RX ADMIN — SODIUM CHLORIDE, PRESERVATIVE FREE 10 ML: 5 INJECTION INTRAVENOUS at 21:54

## 2023-04-05 RX ADMIN — DEXAMETHASONE 4 MG: 4 TABLET ORAL at 21:54

## 2023-04-05 ASSESSMENT — PAIN DESCRIPTION - DESCRIPTORS
DESCRIPTORS: ACHING

## 2023-04-05 ASSESSMENT — PAIN DESCRIPTION - ORIENTATION
ORIENTATION: LEFT
ORIENTATION: POSTERIOR
ORIENTATION: POSTERIOR
ORIENTATION: LEFT

## 2023-04-05 ASSESSMENT — PAIN DESCRIPTION - PAIN TYPE
TYPE: SURGICAL PAIN
TYPE: SURGICAL PAIN
TYPE: SURGICAL PAIN;CHRONIC PAIN
TYPE: SURGICAL PAIN

## 2023-04-05 ASSESSMENT — PAIN DESCRIPTION - LOCATION
LOCATION: HEAD

## 2023-04-05 ASSESSMENT — PAIN DESCRIPTION - FREQUENCY
FREQUENCY: CONTINUOUS
FREQUENCY: INTERMITTENT
FREQUENCY: INTERMITTENT
FREQUENCY: CONTINUOUS

## 2023-04-05 ASSESSMENT — PAIN SCALES - GENERAL
PAINLEVEL_OUTOF10: 7
PAINLEVEL_OUTOF10: 5
PAINLEVEL_OUTOF10: 5
PAINLEVEL_OUTOF10: 6
PAINLEVEL_OUTOF10: 6
PAINLEVEL_OUTOF10: 2
PAINLEVEL_OUTOF10: 6
PAINLEVEL_OUTOF10: 4
PAINLEVEL_OUTOF10: 7
PAINLEVEL_OUTOF10: 6
PAINLEVEL_OUTOF10: 2

## 2023-04-05 ASSESSMENT — PAIN DESCRIPTION - ONSET
ONSET: AWAKENED FROM SLEEP
ONSET: ON-GOING
ONSET: GRADUAL
ONSET: ON-GOING
ONSET: AWAKENED FROM SLEEP

## 2023-04-05 ASSESSMENT — PAIN - FUNCTIONAL ASSESSMENT: PAIN_FUNCTIONAL_ASSESSMENT: ACTIVITIES ARE NOT PREVENTED

## 2023-04-06 LAB
ABSOLUTE EOS #: 0 K/UL (ref 0–0.4)
ABSOLUTE IMMATURE GRANULOCYTE: 0.79 K/UL (ref 0–0.3)
ABSOLUTE LYMPH #: 1.26 K/UL (ref 1–4.8)
ABSOLUTE MONO #: 1.73 K/UL (ref 0.1–0.8)
ANION GAP SERPL CALCULATED.3IONS-SCNC: 11 MMOL/L (ref 9–17)
BASOPHILS # BLD: 0 % (ref 0–2)
BASOPHILS ABSOLUTE: 0 K/UL (ref 0–0.2)
BUN SERPL-MCNC: 18 MG/DL (ref 6–20)
CALCIUM SERPL-MCNC: 7.8 MG/DL (ref 8.6–10.4)
CHLORIDE SERPL-SCNC: 105 MMOL/L (ref 98–107)
CO2 SERPL-SCNC: 20 MMOL/L (ref 20–31)
CREAT SERPL-MCNC: 0.58 MG/DL (ref 0.7–1.2)
EOSINOPHILS RELATIVE PERCENT: 0 % (ref 1–4)
GFR SERPL CREATININE-BSD FRML MDRD: >60 ML/MIN/1.73M2
GLUCOSE BLD-MCNC: 128 MG/DL (ref 75–110)
GLUCOSE BLD-MCNC: 132 MG/DL (ref 75–110)
GLUCOSE BLD-MCNC: 140 MG/DL (ref 75–110)
GLUCOSE SERPL-MCNC: 128 MG/DL (ref 70–99)
HCT VFR BLD AUTO: 26.2 % (ref 40.7–50.3)
HGB BLD-MCNC: 8.9 G/DL (ref 13–17)
IMMATURE GRANULOCYTES: 5 %
LYMPHOCYTES # BLD: 8 % (ref 24–44)
MCH RBC QN AUTO: 30 PG (ref 25.2–33.5)
MCHC RBC AUTO-ENTMCNC: 34 G/DL (ref 28.4–34.8)
MCV RBC AUTO: 88.2 FL (ref 82.6–102.9)
MONOCYTES # BLD: 11 % (ref 1–7)
MORPHOLOGY: ABNORMAL
MORPHOLOGY: ABNORMAL
NRBC AUTOMATED: 0.4 PER 100 WBC
PDW BLD-RTO: 15.3 % (ref 11.8–14.4)
PLATELET # BLD AUTO: 195 K/UL (ref 138–453)
PMV BLD AUTO: 11.1 FL (ref 8.1–13.5)
POTASSIUM SERPL-SCNC: 3.7 MMOL/L (ref 3.7–5.3)
RBC # BLD: 2.97 M/UL (ref 4.21–5.77)
SEG NEUTROPHILS: 76 % (ref 36–66)
SEGMENTED NEUTROPHILS ABSOLUTE COUNT: 11.92 K/UL (ref 1.8–7.7)
SODIUM SERPL-SCNC: 136 MMOL/L (ref 135–144)
WBC # BLD AUTO: 15.7 K/UL (ref 3.5–11.3)

## 2023-04-06 PROCEDURE — 6370000000 HC RX 637 (ALT 250 FOR IP): Performed by: STUDENT IN AN ORGANIZED HEALTH CARE EDUCATION/TRAINING PROGRAM

## 2023-04-06 PROCEDURE — APPSS15 APP SPLIT SHARED TIME 0-15 MINUTES: Performed by: NURSE PRACTITIONER

## 2023-04-06 PROCEDURE — 2000000000 HC ICU R&B

## 2023-04-06 PROCEDURE — 6370000000 HC RX 637 (ALT 250 FOR IP): Performed by: NURSE PRACTITIONER

## 2023-04-06 PROCEDURE — 82947 ASSAY GLUCOSE BLOOD QUANT: CPT

## 2023-04-06 PROCEDURE — 36415 COLL VENOUS BLD VENIPUNCTURE: CPT

## 2023-04-06 PROCEDURE — 6360000002 HC RX W HCPCS: Performed by: NURSE PRACTITIONER

## 2023-04-06 PROCEDURE — 2580000003 HC RX 258: Performed by: NURSE PRACTITIONER

## 2023-04-06 PROCEDURE — 80048 BASIC METABOLIC PNL TOTAL CA: CPT

## 2023-04-06 PROCEDURE — 85025 COMPLETE CBC W/AUTO DIFF WBC: CPT

## 2023-04-06 PROCEDURE — 92507 TX SP LANG VOICE COMM INDIV: CPT

## 2023-04-06 RX ORDER — OXYCODONE HYDROCHLORIDE AND ACETAMINOPHEN 5; 325 MG/1; MG/1
1 TABLET ORAL EVERY 6 HOURS PRN
Qty: 28 TABLET | Refills: 0 | Status: SHIPPED | OUTPATIENT
Start: 2023-04-06 | End: 2023-04-13

## 2023-04-06 RX ORDER — ASPIRIN 81 MG/1
81 TABLET, CHEWABLE ORAL DAILY
Qty: 30 TABLET | Refills: 3 | Status: SHIPPED | OUTPATIENT
Start: 2023-04-07

## 2023-04-06 RX ORDER — LEVETIRACETAM 500 MG/1
500 TABLET ORAL 2 TIMES DAILY
Qty: 60 TABLET | Refills: 3 | Status: SHIPPED | OUTPATIENT
Start: 2023-04-06

## 2023-04-06 RX ORDER — SENNA AND DOCUSATE SODIUM 50; 8.6 MG/1; MG/1
2 TABLET, FILM COATED ORAL DAILY
Qty: 30 TABLET | Refills: 0 | Status: SHIPPED | OUTPATIENT
Start: 2023-04-07

## 2023-04-06 RX ORDER — PANTOPRAZOLE SODIUM 40 MG/1
40 TABLET, DELAYED RELEASE ORAL
Qty: 30 TABLET | Refills: 3 | Status: SHIPPED | OUTPATIENT
Start: 2023-04-07

## 2023-04-06 RX ORDER — LEVETIRACETAM 500 MG/1
500 TABLET ORAL 2 TIMES DAILY
Status: DISCONTINUED | OUTPATIENT
Start: 2023-04-06 | End: 2023-04-07 | Stop reason: HOSPADM

## 2023-04-06 RX ADMIN — OXYCODONE 5 MG: 5 TABLET ORAL at 20:56

## 2023-04-06 RX ADMIN — POLYETHYLENE GLYCOL 3350 17 G: 17 POWDER, FOR SOLUTION ORAL at 13:49

## 2023-04-06 RX ADMIN — SODIUM CHLORIDE, PRESERVATIVE FREE 10 ML: 5 INJECTION INTRAVENOUS at 08:52

## 2023-04-06 RX ADMIN — LEVETIRACETAM 500 MG: 100 INJECTION, SOLUTION INTRAVENOUS at 02:31

## 2023-04-06 RX ADMIN — OXYCODONE 5 MG: 5 TABLET ORAL at 10:28

## 2023-04-06 RX ADMIN — TOPIRAMATE 50 MG: 25 TABLET, FILM COATED ORAL at 08:48

## 2023-04-06 RX ADMIN — DEXAMETHASONE 4 MG: 4 TABLET ORAL at 05:14

## 2023-04-06 RX ADMIN — SODIUM CHLORIDE, PRESERVATIVE FREE 10 ML: 5 INJECTION INTRAVENOUS at 20:54

## 2023-04-06 RX ADMIN — ENOXAPARIN SODIUM 40 MG: 100 INJECTION SUBCUTANEOUS at 08:48

## 2023-04-06 RX ADMIN — DEXAMETHASONE 4 MG: 4 TABLET ORAL at 15:58

## 2023-04-06 RX ADMIN — LEVETIRACETAM 500 MG: 500 TABLET, FILM COATED ORAL at 11:48

## 2023-04-06 RX ADMIN — DOCUSATE SODIUM 50 MG AND SENNOSIDES 8.6 MG 2 TABLET: 8.6; 5 TABLET, FILM COATED ORAL at 08:48

## 2023-04-06 RX ADMIN — ASPIRIN 81 MG: 81 TABLET, CHEWABLE ORAL at 08:48

## 2023-04-06 RX ADMIN — OXYCODONE 5 MG: 5 TABLET ORAL at 02:34

## 2023-04-06 RX ADMIN — PANTOPRAZOLE SODIUM 40 MG: 40 TABLET, DELAYED RELEASE ORAL at 08:48

## 2023-04-06 RX ADMIN — LEVETIRACETAM 500 MG: 500 TABLET, FILM COATED ORAL at 20:54

## 2023-04-06 ASSESSMENT — PAIN DESCRIPTION - PAIN TYPE: TYPE: ACUTE PAIN;CHRONIC PAIN

## 2023-04-06 ASSESSMENT — PAIN DESCRIPTION - LOCATION
LOCATION: HEAD;FLANK
LOCATION: HEAD

## 2023-04-06 ASSESSMENT — PAIN SCALES - GENERAL
PAINLEVEL_OUTOF10: 7
PAINLEVEL_OUTOF10: 5
PAINLEVEL_OUTOF10: 6

## 2023-04-06 ASSESSMENT — PAIN DESCRIPTION - DESCRIPTORS
DESCRIPTORS: ACHING;DISCOMFORT
DESCRIPTORS: ACHING

## 2023-04-06 ASSESSMENT — PAIN DESCRIPTION - ORIENTATION: ORIENTATION: POSTERIOR

## 2023-04-06 NOTE — DISCHARGE INSTRUCTIONS
Severe or worsening headaches. Ongoing nausea and/or vomiting. If you notice any signs of infection such as bleeding, redness, swelling, tenderness, odor, drainage or opening of the incision. Please check your incisions twice daily. Fevers greater than 101.5 degrees   Clear fluid leaking from the incision. Flu-like symptoms, chills, shakes, chest pain, shortness of breath, nausea, vomiting, diarrhea   Changes in mental status such as confusion, slurred speech, increased sleepiness. Any new neurologic sensory or motor deficits (weakness, numbness)   Seizures   Leg swelling or calf tenderness     *If you are unable to contact someone at the office and your symptoms persist or increase, call 911 or go to the emergency department.

## 2023-04-06 NOTE — CONSULTS
AM    CALCIUM 8.6 03/29/2023 05:28 AM    GFRAA >60 09/29/2022 05:56 PM    LABGLOM >60 03/29/2023 05:28 AM    GLUCOSE 147 03/29/2023 05:28 AM    GLUCOSE 101 06/01/2012 08:25 AM       Radiology Review:    Pending MRI brain, MRA/MRV       ASSESSMENT AND PLAN:       Patient Active Problem List   Diagnosis    Left flank pain, chronic    Type 2 diabetes mellitus without complication (HCC)    AVM (arteriovenous malformation) brain    Migraine with aura and without status migrainosus, not intractable    AVM (arteriovenous malformation)         A/P:  This is a 51 y.o. male with left parieto-occipital AVM  Patient care will be discussed with attending, will reevaluated patient along with attending.      - NPO at midnight- OR 0700 tomorrow    - planning surgical resection of AVM with DR Kalie Coulter Thursday    - maintain in ICU   - SBP management per [place]  - keppra 500mg BID for seizure prophylaxis  - Decadron 4mg TID for headaches   - obtain MRI brain/ MVA/MRV     Additional recommendations may follow    Please contact neurosurgery with any changes in patients neurologic status. Thank you for your consult.        [employer]   3/29/2023  6:24 AM
CIRCULATION: No significant stenosis of the intracranial internal carotid, anterior cerebral, or middle cerebral arteries. Tortuous enlarged cortical branches of left MCA superior branch and left PCA P4 segments supply the nidus of arteriovenous malformation. POSTERIOR CIRCULATION: No significant stenosis of the vertebral, basilar, or posterior cerebral arteries. ANEURYSM: No intracranial aneurysm is seen. MRV of the head: No evidence of dural sinus thrombosis is noted. Brain MRI: There is no acute infarction, intracranial hemorrhage, or intracranial mass lesion. Unchanged left occipito temporoparietal arteriovenous malformation containing 3.9 cm nidus. There is unchanged mild surrounding white matter signal changes likely suggestive of gliosis of mild vasogenic edema. Brain MRA: No hemodynamically significant stenosis. Tortuous enlarged cortical branches of left MCA M2 superior branch and left PCA P4 segments supply the nidus of arteriovenous malformation. No aneurysm. MRV of the head: The visualized dural sinuses are widely patent. No dural sinus thrombosis or stenosis is noted. Large tortuous left-sided venous structure in left fronto parietal occipital region joining the superior sagittal sinus and straight sinus. RECOMMENDATIONS: Unavailable     MRI BRAIN WO CONTRAST    Result Date: 4/5/2023  EXAMINATION: MRI OF THE BRAIN WITHOUT CONTRAST  4/4/2023 4:33 pm TECHNIQUE: Multiplanar multisequence MRI of the brain was performed without the administration of intravenous contrast. COMPARISON: 04/03/2023 and 03/29/2023. HISTORY: ORDERING SYSTEM PROVIDED HISTORY: Mixed aphasia TECHNOLOGIST PROVIDED HISTORY: Mixed aphasia Reason for Exam: Mixed aphasia FINDINGS: INTRACRANIAL STRUCTURES/VENTRICLES: There are 5 punctate acute infarcts in the bilateral frontal white matter predominantly in the anterior cerebral artery territories.  Postoperative changes in the left temporo-occipital region are similar to the recent

## 2023-04-06 NOTE — CARE COORDINATION
Family states that the plan is to take patient home and would like home therapy. Patient has [employer] , it is a subsidiary of [employer]. CM will need to confirm if [facility], [facility]. or OP therapy is covered as this is a limited coverage policy. Called Rebekah Hall Rule [phone] to inquire about the benefits that are available for [facility], [facility]. or OP therapy.   Spoke to Petros he informs me that the patient  has coverage for OP therapies after deduct able of 2500.00    Referral sent to [facility] as that is the only agency in the area that accepts the insurance
Patient remains intubated. Angio is planned for this AM.   MRI requested d/t worsening Aphsia. Wife is at bedside on the phone with daughter. We discussed ( via conference call the plan for discharge. Plan remains to return home with OP therapies as needed. Awaiting Updated PT.   May qualify for ARU
Transitional planning: Plan currently unknown due to patient condition. Prior to surgery plan was to return home. Will continue to follow.
Family    Financial    Payor: [address] [place] [address] Avenue / Plan: ROS[employer]O RULE / Product Type: *No Product type* /     Does insurance require precert for SNF: Yes    Potential assistance Purchasing Medications:    Meds-to-Beds request: Yes      [facility] [id number] [place], [address]  [address] [facility] [address]  Phone: [phone] Fax: [phone] [address] [facility] [address] [place], [place] - [address] [place] [address] [phone] Génesis Earnest [phone]  [address]  [facility] [address]  Phone: [phone] Fax: [phone]      Notes:    Factors facilitating achievement of predicted outcomes: Family support, Cooperative, and Pleasant    Barriers to discharge: Medical complications and Stairs at home    Additional Case Management Notes: Maori speaking, plans to return home, has supportive family, MRI today    The Plan for Transition of Care is related to the following treatment goals of AVM (arteriovenous malformation) brain [L27.5]    IF APPLICABLE: The Patient and/or patient representative Javi and his family were provided with a choice of provider and agrees with the discharge plan. Freedom of choice list with basic dialogue that supports the patient's individualized plan of care/goals and shares the quality data associated with the providers was provided to: Patient   Patient Representative Name:       The Patient and/or Patient Representative Agree with the Discharge Plan?  Yes    Angelita Murcia RN  Case Management Department  Ph: [phone]:
[x] Not at all  [] Several Days  [] More than half the day  []  Nearly every day    Total Score: 2    If you checked off any problems, how difficult have these problems made it for you to do your work, take care of things at home, or get along with other people?    [x] Not difficult at all  [] Somewhat Difficult  [] Very Difficult  []  Extremely Difficult
list was provided with basic dialogue that supports the patient's individualized plan of care/goals, treatment preferences, and shares the quality data associated with the providers?   Yes   Venezuelan speaking, plans to return home, has supportive family, MRI today

## 2023-04-07 ENCOUNTER — APPOINTMENT (OUTPATIENT)
Dept: CT IMAGING | Age: 48
End: 2023-04-07
Attending: PSYCHIATRY & NEUROLOGY
Payer: COMMERCIAL

## 2023-04-07 VITALS
BODY MASS INDEX: 24.03 KG/M2 | HEIGHT: 65 IN | RESPIRATION RATE: 16 BRPM | OXYGEN SATURATION: 96 % | HEART RATE: 64 BPM | SYSTOLIC BLOOD PRESSURE: 112 MMHG | TEMPERATURE: 98.1 F | WEIGHT: 144.2 LBS | DIASTOLIC BLOOD PRESSURE: 67 MMHG

## 2023-04-07 LAB — GLUCOSE BLD-MCNC: 108 MG/DL (ref 75–110)

## 2023-04-07 PROCEDURE — 6370000000 HC RX 637 (ALT 250 FOR IP): Performed by: NURSE PRACTITIONER

## 2023-04-07 PROCEDURE — 97116 GAIT TRAINING THERAPY: CPT

## 2023-04-07 PROCEDURE — 6360000002 HC RX W HCPCS: Performed by: NURSE PRACTITIONER

## 2023-04-07 PROCEDURE — 70450 CT HEAD/BRAIN W/O DYE: CPT

## 2023-04-07 PROCEDURE — 2580000003 HC RX 258: Performed by: NURSE PRACTITIONER

## 2023-04-07 PROCEDURE — 6370000000 HC RX 637 (ALT 250 FOR IP): Performed by: STUDENT IN AN ORGANIZED HEALTH CARE EDUCATION/TRAINING PROGRAM

## 2023-04-07 PROCEDURE — 92507 TX SP LANG VOICE COMM INDIV: CPT

## 2023-04-07 PROCEDURE — 97110 THERAPEUTIC EXERCISES: CPT

## 2023-04-07 PROCEDURE — 82947 ASSAY GLUCOSE BLOOD QUANT: CPT

## 2023-04-07 RX ORDER — DEXAMETHASONE 1 MG
1 TABLET ORAL DAILY
Qty: 2 TABLET | Refills: 0 | Status: SHIPPED | OUTPATIENT
Start: 2023-04-12 | End: 2023-04-14

## 2023-04-07 RX ORDER — DEXAMETHASONE 4 MG/1
4 TABLET ORAL DAILY
Qty: 2 TABLET | Refills: 0 | Status: SHIPPED | OUTPATIENT
Start: 2023-04-08 | End: 2023-04-09

## 2023-04-07 RX ORDER — DEXAMETHASONE 2 MG/1
2 TABLET ORAL DAILY
Qty: 1 TABLET | Refills: 0 | Status: SHIPPED | OUTPATIENT
Start: 2023-04-10 | End: 2023-04-11

## 2023-04-07 RX ADMIN — TOPIRAMATE 50 MG: 25 TABLET, FILM COATED ORAL at 08:55

## 2023-04-07 RX ADMIN — DOCUSATE SODIUM 50 MG AND SENNOSIDES 8.6 MG 2 TABLET: 8.6; 5 TABLET, FILM COATED ORAL at 08:55

## 2023-04-07 RX ADMIN — ENOXAPARIN SODIUM 40 MG: 100 INJECTION SUBCUTANEOUS at 08:55

## 2023-04-07 RX ADMIN — PANTOPRAZOLE SODIUM 40 MG: 40 TABLET, DELAYED RELEASE ORAL at 08:55

## 2023-04-07 RX ADMIN — DEXAMETHASONE 4 MG: 4 TABLET ORAL at 08:55

## 2023-04-07 RX ADMIN — SODIUM CHLORIDE, PRESERVATIVE FREE 10 ML: 5 INJECTION INTRAVENOUS at 08:56

## 2023-04-07 RX ADMIN — LEVETIRACETAM 500 MG: 500 TABLET, FILM COATED ORAL at 08:55

## 2023-04-07 RX ADMIN — ASPIRIN 81 MG: 81 TABLET, CHEWABLE ORAL at 08:55

## 2023-04-07 NOTE — PROGRESS NOTES
[address] [facility] [address]  PROGRESS NOTE    Shift date: 4.3.2023  Shift day: Monday   Shift # 2    Room # 0129/0129-01   Name: Lee Ortiz                Anabaptist: Brian Patel 33 of Baptist: unknown    Referral: Routine Visit    Admit Date & Time: 3/28/2023  4:37 PM    Assessment:  Lee Ortiz is a 51 y.o. male in the hospital with a spiritual service consult. Upon entering the room writer observes patient calm and coping with family bedside. Family appears to be coping as well. Intervention:  Writer introduced self and title as  Writer offered space for the patient and family  to express feelings, needs, and concerns and provided a ministry presence. Outcome:  Patient and family appear to remain calm and coping    Plan:  Chaplains will remain available to offer spiritual and emotional support as needed.       Electronically signed by Rudy Lassiter [facility]4/2023 [facility]  [phone]       04/03/23 9867   Encounter Summary   Service Provided For: Patient and family together   Referral/Consult From: [facility] Family members   Last Encounter  04/04/23   Complexity of Encounter Moderate   Begin Time 2030   End Time  2040   Total Time Calculated 10 min   Encounter    Type Follow up   Assessment/Intervention/Outcome   Assessment Calm;Coping   Intervention Active listening;Explored/Affirmed feelings, thoughts, concerns   Outcome Coping       Electronically signed by Renee Barraza on 4/4/2023 at 12:01 AM
[address] [facility]--Neuro ICU 1B    Speech Language Treatment Note    Date: 4/7/2023  Patients Name: Rhoda Silvestre  MRN: [id number]  Patient Active Problem List   Diagnosis Code    Left flank pain, chronic R10.9, G89.29    Type 2 diabetes mellitus without complication (HCC) C53.8    AVM (arteriovenous malformation) brain Q28.2    Migraine with aura and without status migrainosus, not intractable G43.109    AVM (arteriovenous malformation) Q27.30    Aneurysm (HCC) I72.9    S/P brain surgery Z98.890    Aphasia R47.01    Cerebral edema (HCC) G93.6       Pain: 0/10    Speech and Language Treatment  Treatment time: 7609-5879    Subjective: [x] Alert [x] Cooperative     [] Confused     [] Agitated    [] Lethargic      Objective/Assessment:  Auditory Comprehension: Simple y/n questions: 9/10 increased to 10/10 with min verbal cues           2 unit commands: 4/6 increased to 6/6 with repetition and max verbal/visual cues    Verbal Expression: Biographical information: Pt. Able to state first and last name, age, hospital, month and year independently            Word generation, concrete: 15/20 increased to 19/20 with min verbal cues, phonemic cues, and choice of 2. Word recall from description: 5/10 increased to 8/10 with phonemic cues             Naming pictures: 6/7 increased to 7/7 with min verbal cues            Naming picture function: 7/7 independently    **Pt. States he feels his deficits are improving. Plan:  [x] Continue ST services    [] Discharge from ST:      Discharge recommendations: []  Further therapy recommended at discharge. The patient should be able to tolerate at least 3 hours of therapy per day over 5 days or 15 hours over 7 days. [x] Further therapy recommended at discharge. [] No therapy recommended at discharge. Treatment completed by: Jayson Townsend, SLP, M.A.  MEGAN-SLP
Assessment: Patient was awake when  visited. Family was present and open to spiritual care. Patient remained hopeful but seemed to be having a rough time. Patient was raised Latter day. Patient received sacrament of anointing of the sick. Intervention:  provided ministry of presence, offered support and prayed with patient and family. Outcome: Patient and family expressed gratitude for the anointing and blessing they received. Plan: Follow up visits recommended for ongoing assessment of patient's condition and for more prayers and support.
CLINICAL PHARMACY NOTE: MEDS TO BEDS    Total # of Prescriptions Filled: 5   The following medications were delivered to the patient:  Aspirin low dose 81mg chew  Senexon-s 8.6-50mg tabs  Oxycodone-acetaminophen 5-325 tabs  Levetiracetam 500mg tabs  Pantoprazole 40mg tabs    Additional Documentation:  Delivered to pt + 1 in room 129 on 4/7 around 1:45PM. Copay was $10.83 paid with cash.
Endovascular Neurosurgery Progress Note      Reason for evaluation: Left parieto-occipital AVM     SUBJECTIVE:   No acute events overnight, aphasia significantly improved     Review of Systems:  CONSTITUTIONAL:  negative for fevers, chills, fatigue and malaise    EYES:  negative for double vision, blurred vision and photophobia     HEENT:  negative for tinnitus, epistaxis and sore throat    RESPIRATORY:  negative for cough, shortness of breath, wheezing    CARDIOVASCULAR:  negative for chest pain, palpitations, syncope, edema    GASTROINTESTINAL:  negative for nausea, vomiting    GENITOURINARY:  negative for incontinence    MUSCULOSKELETAL:  negative for neck or back pain    NEUROLOGICAL:  Negative for weakness and tingling  negative for headaches and dizziness    PSYCHIATRIC:  negative for anxiety      Review of systems otherwise negative. OBJECTIVE:     Vitals:    04/06/23 1100   BP: 99/64   Pulse: 61   Resp: 14   Temp:    SpO2:         General:  Gen: normal habitus, NAD  HEENT: NCAT, mucosa moist  Cvs: RRR, S1 S2 normal  Resp: symmetric unlabored breathing  Abd: s/nd/nt  Ext: no edema  Skin: no lesions seen, warm and dry  Groins: both groins looks clean, distal pulses intact    Neuro:  Gen: awake and alert, oriented x3. Lang/speech: no aphasia or dysarthria. Follows commands but slow. CN: PERRL, EOMI, VFF, V1-3 intact, face symmetric, hearing intact, shoulder shrug symmetric, tongue midline  Motor: grossly 5/5 UE and LE b/l  Sense: LT intact in all 4 ext. Coord: FTN and HTS intact b/l  DTR: deferred  Gait: deferred    NIH Stroke Scale:   1a  Level of consciousness: 0 - alert; keenly responsive   1b. LOC questions:  0 - answers both questions correctly   1c. LOC commands: 0 - performs both tasks correctly   2. Best Gaze: 0 - normal   3. Visual: 0 - no visual loss   4. Facial Palsy: 0 - normal symmetric movement   5a.  Motor left arm: 0 - no drift, limb holds 90 (or 45) degrees for full 10 seconds
[facility])  Occupational Therapy Not Seen Note    DATE: 4/3/2023    NAME: Alhaji Jerez  MRN: [id number]   : 1975      Patient not seen this date for Occupational Therapy due to:    Surgery/Procedure: DSA this AM. Will check this afternoon as time allows    Next Scheduled Treatment: PM or 2023    Electronically signed by ANDREAS Fan on 4/3/2023 at 11:43 AM
Neurosurgery MARLENI/Resident    Daily Progress Note   No chief complaint on file. 4/1/2023  8:55 AM    Chart reviewed. Extubated yesterday evening. 5/10 headache this AM. Denies nausea. Vitals:    04/01/23 0500 04/01/23 0600 04/01/23 0604 04/01/23 0635   BP: 108/65 113/64 113/64    Pulse: 65 62 68 61   Resp: 20 17 20 17   Temp: 100 °F (37.8 °C) 100 °F (37.8 °C) 100 °F (37.8 °C) 99.9 °F (37.7 °C)   TempSrc:       SpO2: 95% 95% 96% 96%   Weight:       Height:             PE: AOx3   CNII-XII intact   PERRL, EOMI   Motor   L deltoid 5/5; R deltoid 5/5  L biceps 5/5; R biceps 5/5  L triceps 5/5; R triceps 5/5  L intrinsics 5/5; R intrinsics 5/5      L iliopsoas 5/5 , R iliopsoas 5/5  L quadriceps 5/5; R quadriceps 5/5  L Dorsiflexion 5/5; R dorsiflexion 5/5  L Plantarflexion 5/5; R plantarflexion 5/5    Sensation intact    Incision cranial site open to air, dry and clean      Lab Results   Component Value Date    WBC 16.4 (H) 04/01/2023    HGB 8.6 (L) 04/01/2023    HCT 26.4 (L) 04/01/2023    PLT See Reflexed IPF Result 04/01/2023    CHOL 145 04/23/2016    TRIG 94 04/23/2016    HDL 40 (L) 04/23/2016    ALT 19 03/31/2023    AST 20 03/31/2023     04/01/2023    K 3.8 04/01/2023     (H) 04/01/2023    CREATININE 0.72 04/01/2023    BUN 29 (H) 04/01/2023    CO2 22 04/01/2023    INR 1.0 03/16/2023    LABA1C 5.7 02/01/2016    LABMICR 18 (H) 05/02/2016         A/P  51 y.o. male who presents with eft parietal occipital AVM  POD 1 s/p craniotomy for AVM resection left  POD 1 s/p partial [place] embolized left parietal occipital AVM    - SBP <130 for another day  - diet as tolerated  - activity as tolerated, okay PT and OT  - okay to discontinue decadron  - keppra  - f/u MRI brain in AM    Please contact neurosurgery with any changes in patients neurologic status.        Stephen Turner, CNP  4/1/23  8:55 AM
Neurosurgery MARLENI/Resident    Daily Progress Note   No chief complaint on file. 4/2/2023  8:19 AM    Chart reviewed. No acute events overnight. No new complaints. Vitals:    04/02/23 0400 04/02/23 0500 04/02/23 0600 04/02/23 0629   BP: 107/66 102/64 100/63    Pulse: 59 71 57 57   Resp: 19 19 17 16   Temp: 99.5 °F (37.5 °C) 99.5 °F (37.5 °C) 99.5 °F (37.5 °C) 99.5 °F (37.5 °C)   TempSrc:       SpO2: 94% 94% 94% 93%   Weight:       Height:           PE:   AOx3   CNII-XII intact   PERRL, EOMI   Motor   L deltoid 5/5; R deltoid 5/5  L biceps 5/5; R biceps 5/5  L triceps 5/5; R triceps 5/5  L intrinsics 5/5; R intrinsics 5/5      L iliopsoas 5/5 , R iliopsoas 5/5  L quadriceps 5/5; R quadriceps 5/5  L Dorsiflexion 5/5; R dorsiflexion 5/5  L Plantarflexion 5/5; R plantarflexion 5/5     Sensation intact     Incision cranial site open to air, dry and clean      Lab Results   Component Value Date    WBC 16.9 (H) 04/02/2023    HGB 8.1 (L) 04/02/2023    HCT 25.2 (L) 04/02/2023    PLT See Reflexed IPF Result 04/02/2023    CHOL 145 04/23/2016    TRIG 94 04/23/2016    HDL 40 (L) 04/23/2016    ALT 19 03/31/2023    AST 20 03/31/2023     04/02/2023    K 3.7 04/02/2023     (H) 04/02/2023    CREATININE 0.66 (L) 04/02/2023    BUN 23 (H) 04/02/2023    CO2 22 04/02/2023    INR 1.0 03/16/2023    LABA1C 5.7 02/01/2016    LABMICR 18 (H) 05/02/2016         A/P  51 y.o. male who presents with eft parietal occipital AVM  POD 2 s/p craniotomy for AVM resection left  POD 2 s/p partial [place] embolized left parietal occipital AVM     - f/u CT head  - continue SUMI drain, monitor output  - activity as tolerated, okay PT and OT  - keppra    Please contact neurosurgery with any changes in patients neurologic status.        Sanjay Hutchison, CNP  4/2/23  8:19 AM
Neurosurgery MARLENI/Resident    Daily Progress Note   No chief complaint on file. 4/3/2023  7:33 AM    Chart reviewed. No acute events overnight. No new complaints. Vitals:    04/03/23 0400 04/03/23 0500 04/03/23 0600 04/03/23 0700   BP: (!) 95/58 96/61 95/64 103/67   Pulse: 52 51 56 61   Resp: 17 15 16 13   Temp: 97.8 °F (36.6 °C) 98.4 °F (36.9 °C) 99.3 °F (37.4 °C)    TempSrc: Oral Oral Oral    SpO2: 93% 92% 93% 97%   Weight:   146 lb 4.8 oz (66.4 kg)    Height:           PE:   AOx3   CNII-XII intact   PERRL, EOMI   Motor   L deltoid 5/5; R deltoid 5/5  L biceps 5/5; R biceps 5/5  L triceps 5/5; R triceps 5/5  L intrinsics 5/5; R intrinsics 5/5      L iliopsoas 5/5 , R iliopsoas 5/5  L quadriceps 5/5; R quadriceps 5/5  L Dorsiflexion 5/5; R dorsiflexion 5/5  L Plantarflexion 5/5; R plantarflexion 5/5     Sensation intact     Incision cranial site open to air, dry and clean      Lab Results   Component Value Date    WBC 16.6 (H) 04/03/2023    HGB 8.7 (L) 04/03/2023    HCT 26.3 (L) 04/03/2023     04/03/2023    CHOL 145 04/23/2016    TRIG 94 04/23/2016    HDL 40 (L) 04/23/2016    ALT 19 03/31/2023    AST 20 03/31/2023     04/03/2023    K 3.9 04/03/2023     04/03/2023    CREATININE 0.63 (L) 04/03/2023    BUN 17 04/03/2023    CO2 22 04/03/2023    INR 1.0 03/16/2023    LABA1C 5.7 02/01/2016    LABMICR 18 (H) 05/02/2016     A/P  51 y.o. male who presents with eft parietal occipital AVM  POD 3 s/p craniotomy for AVM resection left  POD 3 s/p partial Joe embolized left parietal occipital AVM     - f/u DSA today  - activity as tolerated, okay PT and OT  - keppra      Please contact neurosurgery with any changes in patients neurologic status.        Migue Milton CNP  4/3/23  7:33 AM
Neurosurgery MARLENI/Resident    Daily Progress Note   No chief complaint on file. 4/4/2023  9:03 AM    Chart reviewed. No acute events overnight. No new complaints. Vitals:    04/04/23 0635 04/04/23 0700 04/04/23 0730 04/04/23 0800   BP:  95/62 98/63 (!) 99/48   Pulse:  55 56 92   Resp:  12 12 17   Temp:    98.6 °F (37 °C)   TempSrc:    Oral   SpO2:    94%   Weight: 144 lb 4.8 oz (65.5 kg)      Height:           PE:   AOx3   Mild aphasia  CNII-XII intact   PERRL, EOMI   Motor   L deltoid 5/5; R deltoid 5/5  L biceps 5/5; R biceps 5/5  L triceps 5/5; R triceps 5/5  L intrinsics 5/5; R intrinsics 5/5      L iliopsoas 5/5 , R iliopsoas 5/5  L quadriceps 5/5; R quadriceps 5/5  L Dorsiflexion 5/5; R dorsiflexion 5/5  L Plantarflexion 5/5; R plantarflexion 5/5     Sensation intact     Incision cranial site open to air, dry and clean      Lab Results   Component Value Date    WBC 15.5 (H) 04/04/2023    HGB 8.6 (L) 04/04/2023    HCT 26.6 (L) 04/04/2023     04/04/2023    CHOL 145 04/23/2016    TRIG 94 04/23/2016    HDL 40 (L) 04/23/2016    ALT 19 03/31/2023    AST 20 03/31/2023     (L) 04/04/2023    K 4.1 04/04/2023     04/04/2023    CREATININE 0.60 (L) 04/04/2023    BUN 22 (H) 04/04/2023    CO2 19 (L) 04/04/2023    INR 1.0 03/16/2023    LABA1C 5.7 02/01/2016    LABMICR 18 (H) 05/02/2016       Radiology   CT HEAD WO CONTRAST    Result Date: 4/3/2023  EXAMINATION: CT OF THE HEAD WITHOUT CONTRAST  4/3/2023 2:49 pm TECHNIQUE: CT of the head was performed without the administration of intravenous contrast. Automated exposure control, iterative reconstruction, and/or weight based adjustment of the mA/kV was utilized to reduce the radiation dose to as low as reasonably achievable. COMPARISON: 04/02/2023, 03/31/2023, 09/29/2022, MRI on 12/22/2022.  HISTORY: ORDERING SYSTEM PROVIDED HISTORY: follow cerebral edema TECHNOLOGIST PROVIDED HISTORY: Follow cerebral edema Follow-up FINDINGS: BRAIN/VENTRICLES: There
Neurosurgery Post op Progress Note      POD# 0  s/p craniotomy for AVM resection left  POD #0  s/p partial Joe embolized left parietal occipital AVM    SUBJECTIVE:      Patient presented for elective embolization of left parietal occipital AVM followed by elective surgical resection. OBJECTIVE     VITALS:  BP 99/64   Pulse 55   Temp 98.4 °F (36.9 °C) (Bladder)   Resp 18   Ht 5' 5\" (1.651 m) Comment: chart  Wt 158 lb (71.7 kg) Comment: chart  SpO2 97%   BMI 26.29 kg/m² , Temp (24hrs), Av.4 °F (36.3 °C), Min:96.8 °F (36 °C), Max:98.4 °F (36.9 °C)    GENERAL:  awake,  no acute distress  CARDIOVASCULAR:  regular rate and rhythm   LUNGS:  CTA Bilaterally  ABDOMEN:  Abdomen soft, non-tender, non-distended  INCISION: Incision clean/dry/intact      Neurology:  Patient is extubated patient got successfully extubated  Open eyes to voice   Pupil reactive bilaterally  Follows commands  Family at the bedside  Moving bilateral lower extremity more as compared to bilateral upper extremity  Family at the bedside states that patient is having headache.     Wound   Post op wound:    Dressing is clean/dry/intact with no signs of drainage     ASSESSMENT AND PLAN    51 y.o. male status post craniotomy for AVM resection left, partial Joe embolization of left parieto-occipital AVM post op day # 0    - SBP <130 for 2 days   -Keep patient well-hydrated, normal saline at 100 mm/h  -Analgesia: Fentanyl 25mcg or 50mcg q2hrs PRN   - Periop Antibiotics: Ancef 1g q8hrs for 3 doses   - Activity: A  Bedrest   - DVT prophylaxis: SCDs  - Diet: NPO ,   -Ancef 2 gm every 8 x 3 doses  - GI prophylaxis: Protonix 40mg IV Daily  - Seizure prophylaxis: Keppra 500mg q12hrs IV     Electronically signed by Spenser Ricks MD on 3/31/2023 at 7:17 PM
Neurosurgery Resident  Daily Progress Note    4/7/2023  12:29 PM    Chart reviewed. No acute events overnight. No new complaints. Vitals reviewed, afebrile. Vitals:    04/07/23 0900 04/07/23 1000 04/07/23 1100 04/07/23 1200   BP: 99/69 (!) 95/59 107/66 112/67   Pulse: 63 62 62 64   Resp: 19 21 19 16   Temp: 98.1 °F (36.7 °C) 98.2 °F (36.8 °C)  98.1 °F (36.7 °C)   TempSrc: Oral Oral  Oral   SpO2: 96% 96% 96% 96%   Weight:       Height:           PE:   Gen: AOx3, NAD  Cardiovascular: Regular rate, no dependent edema, distal pulses 2+  Respiratory: Chest symmetric, no accessory muscle use, normal respirations   Neuro:   5/5 BUE  5/5 BLE  Sensation intact BUE/BLE  Incision CDI      Lab Results   Component Value Date    WBC 15.7 (H) 04/06/2023    HGB 8.9 (L) 04/06/2023    HCT 26.2 (L) 04/06/2023     04/06/2023    CHOL 145 04/23/2016    TRIG 94 04/23/2016    HDL 40 (L) 04/23/2016    ALT 19 03/31/2023    AST 20 03/31/2023     04/06/2023    K 3.7 04/06/2023     04/06/2023    CREATININE 0.58 (L) 04/06/2023    BUN 18 04/06/2023    CO2 20 04/06/2023    INR 1.0 03/16/2023    LABA1C 5.7 02/01/2016    LABMICR 18 (H) 05/02/2016     EXAMINATION:   CT OF THE HEAD WITHOUT CONTRAST  4/7/2023 4:49 am       TECHNIQUE:   CT of the head was performed without the administration of intravenous   contrast. Automated exposure control, iterative reconstruction, and/or weight   based adjustment of the mA/kV was utilized to reduce the radiation dose to as   low as reasonably achievable. COMPARISON:   04/03/2023       HISTORY:   ORDERING SYSTEM PROVIDED HISTORY: folllow up   TECHNOLOGIST PROVIDED HISTORY:       folllow up       FINDINGS:   BRAIN/VENTRICLES: Post therapeutic changes redemonstrated of the left   posterior cerebral hemisphere AVM. Increased density redemonstrated of the   draining veins. Right to left midline shift measuring approximately 3 mm.    Mass effect upon the left lateral ventricle appears
Nutrition Assessment     Type and Reason for Visit: Initial (LOS)    Nutrition Recommendations/Plan:   Continue current diet. Monitor labs, weights, and plan of care. Malnutrition Assessment:  Malnutrition Status: No malnutrition    Nutrition Assessment:  Chart reviewed/pt seen for length of stay. Admitted for resection of left AVM. PMH: DM.  S/p left PCA embolization and AVM resection on 3/30 and s/p DSA on 4/3. Pt tolerating PO well with intakes of % of meals. Pt with some c/o nausea at times. Weight fluctuations noted. Labs reviewed: Na 134 mmol/L, BUN 22 mg/dL, Glucose 144-151 mg/dL. Meds include: Decadron, Senokot. Estimated Daily Nutrient Needs:  Energy (kcal):  8233-8810 kcals/day Weight Used for Energy Requirements: Current     Protein (g):  100 gm pro/day Weight Used for Protein Requirements: Admission        Fluid (ml/day):  35 mL/kg = 9791-4254 mL/day or per MD Method Used for Fluid Requirements: ml/Kg    Nutrition Related Findings:   Labs/Meds reviewed. Last BM 4/4. Wound Type: None    Current Nutrition Therapies:    ADULT DIET;  Regular    Anthropometric Measures:  Height: 5' 5\" (165.1 cm)  Current Body Wt: 144 lb 4.8 oz (65.5 kg)   BMI: 24    Nutrition Diagnosis:   No nutrition diagnosis at this time     Nutrition Interventions:   Food and/or Nutrient Delivery: Continue Current Diet  Nutrition Education/Counseling: No recommendation at this time  Coordination of Nutrition Care: Continue to monitor while inpatient  Plan of Care discussed with: Patient; RN    Nutrition Monitoring and Evaluation:   Behavioral-Environmental Outcomes: None Identified  Food/Nutrient Intake Outcomes: Food and Nutrient Intake  Physical Signs/Symptoms Outcomes: Biochemical Data, GI Status, Weight, Skin    Discharge Planning:    No discharge needs at this time     Liliam Kearney, [address] [place] [address],   Contact: [phone]
Occupational Therapy  Facility/Department: [address] NEURO ICU  Occupational Therapy Initial Assessment    Name: Marty Hoskins  : 1975  MRN: [id number]  Date of Service: 2023    Discharge Recommendations:  Patient would benefit from continued therapy after discharge  OT Equipment Recommendations  Equipment Needed: No       Patient Diagnosis(es): Diagnoses of Aneurysm ([address] AVM (arteriovenous malformation) brain, and AVM (arteriovenous malformation) were pertinent to this visit. Past Medical History:  has a past medical history of AVM (arteriovenous malformation), Diabetes ([address] History of migraine, Kidney stone, and Under care of service provider. Past Surgical History:  has a past surgical history that includes Cerebral angiogram (2022); Kidney stone removal (); other surgical history (Bilateral, 2023); and craniotomy (Left, 2023). Assessment   Performance deficits / Impairments: Decreased functional mobility ; Decreased ADL status; Decreased cognition;Decreased safe awareness  Assessment: Pt presents with decreased cogniton and safety awareness impacting IND and safety in ADL tasks. Pt would benefit from continued therapy to increase IND and safety prior to discharge from acute care setting.   Prognosis: Good  Decision Making: Medium Complexity  REQUIRES OT FOLLOW-UP: Yes  Activity Tolerance  Activity Tolerance: Patient Tolerated treatment well        Plan   Occupational Therapy Plan  Times Per Week: 3x/week  Current Treatment Recommendations: Functional mobility training, Safety education & training, Self-Care / ADL, Patient/Caregiver education & training, Equipment evaluation, education, & procurement, Cognitive/Perceptual training, Cognitive reorientation     Restrictions  Restrictions/Precautions  Restrictions/Precautions:  (ambulate patient)  Required Braces or Orthoses?: No  Position Activity Restriction  Other position/activity restrictions: s/p craniotomy for AVM
Order obtained for extubation. SpO2 of 97% on 30% FiO2. Patient extubated and placed on 4 liters/min via nasal cannula. Post extubation SpO2 is 96% with HR 61 bpm and RR 17 breaths/min. Patient had mild cough that was productive of clear  sputum. Extubation Well tolerated by patient. .   Breath Sounds: Clear    STEFANIE PAEZ RCP   5:40 PM
PT transported to CT by surgery RN, and Anesthesia.  PT tolerated well, no complications
PT transported to room 129 by surgery RN, and RT. Pt tolerated well, and No complication. Report given to 1B nurse Becka Boateng RN.  All questions answered
Physical Therapy        Physical Therapy Cancel Note    DATE: 4/3/2023    NAME: Rhoda Silvestre  MRN: [id number]   : 1975      Patient not seen this date for Physical Therapy due to:     Other: pt off unit at diagnostic angio this AM. PT will check back as time allows this PM.       Electronically signed by Jamarcus Roberts PT on 4/3/2023 at 11:36 AM
Physician Progress Note      Rolando Lopez  CSN #:                  [id number]  :                       1975  ADMIT DATE:       3/28/2023 4:37 PM  DISCH DATE:  RESPONDING  PROVIDER #:        YUNI JOHNSON          QUERY TEXT:    Pt admitted with Arteriovenous malformation. Pt noted to have HGB   15.>12.6>9.9. If possible, please document in the progress notes and discharge   summary if you are evaluating and/or treating any of the following: The medical record reflects the following:  Risk Factors: AVM requiring multiple procedures this admission  Clinical Indicators: HGB 15.1>12.6>9.9. 3/30 Brief Op Note AVM Embolization   Endovascular EBL <100cc. 3/30 Brief Op Note Resection AVM /Craniotomy EBL   1300cc. 3/31 CT Head Post treatment change to the left parietal AVM with a   trace amount of  subdural hemorrhagic products and pneumocephalus. Treatment: Embolization/Embolization AVM, Lab, ICU Monitoring, MRA/MRI Brain,   CT Head x2, Ventilator support, Neurosurgery consult. IV NS 75cc/hr    Any questions please contact:  Gisel Aguero RN,BSN,[email or web address]  [phone]-Between the hours of 6:30am-3pm.  Options provided:  -- Acute blood loss anemia  -- Chronic blood loss anemia  -- Acute on chronic blood loss anemia  -- Postoperative acute blood loss anemia  -- Dilutional anemia  -- Other - I will add my own diagnosis  -- Disagree - Not applicable / Not valid  -- Disagree - Clinically unable to determine / Unknown  -- Refer to Clinical Documentation Reviewer    PROVIDER RESPONSE TEXT:    This patient has acute blood loss anemia. Query created by:  Yves Jo on 3/31/2023 12:42 PM      Electronically signed by:  Ruchi Bunn 3/31/2023 12:55 PM
Pt admitted to 1B . [facility]. Pt orientated to room, call light within reach.     Amy STILES
Pt complaining of pain at his Isbell catheter. RN notified [facility] NP. Plan to remove isbell, have pt void per urinal/toilet. Reinsert isbell in OR tomorrow.     Amy STILES
Pt taken to MRI with RN and portable monitor at 1530. Pt and RN back from MRI at 1720. Pt tolerated well.     Amy STILES
Pt was taken down to Pre-op at 6:20, pt vitals stable and pt AxO4. Report given to [facility] at bedside, all questions answered. PT was put into designated room with call light in reach.
RN went over discharge instructions with patient and spouse using . PIVs taken out. All questions/concerns answered at this time.
Speech Language Pathology  [facility]    Speech Language Treatment Note    Date: 2023  Patients Name: Barrett Lombard  MRN: [id number]  Patient Active Problem List   Diagnosis Code    Left flank pain, chronic R10.9, G89.29    Type 2 diabetes mellitus without complication (HCC) H95.9    AVM (arteriovenous malformation) brain Q28.2    Migraine with aura and without status migrainosus, not intractable G43.109    AVM (arteriovenous malformation) Q27.30    Aneurysm (HCC) I72.9    S/P brain surgery Z98.890    Aphasia R47.01       Pain: 0/10    Speech and Language Treatment  Treatment time: 6485-5232    Subjective: [x] Alert [x] Cooperative     [] Confused     [] Agitated    [] Lethargic      Objective/Assessment:  Auditory Comprehension: 1 unit commands: 6/10 increased to 10/10 with max verbal/visual cues and repetition     Verbal Expression:  -Stating biographical information: Pt. Able to state first and last name and month independently. Pt. Able to state age with choice of 2. Unable to state year with choice of 2.      -Sentence Completion: 0/3 increased to 2/3 with min-max verbal cues     -Answering WHAT questions: 1/5 increased to 5/5 with choice of 2 and phonemic cues     -Object namin/5 increased to 5/5 with choice of 2     Plan:  [x] Continue ST services    [] Discharge from ST:      Discharge recommendations: []  Further therapy recommended at discharge. The patient should be able to tolerate at least 3 hours of therapy per day over 5 days or 15 hours over 7 days. [x] Further therapy recommended at discharge. [] No therapy recommended at discharge. Treatment completed by: Eduarda Abreu, SLP, M.A.  MEGAN-SLP
Speech Language Pathology  [facility]    Speech Language Treatment Note    Date: 2023  Patients Name: Steve Klein  MRN: [id number]  Diagnosis:   Patient Active Problem List   Diagnosis Code    Left flank pain, chronic R10.9, G89.29    Type 2 diabetes mellitus without complication (HCC) P89.3    AVM (arteriovenous malformation) brain Q28.2    Migraine with aura and without status migrainosus, not intractable G43.109    AVM (arteriovenous malformation) Q27.30    Aneurysm (HCC) I72.9    S/P brain surgery Z98.890    Aphasia R47.01       Pain: Pt reported 6/10 pain, RN alerted. Speech and Language Treatment  Treatment time: [id number]    Subjective: [x] Alert [x] Cooperative     [] Confused     [] Agitated    [] Lethargic  Pt engaged in tx via . Pt is demonstrating significant improvements compared to prior date. Objective/Assessment:  Auditory Comprehension: 2 unit commands: 2/6 indep, 2/6 min cues, 2/6 mod cues    Responding to Wh questions: 3/6 indep, 3/6 mod cues for word finding, no deficits noted in receptive language skills    Verbal Expression:   Biographical information: First and last name, age,   Oriented x4, not oriented to time  Sentence completion: 5/5 indep   Responsive namin/5 indep, 1/5 min cues, 2/5 mod cues      Word finding difficulty noted throughout session. ST provided training on strategies to pt and pt's wife. Both verbalized and demonstrated understanding. Plan:  [x] Continue ST services    [] Discharge from ST:      Discharge recommendations: []  Further therapy recommended at discharge. The patient should be able to tolerate at least 3 hours of therapy per day over 5 days or 15 hours over 7 days. [x] Further therapy recommended at discharge. [] No therapy recommended at discharge.     Reid Beach M.S., CCC-SLP    Treatment completed by: Reid Beach, SLP
ZOEY running with MRI/CT compatible electrodes
patient will be okay for discharge tomorrow  Decadron wean in place-patient will be discharged with Decadron  Lovenox and SCDs for DVT prophylaxis  Continue Keppra 500 mg twice daily on discharge  Will obtain CT head in 1 week in the office     Please contact neurosurgery with any changes in patients neurologic status.        Caryn Bobby CNP  4/6/23  6:09 AM
s/p partial [place] embolized left parietal occipital AVM      - Neuro checks per floor protocol  - ASA 81 mg   - PT and OT , activity as tolerated   - weaning decadron off  - continue keppra       Please contact neurosurgery with any changes in patients neurologic status.        Monica Randolph CNP  4/5/23  8:33 AM
showing post resection with trace subdural hemorrhagic products and pneumocephalus. Patient had not received any paralytic agents for about 12 hours. He arrived intubated and on Propofol 50mcg/kg/min. PERRL. Left crani site approximated and intact without any active drainage. SUMI in place with bloody output. Goal SBP<130, patient normotensive. Discussed with Dr. Ethan Cook, plan to slowly wean sedation today and allow patient to wake up. CURRENT MEDICATIONS:  SCHEDULED MEDICATIONS:   [MAR Hold] pantoprazole  40 mg Oral QAM AC    [MAR Hold] insulin lispro  0-4 Units SubCUTAneous Q6H    [MAR Hold] sodium chloride flush  5-40 mL IntraVENous 2 times per day    [MAR Hold] sodium chloride flush  5-40 mL IntraVENous 2 times per day    [MAR Hold] levETIRAcetam  500 mg Oral BID    [MAR Hold] dexamethasone  4 mg Oral 3 times per day     CONTINUOUS INFUSIONS:   [MAR Hold] dextrose      [MAR Hold] IV infusion builder 0 mL/hr at 03/29/23 2254    [MAR Hold] sodium chloride      [MAR Hold] sodium chloride 75 mL/hr at 03/29/23 0939    [MAR Hold] sodium chloride       PRN MEDICATIONS:   [MAR Hold] hydrALAZINE, [MAR Hold] labetalol, [MAR Hold] glucose, [MAR Hold] dextrose bolus **OR** [MAR Hold] dextrose bolus, [MAR Hold] glucagon (rDNA), [MAR Hold] dextrose, [MAR Hold] sodium chloride flush, [MAR Hold] fentanNYL, [MAR Hold] sodium chloride flush, [MAR Hold] sodium chloride, [MAR Hold] acetaminophen **OR** [MAR Hold] acetaminophen, [MAR Hold] polyethylene glycol, [MAR Hold] ondansetron **OR** [MAR Hold] ondansetron, [MAR Hold] sodium chloride flush, [MAR Hold] sodium chloride, [MAR Hold] oxyCODONE    VITALS:  Temperature Range: Temp: 98.6 °F (37 °C) No data recorded  BP Range: No data recorded. No data recorded. Pulse Range: No data recorded  Respiration Range: No data recorded  Current Pulse Ox: SpO2: 95 %  24HR Pulse Ox Range: No data recorded  No data found.   Estimated body mass index is 26.29 kg/m² as calculated from
%  Patient Vitals for the past 12 hrs:   BP Temp Pulse Resp SpO2   04/01/23 1000 108/67 99.7 °F (37.6 °C) 67 19 98 %   04/01/23 0900 101/64 -- 64 18 97 %   04/01/23 0800 113/68 99.7 °F (37.6 °C) 68 19 96 %   04/01/23 0700 110/64 99.7 °F (37.6 °C) 64 15 97 %   04/01/23 0635 -- 99.9 °F (37.7 °C) 61 17 96 %   04/01/23 0604 113/64 100 °F (37.8 °C) 68 20 96 %   04/01/23 0600 113/64 100 °F (37.8 °C) 62 17 95 %   04/01/23 0500 108/65 100 °F (37.8 °C) 65 20 95 %   04/01/23 0400 114/67 100 °F (37.8 °C) 63 18 94 %   04/01/23 0300 107/68 100 °F (37.8 °C) 69 18 95 %   04/01/23 0200 108/66 99.9 °F (37.7 °C) 65 18 96 %   04/01/23 0100 108/65 99.7 °F (37.6 °C) 65 19 95 %     Estimated body mass index is 26.29 kg/m² as calculated from the following:    Height as of this encounter: 5' 5\" (1.651 m).     Weight as of this encounter: 158 lb (71.7 kg).  []<16 Severe malnutrition  []16-16.99 Moderate malnutrition  []17-18.49 Mild malnutrition  []18.5-24.9 Normal  [x]25-29.9 Overweight (not obese)  []30-34.9 Obese class 1 (Low Risk)  []35-39.9 Obese class 2 (Moderate Risk)  []?40 Obese class 3 (High Risk)    RECENT LABS:   Lab Results   Component Value Date    WBC 16.4 (H) 04/01/2023    HGB 8.6 (L) 04/01/2023    HCT 26.4 (L) 04/01/2023    PLT See Reflexed IPF Result 04/01/2023    CHOL 145 04/23/2016    TRIG 94 04/23/2016    HDL 40 (L) 04/23/2016    ALT 19 03/31/2023    AST 20 03/31/2023     04/01/2023    K 3.8 04/01/2023     (H) 04/01/2023    CREATININE 0.72 04/01/2023    BUN 29 (H) 04/01/2023    CO2 22 04/01/2023    INR 1.0 03/16/2023    LABA1C 5.7 02/01/2016    LABMICR 18 (H) 05/02/2016     24 HOUR INTAKE/OUTPUT:  Intake/Output Summary (Last 24 hours) at 4/1/2023 1247  Last data filed at 4/1/2023 1051  Gross per 24 hour   Intake 2290.03 ml   Output 1390 ml   Net 900.03 ml       IMAGING:   CT HEAD WO CONTRAST  Result Date: 3/31/2023  Post treatment change to the left parietal AVM with a trace amount of subdural hemorrhagic
( size 3-6 cm, eloquent area, cortical venous drainage       Cerebral angiogram 3/28/23:  Left parieto-occipital AVM nidus measuring 21.81 mm x 15.65 mm fed by inferior division of the left MCA,  left occipital artery through dural branches, left PCA, right occipital artery through dural branches and right ICA via ACOM artery through inferior division of the left MCA with multiple fistulous points  2. Venous drainage by multiple cortical veins predominantly arterialized, engorged left middle cerebral vein into     superior sagittal sinus   3. The above lesion was treated with BMX guide catheter, Cat-5 intermediate catheter, Apollo microcatheter and [place] embolization from occipital artery dural branches   4. Joe embolization through right MCA inferior division distal branches   5. About 60-70% reduction in the size of the nidus           ASSESSMENT:   50 y/o M with past medical history significant for diabetes, left parietal occipital arteriovenous malformation had it embolized on the 3/28/23 and 3/30/23 successfully. Patient subsequently went for the AVM resection on the 3/30/23, surgery ended in the early morning of 3/31/23      PLAN:   -- SBP   -- con't decadron 4mg TID   -- topamax 50mg PRN for headache    -- f/u with Dr. Quincy Neslon in the clinic in 2 weeks and Dr Brian Chambers in 3 months  -- neuroICU management  -- on keppra for seizure prophylaxis      Case discussed with Dr. Brian Chambers attending.     Lia Suazo MD    Stroke, [facility]  [facility]  Electronically signed 3/31/2023 at 8:34 PM
Assistance: Independent  Transfer Assistance: Independent  Active : Yes  Mode of Transportation: [facility]  Occupation: Full time employment  Type of Occupation: driving [employer]  Additional Comments: Wife is avalible to assist 24/7 upon discharge, social/functional via wife and   Vision/Hearing  Vision  Vision: Impaired  Vision Exceptions: Wears glasses for reading  Hearing  Hearing: Within functional limits    Cognition   Orientation  Overall Orientation Status: Impaired  Orientation Level: Oriented to person;Disoriented to place; Disoriented to time;Disoriented to situation (able to state in hospital with options)  Cognition  Overall Cognitive Status: Exceptions  Following Commands: Follows one step commands with increased time; Follows one step commands with repetition  Memory: Decreased long term memory  Safety Judgement: Decreased awareness of need for assistance  Initiation: Requires cues for some  Sequencing: Requires cues for some     Objective      Observation/Palpation  Posture: Fair        AROM RLE (degrees)  RLE AROM: WFL  AROM LLE (degrees)  LLE AROM : WFL  AROM RUE (degrees)  RUE AROM : WFL  RUE General AROM: Formally assessed by OT  AROM LUE (degrees)  LUE AROM : WFL  LUE General AROM: Formally assessed by OT  Strength RLE  Strength RLE: Exception  R Hip Flexion: 4+/5  R Knee Flexion: 4+/5  R Knee Extension: 5/5  R Ankle Dorsiflexion: 5/5  R Ankle Plantar flexion: 4+/5  Strength LLE  Strength LLE: Exception  L Hip Flexion: 4+/5  L Knee Flexion: 4+/5  L Knee Extension: 5/5  L Ankle Plantar Flexion: 4+/5  Strength RUE  Strength RUE: WFL  Comment: Formally assessed by OT  Strength LUE  Strength LUE: WFL  Comment: Formally assessed by OT        Bed mobility  Bed Mobility Comments: unable to formally assess this date, pt in bed side chair upon arrival and retired to chair upon writers exit.   Transfers  Sit to Stand: Contact guard assistance  Stand to Sit: Contact guard assistance  Comment:
Electronically signed by Shashank Woodson M.A.  CCC-SLP on 4/4/2023 at 2:54 PM
division of the left MCA,  left occipital artery through dural branches, left PCA, right occipital artery through dural branches and right ICA via ACOM artery through inferior division of the left MCA with multiple fistulous points  2. Venous drainage by multiple cortical veins predominantly arterialized, engorged left middle cerebral vein into     superior sagittal sinus   3. The above lesion was treated with BMX guide catheter, Cat-5 intermediate catheter, Apollo microcatheter and [place] embolization from occipital artery dural branches   4. [place] embolization through right MCA inferior division distal branches   5. About 60-70% reduction in the size of the nidus           Cerebral angiogram on the 3/30/23:  Residual Left tzaswlc-wheqjcn-lrvsbzqqa AVM   The above residual nidus was treated with 6 fr short sheath, Bench sasha 071, Apollo micro catheter and Joe -18 embolization x 2   About 65 % reduction in the size of the nidus   No evidence of reflux embolization       ASSESSMENT:     52 y/o M with past medical history significant for diabetes, left parietal occipital arteriovenous malformation had it embolized on the 3/28/23 and 3/30/23 successfully. Patient subsequently went for the AVM resection on the 3/30/23, surgery ended in the early morning of 3/31/23      PLAN:   -- SBP   -- doing very well    -- topamax 50mg PRN for headache    -- f/u with Dr. Mendez Meraz in the clinic in 2 weeks and Dr Kinza Garcia in 3 months  -- neuroICU management  -- on keppra for seizure prophylaxis      Case discussed with Dr. Jose [place], patient to follow up with Dr. Kinza Garcia attending.     Lelo Julian MD    Stroke, [facility]  [id number]  Electronically signed 4/1/2023 at 4:35 PM
inferior division of the left MCA,  left occipital artery through dural branches, left PCA, right occipital artery through dural branches and right ICA via ACOM artery through inferior division of the left MCA with multiple fistulous points  2. Venous drainage by multiple cortical veins predominantly arterialized, engorged left middle cerebral vein into     superior sagittal sinus   3. The above lesion was treated with BMX guide catheter, Cat-5 intermediate catheter, Apollo microcatheter and [place] embolization from occipital artery dural branches   4. [place] embolization through right MCA inferior division distal branches   5. About 60-70% reduction in the size of the nidus           Cerebral angiogram on the 3/30/23:  Residual Left uchbakp-ukjzgvi-alwhezhci AVM   The above residual nidus was treated with 6 fr short sheath, Bench sasha 071, Apollo micro catheter and [place] -18 embolization x 2   About 65 % reduction in the size of the nidus   No evidence of reflux embolization       ASSESSMENT:     50 y/o M with past medical history significant for diabetes, left parietal occipital arteriovenous malformation had it embolized on the 3/28/23 and 3/30/23 successfully. Patient subsequently went for the AVM resection on the 3/30/23, surgery ended in the early morning of 3/31/23        PLAN:   -- SBP   -- doing very well  -- CT head showed slight vasogenic edema at the left parietal occipital area  -- topamax 50mg PRN for headache    -- f/u with Dr. Evin Fernando in the clinic in 2 weeks and Dr John Perez in 3 months  -- neuroICU management  -- on keppra for seizure prophylaxis      Case discussed with Dr. Sendy Vlila, patient to follow up with Dr. John Perez attending.     Ruthie Ingram MD    Stroke, [facility]  [address]  Electronically signed 4/2/2023 at 6:55 PM
occipital artery through dural branches and right ICA via ACOM artery through inferior division of the left MCA with multiple fistulous points  2. Venous drainage by multiple cortical veins predominantly arterialized, engorged left middle cerebral vein into     superior sagittal sinus   3. The above lesion was treated with BMX guide catheter, Cat-5 intermediate catheter, Apollo microcatheter and [place] embolization from occipital artery dural branches   4. [place] embolization through right MCA inferior division distal branches   5. About 60-70% reduction in the size of the nidus           ASSESSMENT:   52 y/o M with past medical history significant for diabetes, left parietal occipital arteriovenous malformation had it embolized on the 3/28/23 successfully. Neurosurgery requested another session of AVM embolization through left PCA branch before taking patient for total resection. Patient understands the risks and benefits of the procedure. Risks and benefits discussed, risks include but are not limited to bruising, stroke, subarachnoid hemorrhage, death, retroperitoneal hematoma, pseudoaneurysm, lower extremity/renal/peripheral vascular compromise, informed consent obtained. PLAN:   --SBP   -- Endovascular embolization of AVM today through left PCA branch with Joe. -- Surgical resection of AVM after embolization  -- Procedure under GA   -- con't decadron 4mg TID   -- topamax 50mg PRN for headache    -- f/u with Dr. Deb Hopper in the clinic in 2 weeks and Dr Ijeoma Tripp in 3 months      Case discussed with Dr. Ijeoma Tripp attending.     Harry Saez MD    Stroke, [facility]  [address]  Electronically signed 3/30/2023 at 7:47 AM
progressing distance, pt denied fatigue throughout gait session  More Ambulation?: No  Stairs/Curb  Stairs?: No     Balance  Posture: Fair  Sitting - Static: Good  Sitting - Dynamic: Good;-  Standing - Static: Fair  Standing - Dynamic: Fair  Comments: standing balance assessed in RW  A/AROM Exercises: Ankle pumps x20 BLE, LAQ x20 BLE, SLR x20 BLE.           AM-PAC Score  AM-PAC Inpatient Mobility Raw Score : 17 (04/07/23 1346)  AM-PAC Inpatient T-Scale Score : 42.13 (04/07/23 1346)  Mobility Inpatient CMS 0-100% Score: 50.57 (04/07/23 1346)  Mobility Inpatient CMS G-Code Modifier : CK (04/07/23 1346)            Goals  Short Term Goals  Time Frame for Short Term Goals: 14  Short Term Goal 1: Pt to demonstrate ambulation 200ft with least restricitve AD, independently  Short Term Goal 2: Pt to demonstrate independent bed mobility and functional transfers  Short Term Goal 3: Pt to ascend and descend 10 stairs with 1 handrail, independently  Short Term Goal 4: Pt to participate in 45 min therapy session to demonstrate improved endurance  Short Term Goal 5: Pt to demonstrate improved standing balance to good minus           Therapy Time   Individual Concurrent Group Co-treatment   Time In 1300         Time Out 1330         Minutes 30         Timed Code Treatment Minutes: [address], [facility]
sagittal sinus, retrograde filling of the vein of Judy with drainage superiorly to the anterior superior sagittal sinus, and    deep venous drainage into the straight sinus. 3. ?? Venous varices were seen at multiple cortical veins   4. ? ? No evidence of flow related or intra-nidal aneurysms    5. ? ? Spetzler [place] grade 3 ( size 3-6 cm, eloquent area, cortical venous drainage       Cerebral angiogram 3/28/23:  Left parieto-occipital AVM nidus measuring 21.81 mm x 15.65 mm fed by inferior division of the left MCA,  left occipital artery through dural branches, left PCA, right occipital artery through dural branches and right ICA via ACOM artery through inferior division of the left MCA with multiple fistulous points  2. Venous drainage by multiple cortical veins predominantly arterialized, engorged left middle cerebral vein into     superior sagittal sinus   3. The above lesion was treated with BMX guide catheter, Cat-5 intermediate catheter, Apollo microcatheter and [place] embolization from occipital artery dural branches   4. [place] embolization through right MCA inferior division distal branches   5. About 60-70% reduction in the size of the nidus           ASSESSMENT:   50 y/o M with past medical history significant for diabetes, left parietal occipital arteriovenous malformation had it embolized on the 3/28/23 successfully. PLAN:   --SBP   -- awaiting for surgical resection tomorrow  -- will place a 4Fr arterial access later today for an intra-op DSA run tomorrow  -- con't decadron 4mg TID   -- topamax 50mg PRN for headache    -- f/u with Dr. Jannette Wang in the clinic in 2 weeks and Dr Kimmy Wagoner in 3 months      Case discussed with Dr. Kimmy Wagoner attending.     Kamilah Schultz MD    Stroke, [facility]  [address]  Electronically signed 3/29/2023 at 3:52 PM
No aphasia. HEAD:  normocephalic, atraumatic    EYES:  PERRLA, EOMI.   ENT:  moist mucous membranes   NECK:  supple, symmetric   LUNGS:  Equal air entry bilaterally   CARDIOVASCULAR:  normal s1 / s2, RRR, distal pulses intact   ABDOMEN:  Soft, no rigidity   NEUROLOGIC:  Mental Status:  ,awake             Cranial Nerves:    cranial nerves II-XII are grossly intact    Motor Exam:    Drift:  absent  Tone:  normal    Motor exam is symmetrical 5 out of 5 all extremities bilaterally    Sensory:    Touch:    Right Upper Extremity:  normal  Left Upper Extremity:  normal  Right Lower Extremity:  normal  Left Lower Extremity:  normal      Coordination/Dysmetria:  Heel to Shin:  Right:  normal  Left:  normal  Finger to Nose:   Right:  normal  Left:  normal         DRAINS:  [x] There are no drains for Neuro Critical Care to monitor at this time. ASSESSMENT AND PLAN:       This is a 51 y.o. male with history of diabetes, nephrolithiasis as well as AVM status post embolization on 3/28. Patient transferred to neuro critical care service for postop monitoring. Patient care will be discussed with attending, will reevaluate patient along with attending. NEUROLOGIC:  Left parietal occipital AVM s/p embolization   -Neuroendo consult  -Repeat CT head in AM   -Hold all AC   -Keppra 500 mg PO BID  -Decadron 4 mg Q6hr  - Topamax 50mg PRN for headache  - Mg 2g IV for headache  -Resection w/ Dr. Triana Notice on 3/30  -Neuro checks per protocol     CARDIOVASCULAR:  - Goal SBP   - Off Nicardipine drip   - PRN labetalol and hydralazine 10 mg   - Groin checks per protocol   - Continue telemetry  -Arterial line    PULMONARY:  - Nasal canula PRN  - Monitor resp status     RENAL/FLUID/ELECTROLYTE:  - BUN 18/ Creatinine 0.82  - Urine output 1.7 ml/kg/hr  - IVF: 0.75  - Replace electrolytes PRN  - Daily BMP    GI/NUTRITION:  NUTRITION:  ADULT DIET;  Regular  Diet NPO Exceptions are: Sips of Water with Meds  - Bowel regimen: Glycolax  - GI
drainage or dilated cortical veins  PLAN:   -- SBP   --c/w ASA 81 mg daily   -- Topamax 50mg PRN for headache  -- neuroICU management  -- on keppra for seizure prophylaxis  -- f/u with Dr. Nilda Talavera in the clinic in 2 weeks and Dr Pastor Higuera in 3 months      Case discussed with Dr. Pastor Higuera attending.      Marycarmen Mahoney MD    Stroke, [facility]  [address] [place]  Electronically signed 4/4/2023 at 9:31 AM
no evidence of early venous drainage or dilated cortical veins  MRI brain completed on 04/04 No large acute infarcts   PLAN:   -- SBP   --c/w ASA 81 mg daily   --Topamax 50mg PRN for headache  -- neuroICU management  -- on keppra for seizure prophylaxis  -- f/u with Dr. Balta Calzada in the clinic in 2 weeks and Dr Bj Aguilar in 3 months      Case discussed with Dr. Bj Aguilar attending.      Popeye Simeon MD    Stroke, [facility]  [address] [place]  Electronically signed 4/5/2023 at 8:56 AM
or dilated cortical veins  MRI brain completed on 04/04 No large acute infarcts   CT head completed this am, stable mass effect and cerebral edema. Persistent hyperdense draining veins. Since asymptomatic cerebral edema,will continue to monitor   PLAN:   -- SBP   --c/w ASA 81 mg daily   --Topamax 50mg PRN for headache  -- neuroICU management  -- on keppra for seizure prophylaxis  -- discharge planning per neurosurgery   -- f/u with Dr. Jacques Cook in the clinic in 2 weeks and Dr Maricruz Lindsay in 3 months      Case discussed with Dr. Maricruz Lindsay attending.      Lisette Loco MD    Stroke, [facility]  [address] [place]  Electronically signed 4/7/2023 at 9:47 AM
request.  PLAN:   -- SBP   -- topamax 50mg PRN for headache  -- f/u with Dr. Mendez Meraz in the clinic in 2 weeks and Dr Kinza Garcia in 3 months  -- neuroICU management  -- on keppra for seizure prophylaxis  --DSA today at 9 am       Case discussed with Dr. Kinza Garcia attending.      Lee Fairbanks MD    Stroke, [facility]  [address] [place]  Electronically signed 4/3/2023 at 8:49 AM
cerebellar tonsils are in normal position. The ventricles, sulci, and cisterns are within normal size and range. No significant spinal..  The intracranial flow voids are preserved. The globes and orbits are within normal limits. The visualized extracranial structures including paranasal sinuses and mastoid air cells are unremarkable. MRA HEAD: ANTERIOR CIRCULATION: No significant stenosis of the intracranial internal carotid, anterior cerebral, or middle cerebral arteries. Tortuous enlarged cortical branches of left MCA superior branch and left PCA P4 segments supply the nidus of arteriovenous malformation. POSTERIOR CIRCULATION: No significant stenosis of the vertebral, basilar, or posterior cerebral arteries. ANEURYSM: No intracranial aneurysm is seen. MRV of the head: No evidence of dural sinus thrombosis is noted. Brain MRI: There is no acute infarction, intracranial hemorrhage, or intracranial mass lesion. Unchanged left occipito temporoparietal arteriovenous malformation containing 3.9 cm nidus. There is unchanged mild surrounding white matter signal changes likely suggestive of gliosis of mild vasogenic edema. Brain MRA: No hemodynamically significant stenosis. Tortuous enlarged cortical branches of left MCA M2 superior branch and left PCA P4 segments supply the nidus of arteriovenous malformation. No aneurysm. MRV of the head: The visualized dural sinuses are widely patent. No dural sinus thrombosis or stenosis is noted. Large tortuous left-sided venous structure in left fronto parietal occipital region joining the superior sagittal sinus and straight sinus.  RECOMMENDATIONS: Unavailable     MRI BRAIN W WO CONTRAST    Result Date: 3/29/2023  EXAMINATION: MRI OF THE BRAIN WITHOUT AND WITH CONTRAST; MRA OF THE HEAD WITH AND WITHOUT CONTRAST; MRV OF THE HEAD WITH AND WITHOUT CONTRAST  3/29/2023 4:04 pm TECHNIQUE: Multiplanar multisequence MRI of the head/brain was performed without and with the
WITH AND WITHOUT CONTRAST  3/29/2023 4:04 pm TECHNIQUE: Multiplanar multisequence MRI of the head/brain was performed without and with the administration of intravenous contrast.; Multiplanar multisequence MRA of the head was performed with and without the administration of intravenous contrast.  Maximum intensity projection images were reviewed.; Multiplanar multisequence MRV of the head was performed with and without the administration of intravenous contrast. COMPARISON: Brain MRI of 12/22/2022. HISTORY: ORDERING SYSTEM PROVIDED HISTORY: AVM resection, please do with stealth protocol TECHNOLOGIST PROVIDED HISTORY: AVM resection, please do with stealth protocol Reason for Exam: AVM resection, please do with stealth protocol FINDINGS: MRI BRAIN: Findings consistent with relatively unchanged left occipital temporal and parietal lobe arteriovenous malformation with the nidus measuring approximately 3.9 x 3.2 by 2.3 cm. The arterial supply appears to arise from left MCA and left PCA which appear enlarged and tortuous. The venous drainage appears to be through by multiple cortical veins which are significantly tortuous and engorged into the superior sagittal sinus and straight sinus. There is mild amount of surrounding FLAIR hyperintensity involving the involve of white matter likely suggestive of mild gliosis or vasogenic edema. There is also significant amount of surrounding leptomeningeal enhancement on postcontrast images due to increased vascularity. There is no acute infarct. No mass effect or midline shift. No evidence of an acute intracranial hemorrhage. The ventricles and sulci are normal in size and configuration. The sellar/suprasellar regions appear unremarkable. The normal signal voids within the major intracranial vessels appear maintained. There is no acute infarction, intracranial hemorrhage, or intracranial mass lesion. No mass effect, midline shift, or extra-axial collection is noted.  The brain
thrombosis or stenosis is noted. Large tortuous left-sided venous structure in left fronto parietal occipital region joining the superior sagittal sinus and straight sinus. RECOMMENDATIONS: Unavailable       DSA 11/1/23:       Cerebral angiogram on the 3/30/23:  Residual Left [id number] AVM   The above residual nidus was treated with 6 fr short sheath, Bench sasha 071, Apollo micro catheter and [place] -18 embolization x 2   About 65 % reduction in the size of the nidus   No evidence of reflux embolization       Labs and Images reviewed with:  [] Dr. Alva Has. Rony    [x] Dr. Audrey Dias  [] Dr. Garry Bamberger  [] There are no new interval images to review. PHYSICAL EXAM       CONSTITUTIONAL:  Well developed, well nourished. Alert and oriented x 3, in no acute distress. GCS 15. Nontoxic. No dysarthria. Mild to moderate mixed aphasia, some limitation in assessment due to language barrier.    HEAD:  normocephalic, atraumatic    EYES:  PERRL, EOMI   ENT:  moist mucous membranes   NECK:  supple, symmetric   LUNGS:  Equal air entry bilaterally clear   CARDIOVASCULAR:  normal s1 / s2, RRR, distal pulses intact   ABDOMEN:  Soft, no rigidity, normal bowel sounds    NEUROLOGIC:  Mental Status:  A & O x3, Awake             Cranial Nerves:    cranial nerves II-XII are grossly intact    Motor Exam:    Drift:  absent  Tone:  normal    Motor exam is symmetrical 5 out of 5 all extremities bilaterally     Sensory:    Touch:    Right Upper Extremity:  normal  Left Upper Extremity:  normal  Right Lower Extremity:  normal  Left Lower Extremity:  normal      Plantar Response:  Right:  equivocal  Left:  equivocal    Clonus:  absent  Wong's:  absent    Coordination/Dysmetria:  Heel to Shin:  Right:  normal  Left:  normal  Finger to Nose:   Right:  normal  Left:  normal   Dysdiadochokinesia:  absent    Gait: Not tested   NIH Stroke Scale Total (if not done complete detailed one below):    1a.  Level of consciousness:  0
for full 5 seconds  6b. Motor right le - no drift; leg holds 30 degree position for full 5 seconds  7. Limb Ataxia:  0 - absent  8. Sensory:  0 - normal; no sensory loss  9. Best Language:  0 - no aphasia, normal  10. Dysarthria:  0 - normal  11. Extinction and Inattention:  0 - no abnormality  TOTAL:     DRAINS:  [x] SUMI drain    ASSESSMENT AND PLAN:        50 yo male with a history of DM 2 and left parietal occipital AVM who presented on 3/28/23 for elective treatment of the AVM. Patient underwent [place] embolization on 3/28 with 60-70% reduction in nidus. He was then taken to OR on 3/30. He underwent a second round of Joe embolization and then left sided craniotomy for AVM resection. Intra-op angiogram showed no evidence of nidus. Patient admitted to the Neuro ICU throughout his course for close monitoring. Remained intubated post op due to prolonged procedure.          NEUROLOGIC:  - Left parietal occipital AVM  - POD #5 s/p cerebral angiogram showing the left parieto-occipital AVM treated with Joe embolization from occipital artery dural branches and right MCA inferior division distal branches with 60-70% reduction in the size of the nidus  - POD#3 s/p left sided crani with resection of the AVM, underwent further embolization intra-op prior to resection, post resection angiogram showed no evidence of nidus  - Keppra 500mg BID empirically  -Follow-up CT head pending  - Decadron 4mg Q8h IV, if NSG is ok we will wean dose to 2 mg   - HOB 30 degrees  - Goal SBP<130  - Neuro checks per protocol    CARDIOVASCULAR:      BP Range: Systolic (09VJR), EOM:695 , Min:97 , QYM:421     Diastolic (13QLB), QHF:17, Min:59, Max:73    Pulse Range: Pulse  Av.6  Min: 55  Max: 85    - Goal SBP<130  - PRN Hydralazine/Labetalol  - Cardene infusion if needed  - Continue telemetry    PULMONARY:    Respiration Range: Resp  Av.9  Min: 13  Max: 23  Current Pulse Ox: SpO2: 93 %    -On room air?  - CXR :No

## 2023-04-07 NOTE — PLAN OF CARE
Problem: Chronic Conditions and Co-morbidities  Goal: Patient's chronic conditions and co-morbidity symptoms are monitored and maintained or improved  3/29/2023 1045 by Vu Aponte RN  Outcome: Progressing  3/29/2023 0622 by Isabel Nicole RN  Outcome: Progressing  Flowsheets  Taken 3/29/2023 0600  Care Plan - Patient's Chronic Conditions and Co-Morbidity Symptoms are Monitored and Maintained or Improved: Monitor and assess patient's chronic conditions and comorbid symptoms for stability, deterioration, or improvement  Taken 3/28/2023 2000  Care Plan - Patient's Chronic Conditions and Co-Morbidity Symptoms are Monitored and Maintained or Improved: Monitor and assess patient's chronic conditions and comorbid symptoms for stability, deterioration, or improvement     Problem: Discharge Planning  Goal: Discharge to home or other facility with appropriate resources  3/29/2023 0622 by Isabel Nicole RN  Outcome: Progressing  Flowsheets  Taken 3/29/2023 0600  Discharge to home or other facility with appropriate resources: Identify barriers to discharge with patient and caregiver  Taken 3/28/2023 2000  Discharge to home or other facility with appropriate resources: Identify barriers to discharge with patient and caregiver     Problem: Pain  Goal: Verbalizes/displays adequate comfort level or baseline comfort level  3/29/2023 1045 by Vu Aponte RN  Outcome: Progressing
Problem: Chronic Conditions and Co-morbidities  Goal: Patient's chronic conditions and co-morbidity symptoms are monitored and maintained or improved  4/2/2023 0651 by Kris Henry RN  Outcome: Progressing  4/2/2023 0031 by Kris Henry RN  Outcome: Progressing     Problem: Discharge Planning  Goal: Discharge to home or other facility with appropriate resources  4/2/2023 0651 by Kris Henry RN  Outcome: Progressing  4/2/2023 0031 by Kris Henry RN  Outcome: Progressing     Problem: Pain  Goal: Verbalizes/displays adequate comfort level or baseline comfort level  4/2/2023 0651 by Kris Henry RN  Outcome: Progressing  4/2/2023 0031 by Kris Henry RN  Outcome: Progressing     Problem: Safety - Adult  Goal: Free from fall injury  4/2/2023 0651 by Kris Henry RN  Outcome: Progressing  4/2/2023 0031 by Kris Henry RN  Outcome: Progressing     Problem: Respiratory - Adult  Goal: Achieves optimal ventilation and oxygenation  4/2/2023 0651 by Kris Henry RN  Outcome: Progressing  4/2/2023 0031 by Kris Henry RN  Outcome: Adequate for Discharge
Problem: Chronic Conditions and Co-morbidities  Goal: Patient's chronic conditions and co-morbidity symptoms are monitored and maintained or improved  4/4/2023 0813 by No Diaz RN  Outcome: Progressing  4/4/2023 0509 by Hamilton Aguero RN  Outcome: Progressing     Problem: Discharge Planning  Goal: Discharge to home or other facility with appropriate resources  4/4/2023 0813 by No Diaz RN  Outcome: Progressing  4/4/2023 0509 by Hamilton Aguero RN  Outcome: Progressing     Problem: Pain  Goal: Verbalizes/displays adequate comfort level or baseline comfort level  4/4/2023 0813 by No Diaz RN  Outcome: Progressing  Flowsheets (Taken 4/4/2023 0800)  Verbalizes/displays adequate comfort level or baseline comfort level:   Encourage patient to monitor pain and request assistance   Assess pain using appropriate pain scale  4/4/2023 0509 by Hamilton Aguero RN  Outcome: Progressing  Flowsheets (Taken 4/3/2023 1921)  Verbalizes/displays adequate comfort level or baseline comfort level:   Encourage patient to monitor pain and request assistance   Assess pain using appropriate pain scale   Administer analgesics based on type and severity of pain and evaluate response   Implement non-pharmacological measures as appropriate and evaluate response   Consider cultural and social influences on pain and pain management   Notify Licensed Independent Practitioner if interventions unsuccessful or patient reports new pain     Problem: Safety - Adult  Goal: Free from fall injury  4/4/2023 0813 by No Diaz RN  Outcome: Progressing  4/4/2023 0509 by Hamilton Aguero RN  Outcome: Progressing     Problem: Respiratory - Adult  Goal: Achieves optimal ventilation and oxygenation  4/4/2023 0813 by No Diaz RN  Outcome: Progressing  4/4/2023 0509 by Hamilton Aguero RN  Outcome: Progressing
Problem: Chronic Conditions and Co-morbidities  Goal: Patient's chronic conditions and co-morbidity symptoms are monitored and maintained or improved  4/5/2023 0533 by Loli Gan RN  Outcome: Progressing  4/5/2023 0532 by Loli Gan RN  Outcome: Progressing     Problem: Discharge Planning  Goal: Discharge to home or other facility with appropriate resources  4/5/2023 0533 by Loli Gan RN  Outcome: Progressing  4/5/2023 0532 by Loli Gan RN  Outcome: Progressing     Problem: Pain  Goal: Verbalizes/displays adequate comfort level or baseline comfort level  4/5/2023 0533 by Loli Gan RN  Outcome: Progressing  4/5/2023 0532 by Loli Gan RN  Outcome: Progressing  Flowsheets (Taken 4/4/2023 2000)  Verbalizes/displays adequate comfort level or baseline comfort level:   Encourage patient to monitor pain and request assistance   Assess pain using appropriate pain scale   Administer analgesics based on type and severity of pain and evaluate response   Implement non-pharmacological measures as appropriate and evaluate response   Consider cultural and social influences on pain and pain management   Notify Licensed Independent Practitioner if interventions unsuccessful or patient reports new pain     Problem: Safety - Adult  Goal: Free from fall injury  4/5/2023 0533 by Loli Gan RN  Outcome: Progressing  4/5/2023 0532 by Loli Gan RN  Outcome: Progressing  Flowsheets (Taken 4/4/2023 2000)  Free From Fall Injury: Instruct family/caregiver on patient safety     Problem: Respiratory - Adult  Goal: Achieves optimal ventilation and oxygenation  4/5/2023 0533 by Loli Gan RN  Outcome: Progressing  4/5/2023 0532 by Loli Gan RN  Outcome: Progressing
Problem: Chronic Conditions and Co-morbidities  Goal: Patient's chronic conditions and co-morbidity symptoms are monitored and maintained or improved  Outcome: Progressing     Problem: Discharge Planning  Goal: Discharge to home or other facility with appropriate resources  Outcome: Progressing     Problem: Pain  Goal: Verbalizes/displays adequate comfort level or baseline comfort level  4/7/2023 0542 by Chela Christensen RN  Outcome: Progressing  4/6/2023 1747 by Sania Tolbert RN  Outcome: Progressing     Problem: Safety - Adult  Goal: Free from fall injury  4/7/2023 0542 by Chela Christensen RN  Outcome: Progressing  Flowsheets (Taken 4/6/2023 2000)  Free From Fall Injury:   Instruct family/caregiver on patient safety   Based on caregiver fall risk screen, instruct family/caregiver to ask for assistance with transferring infant if caregiver noted to have fall risk factors  4/6/2023 1747 by Sania Tolbert RN  Outcome: Progressing     Problem: Respiratory - Adult  Goal: Achieves optimal ventilation and oxygenation  Outcome: Progressing     Problem: ABCDS Injury Assessment  Goal: Absence of physical injury  Outcome: Progressing
Problem: Chronic Conditions and Co-morbidities  Goal: Patient's chronic conditions and co-morbidity symptoms are monitored and maintained or improved  Outcome: Progressing     Problem: Discharge Planning  Goal: Discharge to home or other facility with appropriate resources  Outcome: Progressing     Problem: Pain  Goal: Verbalizes/displays adequate comfort level or baseline comfort level  Outcome: Progressing     Problem: Safety - Adult  Goal: Free from fall injury  Outcome: Progressing     Problem: Respiratory - Adult  Goal: Achieves optimal ventilation and oxygenation  Outcome: Adequate for Discharge
Problem: Chronic Conditions and Co-morbidities  Goal: Patient's chronic conditions and co-morbidity symptoms are monitored and maintained or improved  Outcome: Progressing     Problem: Discharge Planning  Goal: Discharge to home or other facility with appropriate resources  Outcome: Progressing     Problem: Pain  Goal: Verbalizes/displays adequate comfort level or baseline comfort level  Outcome: Progressing     Problem: Safety - Adult  Goal: Free from fall injury  Outcome: Progressing  Flowsheets (Taken 4/5/2023 2000)  Free From Fall Injury: Instruct family/caregiver on patient safety     Problem: Respiratory - Adult  Goal: Achieves optimal ventilation and oxygenation  Outcome: Progressing
Problem: Chronic Conditions and Co-morbidities  Goal: Patient's chronic conditions and co-morbidity symptoms are monitored and maintained or improved  Outcome: Progressing     Problem: Discharge Planning  Goal: Discharge to home or other facility with appropriate resources  Outcome: Progressing     Problem: Pain  Goal: Verbalizes/displays adequate comfort level or baseline comfort level  Outcome: Progressing  Flowsheets (Taken 4/3/2023 1921)  Verbalizes/displays adequate comfort level or baseline comfort level:   Encourage patient to monitor pain and request assistance   Assess pain using appropriate pain scale   Administer analgesics based on type and severity of pain and evaluate response   Implement non-pharmacological measures as appropriate and evaluate response   Consider cultural and social influences on pain and pain management   Notify Licensed Independent Practitioner if interventions unsuccessful or patient reports new pain     Problem: Safety - Adult  Goal: Free from fall injury  Outcome: Progressing     Problem: Respiratory - Adult  Goal: Achieves optimal ventilation and oxygenation  Outcome: Progressing
Problem: Chronic Conditions and Co-morbidities  Goal: Patient's chronic conditions and co-morbidity symptoms are monitored and maintained or improved  Outcome: Progressing  Flowsheets  Taken 3/29/2023 0600  Care Plan - Patient's Chronic Conditions and Co-Morbidity Symptoms are Monitored and Maintained or Improved: Monitor and assess patient's chronic conditions and comorbid symptoms for stability, deterioration, or improvement  Taken 3/28/2023 2000  Care Plan - Patient's Chronic Conditions and Co-Morbidity Symptoms are Monitored and Maintained or Improved: Monitor and assess patient's chronic conditions and comorbid symptoms for stability, deterioration, or improvement     Problem: Discharge Planning  Goal: Discharge to home or other facility with appropriate resources  Outcome: Progressing  Flowsheets  Taken 3/29/2023 0600  Discharge to home or other facility with appropriate resources: Identify barriers to discharge with patient and caregiver  Taken 3/28/2023 2000  Discharge to home or other facility with appropriate resources: Identify barriers to discharge with patient and caregiver     Problem: Pain  Goal: Verbalizes/displays adequate comfort level or baseline comfort level  Outcome: Progressing
Problem: Chronic Conditions and Co-morbidities  Goal: Patient's chronic conditions and co-morbidity symptoms are monitored and maintained or improved  Outcome: Progressing  Flowsheets  Taken 3/30/2023 0000  Care Plan - Patient's Chronic Conditions and Co-Morbidity Symptoms are Monitored and Maintained or Improved: Monitor and assess patient's chronic conditions and comorbid symptoms for stability, deterioration, or improvement  Taken 3/29/2023 2000  Care Plan - Patient's Chronic Conditions and Co-Morbidity Symptoms are Monitored and Maintained or Improved: Monitor and assess patient's chronic conditions and comorbid symptoms for stability, deterioration, or improvement     Problem: Discharge Planning  Goal: Discharge to home or other facility with appropriate resources  Outcome: Progressing  Flowsheets  Taken 3/30/2023 0000  Discharge to home or other facility with appropriate resources: Arrange for needed discharge resources and transportation as appropriate  Taken 3/29/2023 2000  Discharge to home or other facility with appropriate resources: Arrange for needed discharge resources and transportation as appropriate     Problem: Pain  Goal: Verbalizes/displays adequate comfort level or baseline comfort level  Outcome: Progressing
Problem: Pain  Goal: Verbalizes/displays adequate comfort level or baseline comfort level  4/6/2023 1747 by Darinel Kapoor, RN  Outcome: Progressing     Problem: Safety - Adult  Goal: Free from fall injury  4/6/2023 1747 by Darinel Kapoor, RN  Outcome: Progressing
Problem: Respiratory - Adult  Goal: Achieves optimal ventilation and oxygenation  Outcome: Progressing
Problem: Safety - Adult  Goal: Free from fall injury  3/31/2023 1029 by Pia Broussard RN  Outcome: Progressing  Flowsheets (Taken 3/31/2023 1029)  Free From Fall Injury:   Instruct family/caregiver on patient safety   Based on caregiver fall risk screen, instruct family/caregiver to ask for assistance with transferring infant if caregiver noted to have fall risk factors     Problem: Respiratory - Adult  Goal: Achieves optimal ventilation and oxygenation  3/31/2023 1003 by Manoj Rausch RCP  Outcome: Progressing     Problem: Chronic Conditions and Co-morbidities  Goal: Patient's chronic conditions and co-morbidity symptoms are monitored and maintained or improved  Recent Flowsheet Documentation  Taken 3/31/2023 2000 by Maximino Granados 34 - Patient's Chronic Conditions and Co-Morbidity Symptoms are Monitored and Maintained or Improved: Monitor and assess patient's chronic conditions and comorbid symptoms for stability, deterioration, or improvement  Taken 3/31/2023 0845 by Maximino Barbour 34 - Patient's Chronic Conditions and Co-Morbidity Symptoms are Monitored and Maintained or Improved: Monitor and assess patient's chronic conditions and comorbid symptoms for stability, deterioration, or improvement     Problem: Discharge Planning  Goal: Discharge to home or other facility with appropriate resources  Recent Flowsheet Documentation  Taken 3/31/2023 0845 by Pia Broussard RN  Discharge to home or other facility with appropriate resources: Identify barriers to discharge with patient and caregiver
Problem: Safety - Adult  Goal: Free from fall injury  Outcome: Progressing  Flowsheets (Taken 3/31/2023 1029)  Free From Fall Injury:   Instruct family/caregiver on patient safety   Based on caregiver fall risk screen, instruct family/caregiver to ask for assistance with transferring infant if caregiver noted to have fall risk factors     Problem: Respiratory - Adult  Goal: Achieves optimal ventilation and oxygenation  3/31/2023 1003 by Aman Chavez RCP  Outcome: Progressing     Problem: Safety - Medical Restraint  Goal: Remains free of injury from restraints (Restraint for Interference with Medical Device)  Description: INTERVENTIONS:  1. Determine that other, less restrictive measures have been tried or would not be effective before applying the restraint  2. Evaluate the patient's condition at the time of restraint application  3. Inform patient/family regarding the reason for restraint  4.  Q2H: Monitor safety, psychosocial status, comfort, nutrition and hydration  Outcome: Progressing  Flowsheets (Taken 3/31/2023 1030)  Remains free of injury from restraints (restraint for interference with medical device):   Determine that other, less restrictive measures have been tried or would not be effective before applying the restraint   Evaluate the patient's condition at the time of restraint application   Inform patient/family regarding the reason for restraint   Every 2 hours: Monitor safety, psychosocial status, comfort, nutrition and hydration
optimal ventilation and oxygenation:   Assess for changes in respiratory status   Assess for changes in mentation and behavior   Position to facilitate oxygenation and minimize respiratory effort   Oxygen supplementation based on oxygen saturation or arterial blood gases   Encourage broncho-pulmonary hygiene including cough, deep breathe, incentive spirometry   Assess the need for suctioning and aspirate as needed   Assess and instruct to report shortness of breath or any respiratory difficulty   Respiratory therapy support as indicated

## 2023-04-11 NOTE — FLOWSHEET NOTE
[address] [place]  [address]      Phone: [phone]   aspirin 81 MG chewable tablet  dexamethasone 1 MG tablet  dexamethasone 2 MG tablet  dexamethasone 4 MG tablet  levETIRAcetam 500 MG tablet  oxyCODONE-acetaminophen 5-325 MG per tablet  pantoprazole 40 MG tablet  sennosides-docusate sodium 8.6-50 MG tablet         Can you tell me what medications you are taking? [x]   Yes  []   No    Do you have any questions about your medications? []   Yes  [x]   No    All medications reviewed with patient    Do you have a follow up apt. With the neurologist?   [x]   Yes  []   No  Provided number to [facility] [phone]    Do you now your risk factors for stroke? [x]   Yes  []   No    Can you tell me the signs and symptoms of stroke? BEFAST instructions provided    Do if you know what to do if you were having signs/symptoms of stroke? [x]   Yes  []   No  Instructions to call 911 provided    Our goal is to provide the very best stroke care, on a scale of 1 to 10 with 10 as the very best who would you rank the care you received?         What can we do better?'

## 2023-04-11 NOTE — DISCHARGE SUMMARY
Department of Neurosurgery                                            Discharge Summary       PATIENT NAME: Claudia Turner  YOB: 1975  MEDICAL RECORD NO. [id number]  DATE: 4/11/2023  PRIMARY CARE PHYSICIAN: JUWAN Carlton  DISCHARGE DATE:  4/7/2023  3:35 PM  DISCHARGE DIAGNOSIS: AV malformation  Patient Active Problem List   Diagnosis Code    Left flank pain, chronic R10.9, G89.29    Type 2 diabetes mellitus without complication (HCC) R10.0    AVM (arteriovenous malformation) brain Q28.2    Migraine with aura and without status migrainosus, not intractable G43.109    AVM (arteriovenous malformation) Q27.30    Aneurysm (HCC) I72.9    S/P brain surgery Z98.890    Aphasia R47.01    Cerebral edema ([address] G93.6     DISPOSITION: Home    PROCEDURES:    PARIETAL OCCIPITAL CRANIOTOMY FOR AVM [facility] originally presented to the hospital on 3/28/2023  4:37 PM presents with AV malformation and underwent PARIETAL OCCIPITAL CRANIOTOMY FOR AVM RESECTION. He was admitted and taken to the OR for neurosurgery for procedure listed above. Patient's drain was removed when found to have low output without complications. Patient tolerated all procedures well. Labs and imaging were followed daily. At time of discharge, Claudia Turner was tolerating a General diet, having bowel movements, ambulating on his own accord and had adequate analgesia on oral pain medications, and had no signs of symptoms of complications. He is medically stable to be discharged. PHYSICAL EXAMINATION        Discharge Vitals:  height is 5' 5\" (1.651 m) and weight is 144 lb 3.2 oz (65.4 kg). His oral temperature is 98.1 °F (36.7 °C). His blood pressure is 112/67 and his pulse is 64. His respiration is 16 and oxygen saturation is 96%.      AOx3   CNII-XII intact   PERRL, EOMI   CVS: normal s2/s1  Respiratory: equal air entry bilaterally   Abdomen: soft, no rigidity  Motor and sensory intact

## 2023-04-21 ENCOUNTER — HOSPITAL ENCOUNTER (OUTPATIENT)
Dept: VASCULAR LAB | Age: 48
Discharge: HOME | End: 2023-04-21
Payer: COMMERCIAL

## 2023-04-21 DIAGNOSIS — Z98.890 S/P CRANIOTOMY: ICD-10-CM

## 2023-04-21 PROCEDURE — 93970 EXTREMITY STUDY: CPT

## 2023-04-25 ENCOUNTER — TELEPHONE (OUTPATIENT)
Dept: NEUROLOGY | Age: 48
End: 2023-04-25

## 2023-04-26 NOTE — TELEPHONE ENCOUNTER
Reviewed with hematologist as well. No need for any additional treatment. Thrombus (clot) is in a superficial vein and not likely to cause problems. If symptoms persist or worsen, may repeat the test in the future to make sure it hasn't progressed.

## 2023-04-28 ENCOUNTER — OFFICE VISIT (OUTPATIENT)
Dept: NEUROLOGY | Age: 48
End: 2023-04-28
Payer: COMMERCIAL

## 2023-04-28 VITALS
BODY MASS INDEX: 23.99 KG/M2 | SYSTOLIC BLOOD PRESSURE: 103 MMHG | HEIGHT: 65 IN | WEIGHT: 144 LBS | OXYGEN SATURATION: 98 % | HEART RATE: 76 BPM | TEMPERATURE: 97.5 F | DIASTOLIC BLOOD PRESSURE: 69 MMHG

## 2023-04-28 DIAGNOSIS — Q28.2 AVM (ARTERIOVENOUS MALFORMATION) BRAIN: Primary | ICD-10-CM

## 2023-04-28 PROCEDURE — 99215 OFFICE O/P EST HI 40 MIN: CPT | Performed by: PSYCHIATRY & NEUROLOGY

## 2023-04-28 NOTE — PROGRESS NOTES
Endovascular Neurosurgery Progress Note      Reason for evaluation: Left parieto-occipital AVM     SUBJECTIVE:     Patient came with his wife, son and daughter    No complaints. Initially had headache after discharge, but in the last 1 week, headache all resolved, patient never had seizure, keppra was given for prophylactic reason. Overall patient feels happy, and no issues noted by family    Pt c/o the surgical site scalp feeling itchy    Review of Systems:  CONSTITUTIONAL:  negative for fevers, chills, fatigue and malaise    EYES:  negative for double vision, blurred vision and photophobia     HEENT:  negative for tinnitus, epistaxis and sore throat    RESPIRATORY:  negative for cough, shortness of breath, wheezing    CARDIOVASCULAR:  negative for chest pain, palpitations, syncope, edema    GASTROINTESTINAL:  negative for nausea, vomiting    GENITOURINARY:  negative for incontinence    MUSCULOSKELETAL:  negative for neck or back pain    NEUROLOGICAL:  Negative for weakness and tingling  negative for headaches and dizziness    PSYCHIATRIC:  negative for anxiety      Review of systems otherwise negative. OBJECTIVE:     Vitals:    04/28/23 0855   BP: 103/69   Pulse: 76   Temp: 97.5 °F (36.4 °C)   SpO2: 98%        General:  Gen: normal habitus, NAD  HEENT: NCAT, mucosa moist  Cvs: RRR, S1 S2 normal  Resp: symmetric unlabored breathing  Abd: s/nd/nt  Ext: no edema  Skin: no lesions seen, warm and dry  Groins: both groins looks clean, distal pulses intact    Neuro:  Gen: awake and alert, oriented x3. Lang/speech: no aphasia or dysarthria. Follows commands but slow. CN: PERRL, EOMI, VFF, V1-3 intact, face symmetric, hearing intact, shoulder shrug symmetric, tongue midline  Motor: grossly 5/5 UE and LE b/l  Sense: LT intact in all 4 ext. Coord: FTN and HTS intact b/l  DTR: deferred  Gait: deferred    NIH Stroke Scale:   1a  Level of consciousness: 0 - alert; keenly responsive   1b.  LOC questions:  0 -

## (undated) DEVICE — BLOOD TRANSFER PACK 600 CC W/ CPLR

## (undated) DEVICE — GEL US 20GM NONIRRITATING OVERWRAPPED FILE PCH TRNSMIT

## (undated) DEVICE — SUTURE VCRL SZ 2-0 L18IN ABSRB VLT L26MM SH 1/2 CIR J775D

## (undated) DEVICE — RESERVOIR AUTOTRNS 3L W/ 150UM RAISE FLTR FOR CELL SAVR

## (undated) DEVICE — Device: Brand: SNAP ON SPHERZ

## (undated) DEVICE — TYPE: STANDARD, PERMANENT, NO.61RANGE: 139-161GF(1.36-1.58N)MATERIAL: ISO 5832-3/ASTM F136, ISO 5832-2/ASTM F1341G2BLADE: L-SHAPED W/CURVEDMAXIMUM OPENING: 8MMGRIP SURFACE: SERRATED
Type: IMPLANTABLE DEVICE | Site: BRAIN | Status: NON-FUNCTIONAL
Brand: SUGITA TITANIUM ANEURYSM CLIP II
Removed: 2023-03-30

## (undated) DEVICE — GLOVE ORANGE PI 7   MSG9070

## (undated) DEVICE — CONTAINER,SPECIMEN,4OZ,OR STRL: Brand: MEDLINE

## (undated) DEVICE — SPONGE,NEURO,1"X1",XR,STRL,LF,10/PK: Brand: MEDLINE

## (undated) DEVICE — TYPE: TEMPORARY STD, TEMPORARY, NO.55RANGE: 65-75GF(0.64-0.74N)MATERIAL: ISO 5832-3/ASTM F136, ISO 5832-2/ASTM F1341G2BLADE: SLIGHTLY CURVEDMAXIMUM OPENING: 11MMGRIP SURFACE: SERRATED
Type: IMPLANTABLE DEVICE | Site: BRAIN | Status: NON-FUNCTIONAL
Brand: SUGITA TITANIUM ANEURYSM CLIP II
Removed: 2023-03-30

## (undated) DEVICE — GOWN,AURORA,NONREINFORCED,LARGE: Brand: MEDLINE

## (undated) DEVICE — GAUZE,SPONGE,FLUFF,6"X6.75",STRL,5/TRAY: Brand: MEDLINE

## (undated) DEVICE — SUTURE VCRL SZ 3-0 L18IN ABSRB UD L26MM SH 1/2 CIR J864D

## (undated) DEVICE — TOWEL,OR,DSP,ST,NATURAL,DLX,4/PK,20PK/CS: Brand: MEDLINE

## (undated) DEVICE — TUBING, SUCTION, 9/32" X 20', STRAIGHT: Brand: MEDLINE INDUSTRIES, INC.

## (undated) DEVICE — DISPOSABLE BIPOLAR FORCEPS 7" (17.8CM) COHEN BAYONET, INSULATED, IRRIGATING, 0.5MM TIP: Brand: KIRWAN

## (undated) DEVICE — COTTON BALLS-STRUNG 1/2": Brand: COTTON BALLS

## (undated) DEVICE — SYRINGE IRRIG 60ML SFT PLIABLE BLB EZ TO GRP 1 HND USE W/

## (undated) DEVICE — SUTURE NRLN SZ 4-0 L18IN NONABSORBABLE BLK L13MM TF 1/2 CIR C584D

## (undated) DEVICE — NEPTUNE E-SEP SMOKE EVACUATION PENCIL, COATED, 70MM BLADE, PUSH BUTTON SWITCH: Brand: NEPTUNE E-SEP

## (undated) DEVICE — PROBE DOPP MIC 20MHZ L231MM TIP 0.8MM BAYONETED DISP

## (undated) DEVICE — DISPOSABLE BIPOLAR FORCEPS 7" (17.8CM) COHEN BAYONET, INSULATED, IRRIGATING, 1.5MM TIP: Brand: KIRWAN

## (undated) DEVICE — LINE AUTOTRNS ASPIR ANTICOAG CELL SAVR 5 + SYS 00208] HAEMONETICS CORP]

## (undated) DEVICE — TYPE: TEMPORARY MINI, TEMPORARY, NO.135RANGE: 65-75GF (0.64-0.74N)MATERIAL: ISO 5832-3/ASTM F136, ISO 5832-2/ASTM F1341G2BLADE: STRAIGHTMAXIMUM OPENING: 7.5MMGRIP SURFACE: SERRATED
Type: IMPLANTABLE DEVICE | Site: BRAIN | Status: NON-FUNCTIONAL
Brand: SUGITA TITANIUM ANEURYSM CLIP II
Removed: 2023-03-30

## (undated) DEVICE — 4.0MM X-COARSE DIAMOND

## (undated) DEVICE — SPONGE,NEURO,0.5"X0.5",XR,STRL,10/PK: Brand: MEDLINE

## (undated) DEVICE — Device

## (undated) DEVICE — MARKER,SKIN,WI/RULER AND LABELS: Brand: MEDLINE

## (undated) DEVICE — SVMMC CRANI PK

## (undated) DEVICE — ADHESIVE SKIN CLOSURE TOP 36 CC HI VISC DERMBND MINI

## (undated) DEVICE — AVM MICROCLIP PHYNOX CVD.4MM STERILE: Brand: AESCULAP

## (undated) DEVICE — CODMAN® SURGICAL PATTIES 1/4" X 1/4" (0.64CM X 0.64CM): Brand: CODMAN®

## (undated) DEVICE — GOWN,SURGICAL,AURORA,SLEEVE: Brand: MEDLINE

## (undated) DEVICE — 2.3MM TAPERED ROUTER

## (undated) DEVICE — GLOVE SURG SZ 7 CRM LTX FREE POLYISOPRENE POLYMER BEAD ANTI

## (undated) DEVICE — BLADE CLP TAPR HD WET DRY CAPABILITY GTT IN CHARGING USE

## (undated) DEVICE — 3.0MM PRECISION NEURO (MATCH HEAD)

## (undated) DEVICE — SUTURE MCRYL SZ 3-0 L27IN ABSRB UD L26MM SH 1/2 CIR Y416H

## (undated) DEVICE — ZYPHR DISPOSABLE CRANIAL PERFORATOR, LARGE 14/11MM: Brand: ZYPHR

## (undated) DEVICE — TYPE: TEMPORARY MINI, TEMPORARY, NO.134RANGE: 65-75GF (0.64-0.74N)MATERIAL: ISO 5832-3/ASTM F136, ISO 5832-2/ASTM F1341G2BLADE: STRAIGHTMAXIMUM OPENING: 6.5MMGRIP SURFACE: SERRATED
Type: IMPLANTABLE DEVICE | Site: BRAIN | Status: NON-FUNCTIONAL
Brand: SUGITA TITANIUM ANEURYSM CLIP II
Removed: 2023-03-30

## (undated) DEVICE — STOCKINETTE,IMPERVIOUS,12X48,STERILE: Brand: MEDLINE

## (undated) DEVICE — CONNECTOR PH 6-IN-1 Y ST: Brand: CARDINAL HEALTH

## (undated) DEVICE — AGENT HEMSTAT W2XL4IN OXIDIZED REGENERATED CELOS ABSRB

## (undated) DEVICE — SURGICAL SUCTION CONNECTING TUBE WITH MALE CONNECTOR AND SUCTION CLAMP, 2 FT. LONG (.6 M), 5 MM I.D.: Brand: CONMED

## (undated) DEVICE — DRAPE,REIN 53X77,STERILE: Brand: MEDLINE

## (undated) DEVICE — CONNECTOR,TUBING,5-IN-1,NON-STERILE: Brand: MEDLINE INDUSTRIES, INC.

## (undated) DEVICE — MITT SURG PREP L ADH DISPOSABLE

## (undated) DEVICE — RESERVOIR,SUCTION,100CC,SILICONE: Brand: MEDLINE

## (undated) DEVICE — DRAIN,WOUND,15FR,3/16,FULL-FLUTED: Brand: MEDLINE

## (undated) DEVICE — GARMENT,MEDLINE,DVT,INT,CALF,MED, GEN2: Brand: MEDLINE

## (undated) DEVICE — STRAP,POSITIONING,KNEE/BODY,FOAM,4X60": Brand: MEDLINE

## (undated) DEVICE — GLOVE SURG SZ 65 THK91MIL LTX FREE SYN POLYISOPRENE

## (undated) DEVICE — GAUZE,SPONGE,4"X4",16PLY,XRAY,STRL,LF: Brand: MEDLINE

## (undated) DEVICE — COVER LT HNDL BLU PLAS

## (undated) DEVICE — CODMAN® SURGICAL PATTIES 1/2" X1 1/2" (1.27CM X 3.81CM): Brand: CODMAN®

## (undated) DEVICE — LARGE BLUNT, DUAL PACK: Brand: LINA SKIN HOOK™

## (undated) DEVICE — PROVE COVER: Brand: UNBRANDED

## (undated) DEVICE — DRAPE MICSCP W117XL305CM DIA65MM LENS W VARI LENS2 FOR LEICA

## (undated) DEVICE — GLOVE ORANGE PI 7 1/2   MSG9075

## (undated) DEVICE — GLOVE SURG SZ 6 THK91MIL LTX FREE SYN POLYISOPRENE ANTI

## (undated) DEVICE — GOWN,SIRUS,NONRNF,SETINSLV,XL,20/CS: Brand: MEDLINE

## (undated) DEVICE — DRESSING,GAUZE,XEROFORM,CURAD,1"X8",ST: Brand: CURAD

## (undated) DEVICE — DISPOSABLE BIPOLAR FORCEPS 7" (17.8CM) COHEN BAYONET, INSULATED, IRRIGATING, 1MM TIP: Brand: KIRWAN

## (undated) DEVICE — AGENT HEMOSTATIC SURGIFLOW MATRIX KIT W/THROMBIN

## (undated) DEVICE — APPLICATOR MEDICATED 10.5 CC SOLUTION HI LT ORNG CHLORAPREP

## (undated) DEVICE — DISPOSABLE IRRIGATION CASSETTE: Brand: CORE

## (undated) DEVICE — DRESSING TRNSPAR W2XL2.75IN FLM SHT SEMIPERMEABLE WIND

## (undated) DEVICE — DISPOSABLE IRRIGATION BIPOLAR CORD, M1000 TYPE: Brand: KIRWAN

## (undated) DEVICE — PAD,NON-ADHERENT,3X8,STERILE,LF,1/PK: Brand: MEDLINE

## (undated) DEVICE — ELECTRODE PT RET AD L9FT HI MOIST COND ADH HYDRGEL CORDED

## (undated) DEVICE — BATTERY PACK 2 FOR QUIKDRIVE: Brand: UNIVERSAL NEURO 2, QUIKDRIVE